# Patient Record
Sex: MALE | Race: WHITE | NOT HISPANIC OR LATINO | URBAN - METROPOLITAN AREA
[De-identification: names, ages, dates, MRNs, and addresses within clinical notes are randomized per-mention and may not be internally consistent; named-entity substitution may affect disease eponyms.]

---

## 2019-06-05 ENCOUNTER — INPATIENT (INPATIENT)
Facility: HOSPITAL | Age: 64
LOS: 5 days | Discharge: NURSING, REHAB OR LONG-TERM CARE | End: 2019-06-11
Attending: INTERNAL MEDICINE | Admitting: INTERNAL MEDICINE
Payer: COMMERCIAL

## 2019-06-05 VITALS
HEART RATE: 78 BPM | HEIGHT: 68 IN | OXYGEN SATURATION: 100 % | DIASTOLIC BLOOD PRESSURE: 108 MMHG | RESPIRATION RATE: 18 BRPM | TEMPERATURE: 98 F | WEIGHT: 225.09 LBS | SYSTOLIC BLOOD PRESSURE: 235 MMHG

## 2019-06-05 LAB
ALBUMIN SERPL ELPH-MCNC: 4.3 G/DL — SIGNIFICANT CHANGE UP (ref 3.3–5)
ALP SERPL-CCNC: 64 U/L — SIGNIFICANT CHANGE UP (ref 40–120)
ALT FLD-CCNC: 31 U/L — SIGNIFICANT CHANGE UP (ref 12–78)
ANION GAP SERPL CALC-SCNC: 12 MMOL/L — SIGNIFICANT CHANGE UP (ref 5–17)
APPEARANCE UR: CLEAR — SIGNIFICANT CHANGE UP
APTT BLD: 31.5 SEC — SIGNIFICANT CHANGE UP (ref 27.5–36.3)
AST SERPL-CCNC: 21 U/L — SIGNIFICANT CHANGE UP (ref 15–37)
BASOPHILS # BLD AUTO: 0.04 K/UL — SIGNIFICANT CHANGE UP (ref 0–0.2)
BASOPHILS NFR BLD AUTO: 0.8 % — SIGNIFICANT CHANGE UP (ref 0–2)
BILIRUB SERPL-MCNC: 2.3 MG/DL — HIGH (ref 0.2–1.2)
BILIRUB UR-MCNC: NEGATIVE — SIGNIFICANT CHANGE UP
BLD GP AB SCN SERPL QL: SIGNIFICANT CHANGE UP
BUN SERPL-MCNC: 11 MG/DL — SIGNIFICANT CHANGE UP (ref 7–23)
CALCIUM SERPL-MCNC: 9.6 MG/DL — SIGNIFICANT CHANGE UP (ref 8.5–10.1)
CHLORIDE SERPL-SCNC: 100 MMOL/L — SIGNIFICANT CHANGE UP (ref 96–108)
CK MB CFR SERPL CALC: 2.1 NG/ML — SIGNIFICANT CHANGE UP (ref 0.5–3.6)
CO2 SERPL-SCNC: 25 MMOL/L — SIGNIFICANT CHANGE UP (ref 22–31)
COLOR SPEC: YELLOW — SIGNIFICANT CHANGE UP
CREAT SERPL-MCNC: 1.1 MG/DL — SIGNIFICANT CHANGE UP (ref 0.5–1.3)
DIFF PNL FLD: ABNORMAL
EOSINOPHIL # BLD AUTO: 0.04 K/UL — SIGNIFICANT CHANGE UP (ref 0–0.5)
EOSINOPHIL NFR BLD AUTO: 0.8 % — SIGNIFICANT CHANGE UP (ref 0–6)
EPI CELLS # UR: SIGNIFICANT CHANGE UP
GLUCOSE BLDC GLUCOMTR-MCNC: 123 MG/DL — HIGH (ref 70–99)
GLUCOSE SERPL-MCNC: 135 MG/DL — HIGH (ref 70–99)
GLUCOSE UR QL: 50 MG/DL
HCT VFR BLD CALC: 46 % — SIGNIFICANT CHANGE UP (ref 39–50)
HGB BLD-MCNC: 15.9 G/DL — SIGNIFICANT CHANGE UP (ref 13–17)
IMM GRANULOCYTES NFR BLD AUTO: 0.2 % — SIGNIFICANT CHANGE UP (ref 0–1.5)
INR BLD: 1.22 RATIO — HIGH (ref 0.88–1.16)
KETONES UR-MCNC: ABNORMAL
LEUKOCYTE ESTERASE UR-ACNC: NEGATIVE — SIGNIFICANT CHANGE UP
LYMPHOCYTES # BLD AUTO: 1.2 K/UL — SIGNIFICANT CHANGE UP (ref 1–3.3)
LYMPHOCYTES # BLD AUTO: 23.9 % — SIGNIFICANT CHANGE UP (ref 13–44)
MAGNESIUM SERPL-MCNC: 2.1 MG/DL — SIGNIFICANT CHANGE UP (ref 1.6–2.6)
MCHC RBC-ENTMCNC: 29.7 PG — SIGNIFICANT CHANGE UP (ref 27–34)
MCHC RBC-ENTMCNC: 34.6 GM/DL — SIGNIFICANT CHANGE UP (ref 32–36)
MCV RBC AUTO: 85.8 FL — SIGNIFICANT CHANGE UP (ref 80–100)
MONOCYTES # BLD AUTO: 0.3 K/UL — SIGNIFICANT CHANGE UP (ref 0–0.9)
MONOCYTES NFR BLD AUTO: 6 % — SIGNIFICANT CHANGE UP (ref 2–14)
NEUTROPHILS # BLD AUTO: 3.43 K/UL — SIGNIFICANT CHANGE UP (ref 1.8–7.4)
NEUTROPHILS NFR BLD AUTO: 68.3 % — SIGNIFICANT CHANGE UP (ref 43–77)
NITRITE UR-MCNC: NEGATIVE — SIGNIFICANT CHANGE UP
NRBC # BLD: 0 /100 WBCS — SIGNIFICANT CHANGE UP (ref 0–0)
PH UR: 5 — SIGNIFICANT CHANGE UP (ref 5–8)
PLATELET # BLD AUTO: 184 K/UL — SIGNIFICANT CHANGE UP (ref 150–400)
POTASSIUM SERPL-MCNC: 3.6 MMOL/L — SIGNIFICANT CHANGE UP (ref 3.5–5.3)
POTASSIUM SERPL-SCNC: 3.6 MMOL/L — SIGNIFICANT CHANGE UP (ref 3.5–5.3)
PROT SERPL-MCNC: 8 GM/DL — SIGNIFICANT CHANGE UP (ref 6–8.3)
PROT UR-MCNC: 30 MG/DL
PROTHROM AB SERPL-ACNC: 13.8 SEC — HIGH (ref 10–12.9)
RBC # BLD: 5.36 M/UL — SIGNIFICANT CHANGE UP (ref 4.2–5.8)
RBC # FLD: 13.1 % — SIGNIFICANT CHANGE UP (ref 10.3–14.5)
RBC CASTS # UR COMP ASSIST: SIGNIFICANT CHANGE UP /HPF (ref 0–4)
SODIUM SERPL-SCNC: 137 MMOL/L — SIGNIFICANT CHANGE UP (ref 135–145)
SP GR SPEC: 1.01 — SIGNIFICANT CHANGE UP (ref 1.01–1.02)
TROPONIN I SERPL-MCNC: <.015 NG/ML — SIGNIFICANT CHANGE UP (ref 0.01–0.04)
TSH SERPL-MCNC: 3.22 UU/ML — SIGNIFICANT CHANGE UP (ref 0.36–3.74)
UROBILINOGEN FLD QL: NEGATIVE MG/DL — SIGNIFICANT CHANGE UP
WBC # BLD: 5.02 K/UL — SIGNIFICANT CHANGE UP (ref 3.8–10.5)
WBC # FLD AUTO: 5.02 K/UL — SIGNIFICANT CHANGE UP (ref 3.8–10.5)

## 2019-06-05 PROCEDURE — G0425: CPT

## 2019-06-05 PROCEDURE — 93010 ELECTROCARDIOGRAM REPORT: CPT

## 2019-06-05 PROCEDURE — 70450 CT HEAD/BRAIN W/O DYE: CPT | Mod: 26,59

## 2019-06-05 PROCEDURE — 99291 CRITICAL CARE FIRST HOUR: CPT

## 2019-06-05 PROCEDURE — 70496 CT ANGIOGRAPHY HEAD: CPT | Mod: 26

## 2019-06-05 RX ORDER — SODIUM CHLORIDE 9 MG/ML
1000 INJECTION, SOLUTION INTRAVENOUS
Refills: 0 | Status: DISCONTINUED | OUTPATIENT
Start: 2019-06-05 | End: 2019-06-11

## 2019-06-05 RX ORDER — DEXTROSE 50 % IN WATER 50 %
25 SYRINGE (ML) INTRAVENOUS ONCE
Refills: 0 | Status: DISCONTINUED | OUTPATIENT
Start: 2019-06-05 | End: 2019-06-11

## 2019-06-05 RX ORDER — ALTEPLASE 100 MG
9 KIT INTRAVENOUS ONCE
Refills: 0 | Status: COMPLETED | OUTPATIENT
Start: 2019-06-05 | End: 2019-06-05

## 2019-06-05 RX ORDER — CHLORHEXIDINE GLUCONATE 213 G/1000ML
1 SOLUTION TOPICAL
Refills: 0 | Status: DISCONTINUED | OUTPATIENT
Start: 2019-06-05 | End: 2019-06-06

## 2019-06-05 RX ORDER — GLUCAGON INJECTION, SOLUTION 0.5 MG/.1ML
1 INJECTION, SOLUTION SUBCUTANEOUS ONCE
Refills: 0 | Status: DISCONTINUED | OUTPATIENT
Start: 2019-06-05 | End: 2019-06-11

## 2019-06-05 RX ORDER — DEXTROSE 50 % IN WATER 50 %
12.5 SYRINGE (ML) INTRAVENOUS ONCE
Refills: 0 | Status: DISCONTINUED | OUTPATIENT
Start: 2019-06-05 | End: 2019-06-11

## 2019-06-05 RX ORDER — ALTEPLASE 100 MG
81 KIT INTRAVENOUS ONCE
Refills: 0 | Status: COMPLETED | OUTPATIENT
Start: 2019-06-05 | End: 2019-06-05

## 2019-06-05 RX ORDER — DEXTROSE 50 % IN WATER 50 %
15 SYRINGE (ML) INTRAVENOUS ONCE
Refills: 0 | Status: DISCONTINUED | OUTPATIENT
Start: 2019-06-05 | End: 2019-06-11

## 2019-06-05 RX ORDER — LABETALOL HCL 100 MG
10 TABLET ORAL ONCE
Refills: 0 | Status: COMPLETED | OUTPATIENT
Start: 2019-06-05 | End: 2019-06-05

## 2019-06-05 RX ORDER — INSULIN LISPRO 100/ML
VIAL (ML) SUBCUTANEOUS EVERY 6 HOURS
Refills: 0 | Status: DISCONTINUED | OUTPATIENT
Start: 2019-06-05 | End: 2019-06-06

## 2019-06-05 RX ADMIN — ALTEPLASE 540 MILLIGRAM(S): KIT at 13:15

## 2019-06-05 RX ADMIN — ALTEPLASE 81 MILLIGRAM(S): KIT at 14:23

## 2019-06-05 RX ADMIN — ALTEPLASE 9 MILLIGRAM(S): KIT at 13:15

## 2019-06-05 RX ADMIN — ALTEPLASE 81 MILLIGRAM(S): KIT at 13:22

## 2019-06-05 RX ADMIN — Medication 10 MILLIGRAM(S): at 13:00

## 2019-06-05 NOTE — ED PROVIDER NOTE - OBJECTIVE STATEMENT
Pertinent PMH/PSH/FHx/SHx and Review of Systems contained within:  63m hx of htn, dm pw facial droop. patient notes he felt "funny" last night around 6pm but had no specific deficits. this morning he woke up and was still feeling off, but again had no deficits. at 10am approx he developed right facial droop and dysarthria. he notes no numbness, weakness, dizziness, vision loss or fall. he has no cp, sob, nausea, vomiting, fever, chills, rash, bleeding. he and wife called pmd but he was not there so they came here  Fh and Sh not otherwise contributory  ROS otherwise negative

## 2019-06-05 NOTE — H&P ADULT - NSHPPHYSICALEXAM_GEN_ALL_CORE
Gen: Alert, NAD, with slurred speech  Head: NC, AT   Eyes: PERRL, EOMI, normal lids/conjunctiva.   ENT: normal hearing, patent oropharynx without erythema/exudate, uvula midline  Neck: supple, no tenderness, Trachea midline  Pulm: Bilateral BS, normal resp effort, no wheeze/stridor/retractions  CV: RRR, no M/R/G, 2+ radial and dp pulses bl, no edema  Abd: soft, NT/ND, +BS, no hepatosplenomegaly  Mskel: extremities x4 with normal ROM and no joint effusions. no ctl spine ttp.   Skin: no rash, no bruising   Neuro: AAOx3, sensations diffusely intact to light palpation. 5/5 lue, 5/5 rue, 5/5 lle, 4/5 rle. CN 2-12 intact

## 2019-06-05 NOTE — ED PROVIDER NOTE - CLINICAL SUMMARY MEDICAL DECISION MAKING FREE TEXT BOX
pt pw right facial droop. cva diagnosed. ct head wnl. tpa given with patient and wife in agreement. will admit to icu. As interpreted by ED physician, ECG is NSR with normal intervals/axis, no changes in QRS, no ST/T changes.

## 2019-06-05 NOTE — ED ADULT TRIAGE NOTE - CHIEF COMPLAINT QUOTE
c/o r facial droop and slurred speech since 0900 sent by pmd for eval denies headache or dizziness pt a&ox3

## 2019-06-05 NOTE — H&P ADULT - ATTENDING COMMENTS
Lottie: I have seen and examined the patient face to face, have reviewed and addended the HPI, PE and a/p as necessary.     64 yo M with PMHx HTN and DM presents to ED for right facial droop and slurred speech around 10am. In ED, code stroke was called and patient received thrombolytic treatment. Patient was also hypertensive (235/108) upon arrival and received labetolol. Patient was transferred to ICU for CVA s/p tPA treatment to monitor due to increased risk of bleeding.     GEN - NAD; well appearing; A+O x3; non-toxic appearing   CARD -s1s2, RRR, no M,G,R;   PULM - CTA b/l, symmetric breath sounds;   ABD:  +BS, ND, NT, soft, no guarding, no rebound, no masses;   BACK: no CVA tenderness, Normal  spine;   EXT: symmetric pulses, 2+ dp, capillary refill < 2 seconds, no clubbing, no cyanosis, no edema   NEURO: alert, R sided facial droop sparing the upper face, EOMI, PERRLA, reflexes nl, sensation nl, coordination nl, finger to nose nl, romberg negative, motor 5/5 RUE/LUE/RLE/LLE/EHL/Plantar flexion, no pronator drift, gait nl    Repeat CT Brain in 24 hours, pending MRI/MRA  Q 1 hour neuro checks, lipid profile, Hemoglobin A1c  Carotid Dopplers, Echocardiogram  SCD's  NPO until Swallow eval   PT/OT eval  Neurology consult   BP goal 160-180s    Critical Care Time Spent 35 minutes

## 2019-06-05 NOTE — H&P ADULT - ASSESSMENT
62 yo M with PMHx HTN and DM presents to ED for facial droop and slurred speech around 10am. In ED, code stroke was called and patient received thrombolytic treatment. Patient was also hypertensive (235/108) upon arrival and received labetolol. Patient was transferred to ICU for CVA s/p tPA treatment to monitor due to increased risk of bleeding.     Neuro  -Patient still presents with facial droop and slurred speech without any other neurological deficits.  -Head CT showed involutional gliotic ischemic changes and no intracranial bleed.  -CTA was done, awaiting radiologic interpretation.   -f/u carotid artery duplex  -Cont to monitor patient for onset of new neurological deficits.   -Consult Neurology    CV  -Patient has HTN, blood pressure currently stable  -Hold patient home antihypertensive medication.  -f/u Lipid panel    Pulm  -Patient does not display signs or symptoms of respiratory distress.    GI  -Keep patient NPO  -Cont maintenance fluids of D5W      -monitor BUN/Cr for STANLEY s/p CTA.   -monitor urine output.    ENDO  -Patient has his history of DM, blood glucose currently stable.  -f/u A1c levels  -ISS    GEN  -no blood draws for 4 hours post tPA  -f/u BMP and CBC  -cont to monitor for s/s of bleeding  -cont to monitor vital signs 64 yo M with PMHx HTN and DM presents to ED for right facial droop and slurred speech around 10am. In ED, code stroke was called and patient received thrombolytic treatment. Patient was also hypertensive (235/108) upon arrival and received labetolol. Patient was transferred to ICU for CVA s/p tPA treatment to monitor due to increased risk of bleeding.     Neuro  -Patient still presents with facial droop and slurred speech without any other neurological deficits.  -Head CT showed involutional gliotic ischemic changes and no intracranial bleed.  -CTA was done, awaiting radiologic interpretation.   -f/u carotid artery duplex  -Cont to monitor patient for onset of new neurological deficits.   -Consult Neurology    CV  -Patient has HTN, blood pressure currently stable  -Hold patient home antihypertensive medication.  -f/u Lipid panel    Pulm  -Patient does not display signs or symptoms of respiratory distress.    GI  -Keep patient NPO  -Cont maintenance fluids of D5W      -monitor BUN/Cr for STANLEY s/p CTA.   -monitor urine output.    ENDO  -Patient has his history of DM, blood glucose currently stable.  -f/u A1c levels  -ISS    GEN  -no blood draws for 4 hours post tPA  -f/u BMP and CBC  -cont to monitor for s/s of bleeding  -cont to monitor vital signs 64 yo M with PMHx HTN and DM presents to ED for right facial droop and slurred speech around 10am. In ED, code stroke was called and patient received thrombolytic treatment. Patient was also hypertensive (235/108) upon arrival and received labetolol. Patient was transferred to ICU for CVA s/p tPA treatment to monitor due to increased risk of bleeding.     Neuro  -Patient still presents with facial droop and slurred speech without any other neurological deficits.  -Head CT showed involutional gliotic ischemic changes and no intracranial bleed.  -CTA was done, awaiting radiologic interpretation.   -f/u carotid artery duplex  -Cont to monitor patient for onset of new neurological deficits (every 1 hour)   -Consult Neurology (Dr. Kyle)  -check carotid doppler      CV  -Patient has HTN, blood pressure currently stable  -Hold patient home antihypertensive medication and maintain between 160-180)  -f/u Lipid panel  -check echo    Pulm  -Patient does not display signs or symptoms of respiratory distress.    GI  -Keep patient NPO  -D5 gtt vs dextrose pushes PRN  FS q 6 hours while NPO      -monitor BUN/Cr for STANLEY s/p CTA.   -monitor urine output.    ENDO  -Patient has his history of DM, blood glucose currently stable.  -f/u A1c levels  -ISS    GEN  -no blood draws for 4 hours post tPA  -f/u BMP and CBC  -cont to monitor for s/s of bleeding  -cont to monitor vital signs  -SCD for DVT px

## 2019-06-05 NOTE — H&P ADULT - HISTORY OF PRESENT ILLNESS
HPI: Patient is a 62 yo M with a pmhx of HTN, DM who presents to the ED with facial droop. Patients symptoms began last night around 6pm without neurological deficits. Patient then developed facial droop and dysarthria this morning at 10am. Patient admitted to ICU for CVA s/p tPA treatment. Denies paresthesias weakness, dizziness, vision loss, LOC, chest pain, SOB, N/V/D, fever, and urinary symptoms.     In the ED patient was code stroke, CTA Brain neg for intracranial hemorrhage and involutional and ischemic gliotic changes. Patient received tPA and labetelol. BP of 235/108 was reduced to 156/75.     PAST MEDICAL & SURGICAL HISTORY:  DM (diabetes mellitus)  HTN (hypertension)  No significant past surgical history    Allergies:  Penicillin (Rash)    Home Medications:  amLODIPine 5 mg oral tablet: 1 tab(s) orally once a day (05 Jun 2019 13:46)  Bystolic 10 mg oral tablet: 1 tab(s) orally once a day (05 Jun 2019 13:46)  Tradjenta 5 mg oral tablet: 1 tab(s) orally once a day (05 Jun 2019 13:46)  valsartan 320 mg oral tablet: 1 tab(s) orally once a day (05 Jun 2019 13:46)    MEDICATIONS  (STANDING):  chlorhexidine 4% Liquid 1 Application(s) Topical <User Schedule>  dextrose 5%. 1000 milliLiter(s) (50 mL/Hr) IV Continuous <Continuous>  dextrose 50% Injectable 12.5 Gram(s) IV Push once  dextrose 50% Injectable 25 Gram(s) IV Push once  dextrose 50% Injectable 25 Gram(s) IV Push once  insulin lispro (HumaLOG) corrective regimen sliding scale   SubCutaneous every 6 hours    MEDICATIONS  (PRN):  dextrose 40% Gel 15 Gram(s) Oral once PRN Blood Glucose LESS THAN 70 milliGRAM(s)/deciliter  glucagon  Injectable 1 milliGRAM(s) IntraMuscular once PRN Glucose LESS THAN 70 milligrams/deciliter    Vital Signs Last 24 Hrs  T(C): 36.9 (05 Jun 2019 14:52), Max: 36.9 (05 Jun 2019 14:52)  T(F): 98.4 (05 Jun 2019 14:52), Max: 98.4 (05 Jun 2019 14:52)  HR: 69 (05 Jun 2019 14:52) (69 - 82)  BP: 156/75 (05 Jun 2019 14:52) (141/67 - 235/108)  BP(mean): --  RR: 14 (05 Jun 2019 14:52) (14 - 18)  SpO2: 98% (05 Jun 2019 14:52) (98% - 100%)                          15.9   5.02  )-----------( 184      ( 05 Jun 2019 13:34 )             46.0   06-05    137  |  100  |  11  ----------------------------<  135<H>  3.6   |  25  |  1.10    Ca    9.6      05 Jun 2019 13:34  Mg     2.1     06-05    TPro  8.0  /  Alb  4.3  /  TBili  2.3<H>  /  DBili  x   /  AST  21  /  ALT  31  /  AlkPhos  64  06-05    CT BRAIN STROKE PROTOCOL                            PROCEDURE DATE:  06/05/2019          INTERPRETATION:  CLINICAL INFORMATION: CVA, right facial droop and   slurred speech/dysarthria.    COMPARISON: None.    PROCEDURE:    Axial sections of the brain were obtained from base to vertex, without   the intravenous administration of contrast material. Sagittal and coronal   reformats were then generated from the axial images.    FINDINGS:    There is no intracranial hemorrhage, mass or shift of the midline   structures. There are involutional changes. Small vessel white matter   ischemic changes are noted. There are basal ganglia calcifications.    The brain stem is unremarkable.  No cerebellopontine angle lesion is   appreciated.     The fourth, third and lateral ventricles are normal position.  No   subdural or epidural collections are present.     No fracture or destructive lesion is identified.     The mastoids are clear. There are polyps or retention cysts within the   left maxillary sinus.    IMPRESSION:    Involutional and ischemic gliotic changes.  No acute intracranial hemorrhage. HPI: Patient is a 64 yo M with a pmhx of HTN, DM who presents to the ED with right facial droop. Patients symptoms began last night around 6pm without neurological deficits. Patient then developed facial droop and dysarthria this morning at 10am. Patient admitted to ICU for CVA s/p tPA treatment. Denies paresthesias weakness, dizziness, vision loss, LOC, chest pain, SOB, N/V/D, fever, and urinary symptoms.     In the ED patient was code stroke, CTA Brain neg for intracranial hemorrhage and involutional and ischemic gliotic changes. Patient received tPA and labetelol. BP of 235/108 was reduced to 156/75.     PAST MEDICAL & SURGICAL HISTORY:  DM (diabetes mellitus)  HTN (hypertension)  No significant past surgical history    Allergies:  Penicillin (Rash)    Home Medications:  amLODIPine 5 mg oral tablet: 1 tab(s) orally once a day (05 Jun 2019 13:46)  Bystolic 10 mg oral tablet: 1 tab(s) orally once a day (05 Jun 2019 13:46)  Tradjenta 5 mg oral tablet: 1 tab(s) orally once a day (05 Jun 2019 13:46)  valsartan 320 mg oral tablet: 1 tab(s) orally once a day (05 Jun 2019 13:46)    MEDICATIONS  (STANDING):  chlorhexidine 4% Liquid 1 Application(s) Topical <User Schedule>  dextrose 5%. 1000 milliLiter(s) (50 mL/Hr) IV Continuous <Continuous>  dextrose 50% Injectable 12.5 Gram(s) IV Push once  dextrose 50% Injectable 25 Gram(s) IV Push once  dextrose 50% Injectable 25 Gram(s) IV Push once  insulin lispro (HumaLOG) corrective regimen sliding scale   SubCutaneous every 6 hours    MEDICATIONS  (PRN):  dextrose 40% Gel 15 Gram(s) Oral once PRN Blood Glucose LESS THAN 70 milliGRAM(s)/deciliter  glucagon  Injectable 1 milliGRAM(s) IntraMuscular once PRN Glucose LESS THAN 70 milligrams/deciliter    Vital Signs Last 24 Hrs  T(C): 36.9 (05 Jun 2019 14:52), Max: 36.9 (05 Jun 2019 14:52)  T(F): 98.4 (05 Jun 2019 14:52), Max: 98.4 (05 Jun 2019 14:52)  HR: 69 (05 Jun 2019 14:52) (69 - 82)  BP: 156/75 (05 Jun 2019 14:52) (141/67 - 235/108)  BP(mean): --  RR: 14 (05 Jun 2019 14:52) (14 - 18)  SpO2: 98% (05 Jun 2019 14:52) (98% - 100%)                          15.9   5.02  )-----------( 184      ( 05 Jun 2019 13:34 )             46.0   06-05    137  |  100  |  11  ----------------------------<  135<H>  3.6   |  25  |  1.10    Ca    9.6      05 Jun 2019 13:34  Mg     2.1     06-05    TPro  8.0  /  Alb  4.3  /  TBili  2.3<H>  /  DBili  x   /  AST  21  /  ALT  31  /  AlkPhos  64  06-05    CT BRAIN STROKE PROTOCOL                            PROCEDURE DATE:  06/05/2019          INTERPRETATION:  CLINICAL INFORMATION: CVA, right facial droop and   slurred speech/dysarthria.    COMPARISON: None.    PROCEDURE:    Axial sections of the brain were obtained from base to vertex, without   the intravenous administration of contrast material. Sagittal and coronal   reformats were then generated from the axial images.    FINDINGS:    There is no intracranial hemorrhage, mass or shift of the midline   structures. There are involutional changes. Small vessel white matter   ischemic changes are noted. There are basal ganglia calcifications.    The brain stem is unremarkable.  No cerebellopontine angle lesion is   appreciated.     The fourth, third and lateral ventricles are normal position.  No   subdural or epidural collections are present.     No fracture or destructive lesion is identified.     The mastoids are clear. There are polyps or retention cysts within the   left maxillary sinus.    IMPRESSION:    Involutional and ischemic gliotic changes.  No acute intracranial hemorrhage.

## 2019-06-05 NOTE — STROKE CODE NOTE - ASSESSMENT/PLAN
impression: Acute ischemic stroke brainstem versus subcortical  Plan:   - Risks, benefits and adverse reactions associated with IV tPA including hemorrhagic stroke were discussed with patient and available family member in detail. After ruling out contraindications and obtaining verbal consent, patient would be treated with IV tPA  - Cont with IV tPA precautions including BP goal<185/110 mmHg before the bolus  - No antiplatelets or anticoagulants at this time, Aspirin to be considered 24 hours after the IV tPA and repeat brain imaging  - DVT prophylaxis with s/c heparin to be considered in 24 hours from IV tPA after brain imaging  - Consider Atorvastatin 80 mg after establishing enteral access or passing swallow eval  - Allow permissive HTN up to 180/105 mmHg   - Cont with IV hydration    Above mentioned plan was discussed with patient, available family member and  MD in detail. All the questions were answered and concerns were addressed.

## 2019-06-05 NOTE — ED PROVIDER NOTE - PHYSICAL EXAMINATION
Gen: Alert, NAD  Head: NC, AT   Eyes: PERRL, EOMI but perhaps very subtle left gaze preference, normal lids/conjunctiva. full visual fields.   ENT: normal hearing, patent oropharynx without erythema/exudate, uvula midline  Neck: supple, no tenderness, Trachea midline  Pulm: Bilateral BS, normal resp effort, no wheeze/stridor/retractions  CV: RRR, no M/R/G, 2+ radial and dp pulses bl, no edema  Abd: soft, NT/ND, +BS, no hepatosplenomegaly  Mskel: extremities x4 with normal ROM and no joint effusions. no ctl spine ttp.   Skin: no rash, no bruising   Neuro: AAOx3, no sensory deficits. 5/5 lue, 5/5 rue, 5/5 lle, 4/5 rle. CN 2-12 intact

## 2019-06-05 NOTE — H&P ADULT - NSHPREVIEWOFSYSTEMS_GEN_ALL_CORE
REVIEW OF SYSTEMS:     CONSTITUTIONAL: No fever, weight loss, or fatigue  EYES: No eye pain, visual disturbances, or discharge  ENMT:  No difficulty hearing, tinnitus, vertigo; No sinus or throat pain  NECK: No pain or stiffness  RESPIRATORY: No cough, wheezing, chills or hemoptysis; No shortness of breath  CARDIOVASCULAR: No chest pain, palpitations, dizziness, or leg swelling  GASTROINTESTINAL: No abdominal or epigastric pain. No nausea, vomiting, or hematemesis; No diarrhea or constipation. No melena or hematochezia.  GENITOURINARY: No dysuria, frequency, hematuria, or incontinence  NEUROLOGICAL: No headaches, memory loss, loss of strength, numbness, or tremors  SKIN: No itching, burning, rashes, or lesions

## 2019-06-05 NOTE — H&P ADULT - NSHPLABSRESULTS_GEN_ALL_CORE
15.9   5.02  )-----------( 184      ( 05 Jun 2019 13:34 )             46.0     137  |  100  |  11  ----------------------------<  135<H>  3.6   |  25  |  1.10    Ca    9.6      05 Jun 2019 13:34  Mg     2.1     06-05    TPro  8.0  /  Alb  4.3  /  TBili  2.3<H>  /  DBili  x   /  AST  21  /  ALT  31  /  AlkPhos  64  06-05    < from: CT Angio Head w/ IV Cont (06.05.19 @ 15:33) >    IMPRESSION:       1. No occlusion identified.  2. Narrowing of both middle cerebral arteries in the M1 region, with mild   narrowing on the left, and moderate to severe narrowing on the right.  3. Irregularity of multiple distal cortical branches of the middle and   posterior cerebral artery suggesting the possibility of atherosclerotic   change versus vasculopathy.No occlusion seen.  4. The vertebrobasilar system is tortuous but patent. . No internal   carotid occlusion or dissection suggested.     CLINTON VELEZ M.D., ATTENDING RADIOLOGIST  This document has been electronically signed. Jun 5 2019  3:49PM    < end of copied text >

## 2019-06-05 NOTE — STROKE CODE NOTE - SUBJECTIVE
Mr. Adams is a 63-year-old man with a past medical history of hypertension, diabetes who presents with sudden onset of right facial droop and dysarthria since 10 AM this morning.

## 2019-06-05 NOTE — ED ADULT NURSE NOTE - NSIMPLEMENTINTERV_GEN_ALL_ED
Implemented All Fall with Harm Risk Interventions:  [place] to call system. Call bell, personal items and telephone within reach. Instruct patient to call for assistance. Room bathroom lighting operational. Non-slip footwear when patient is off stretcher. Physically safe environment: no spills, clutter or unnecessary equipment. Stretcher in lowest position, wheels locked, appropriate side rails in place. Provide visual cue, wrist band, yellow gown, etc. Monitor gait and stability. Monitor for mental status changes and reorient to person, place, and time. Review medications for side effects contributing to fall risk. Reinforce activity limits and safety measures with patient and family. Provide visual clues: red socks.

## 2019-06-05 NOTE — CONSULT NOTE ADULT - SUBJECTIVE AND OBJECTIVE BOX
Subjective Complaints:  Historian:       Consult requested by ER doctor:                  Attending: DR GÓMEZ    HPI:  HPI: Patient is a 64 yo M with a pmhx of HTN, DM who presents to the ED with right facial droop. Patients symptoms began last night around 6pm without neurological deficits. Patient then developed facial droop and dysarthria this morning at 10am. Patient admitted to ICU for CVA s/p tPA treatment. Denies paresthesias weakness, dizziness, vision loss, LOC, chest pain, SOB, N/V/D, fever, and urinary symptoms.     In the ED patient was code stroke, CTA Brain neg for intracranial hemorrhage and involutional and ischemic gliotic changes. Patient received tPA and labetelol. BP of 235/108 was reduced to 156/75.     PAST MEDICAL & SURGICAL HISTORY:  DM (diabetes mellitus)  HTN (hypertension)  No significant past surgical history    Allergies:  Penicillin (Rash)    Home Medications:  amLODIPine 5 mg oral tablet: 1 tab(s) orally once a day (2019 13:46)  Bystolic 10 mg oral tablet: 1 tab(s) orally once a day (2019 13:46)  Tradjenta 5 mg oral tablet: 1 tab(s) orally once a day (2019 13:46)  valsartan 320 mg oral tablet: 1 tab(s) orally once a day (2019 13:46)    MEDICATIONS  (STANDING):  chlorhexidine 4% Liquid 1 Application(s) Topical <User Schedule>  dextrose 5%. 1000 milliLiter(s) (50 mL/Hr) IV Continuous <Continuous>  dextrose 50% Injectable 12.5 Gram(s) IV Push once  dextrose 50% Injectable 25 Gram(s) IV Push once  dextrose 50% Injectable 25 Gram(s) IV Push once  insulin lispro (HumaLOG) corrective regimen sliding scale   SubCutaneous every 6 hours    MEDICATIONS  (PRN):  dextrose 40% Gel 15 Gram(s) Oral once PRN Blood Glucose LESS THAN 70 milliGRAM(s)/deciliter  glucagon  Injectable 1 milliGRAM(s) IntraMuscular once PRN Glucose LESS THAN 70 milligrams/deciliter    Vital Signs Last 24 Hrs  T(C): 36.9 (2019 14:52), Max: 36.9 (2019 14:52)  T(F): 98.4 (2019 14:52), Max: 98.4 (2019 14:52)  HR: 69 (2019 14:52) (69 - 82)  BP: 156/75 (2019 14:52) (141/67 - 235/108)  BP(mean): --  RR: 14 (2019 14:52) (14 - 18)  SpO2: 98% (2019 14:52) (98% - 100%)                          15.9   5.02  )-----------( 184      ( 2019 13:34 )             46.0       137  |  100  |  11  ----------------------------<  135<H>  3.6   |  25  |  1.10    Ca    9.6      2019 13:34  Mg     2.1         TPro  8.0  /  Alb  4.3  /  TBili  2.3<H>  /  DBili  x   /  AST  21  /  ALT  31  /  AlkPhos  64      CT BRAIN STROKE PROTOCOL                            PROCEDURE DATE:  2019          INTERPRETATION:  CLINICAL INFORMATION: CVA, right facial droop and   slurred speech/dysarthria.    COMPARISON: None.    PROCEDURE:    Axial sections of the brain were obtained from base to vertex, without   the intravenous administration of contrast material. Sagittal and coronal   reformats were then generated from the axial images.    FINDINGS:    There is no intracranial hemorrhage, mass or shift of the midline   structures. There are involutional changes. Small vessel white matter   ischemic changes are noted. There are basal ganglia calcifications.    The brain stem is unremarkable.  No cerebellopontine angle lesion is   appreciated.     The fourth, third and lateral ventricles are normal position.  No   subdural or epidural collections are present.     No fracture or destructive lesion is identified.     The mastoids are clear. There are polyps or retention cysts within the   left maxillary sinus.    IMPRESSION:    Involutional and ischemic gliotic changes.  No acute intracranial hemorrhage. (2019 14:42)    SHADE CUELLO    PAST MEDICAL & SURGICAL HISTORY:  DM (diabetes mellitus)  HTN (hypertension)  No significant past surgical history  63yMale    MEDICATIONS  (STANDING):  chlorhexidine 4% Liquid 1 Application(s) Topical <User Schedule>  dextrose 5%. 1000 milliLiter(s) (50 mL/Hr) IV Continuous <Continuous>  dextrose 50% Injectable 12.5 Gram(s) IV Push once  dextrose 50% Injectable 25 Gram(s) IV Push once  dextrose 50% Injectable 25 Gram(s) IV Push once  insulin lispro (HumaLOG) corrective regimen sliding scale   SubCutaneous every 6 hours    MEDICATIONS  (PRN):  dextrose 40% Gel 15 Gram(s) Oral once PRN Blood Glucose LESS THAN 70 milliGRAM(s)/deciliter  glucagon  Injectable 1 milliGRAM(s) IntraMuscular once PRN Glucose LESS THAN 70 milligrams/deciliter      Allergies    No Known Allergies    Intolerances      FAMILY HISTORY:      REVIEW OF SYSTEMS:  General:  No wt loss, fevers, chills, night sweats  Eyes:  Good vision, no reported pain  ENT:  No sore throat, pain, runny nose, dysphagia  CV:  No pain, palpitatioins, hypo/hypertension  Resp:  No dyspnea, cough, tachypnea, wheezing  GI:  No pain, nausea, vomiting, diarrhea, constipatiion  :  No pain, bleeding, incontinence, nocturia  Muscle:  No pain, weakness  Breast:  No pain, abscess, mass, discharge  Neuro:  No weakness, tingling, memory problems  Psych:  No fatigue, insomnia, mood problems, depression  Endocrine:  No polyuria, polydypsia, cold/heat intolerance  Heme:  No petechiae, ecchymosis, easy bruisability  Skin:  No rash, tattoos, scars, edema      Vital Signs Last 24 Hrs  T(C): 37.4 (2019 15:32), Max: 37.4 (2019 15:32)  T(F): 99.3 (2019 15:32), Max: 99.3 (2019 15:32)  HR: 71 (2019 19:00) (64 - 84)  BP: 173/82 (2019 19:00) (139/110 - 235/108)  BP(mean): 105 (2019 19:00) (90 - 118)  RR: 18 (2019 19:00) (13 - 22)  SpO2: 99% (2019 19:00) (96% - 100%)    GENERAL PHYSICAL EXAM:  General:  Appears stated age, well-groomed, well-nourished, no distress  HEENT:  NC/AT, patent nares w/ pink mucosa, OP clear w/o lesions, PERRL, EOMI, conjunctivae clear, no thyromegaly, nodules, adenopathy, no JVD  Chest:  Full & symmetric excursion, no increased effort, breath sounds clear  Cardiovascular:  Regular rhythm, S1, S2, no murmur/rub/S3/S4, no carotid/femoral/abdominal bruit, radial/pedal pulses 2+, no edema  Abdomen:  Soft, non-tender, non-distended, normoactive bowel sounds, no HSM  Extremities:  Gait & station:   Digits:   Nails:   Joints, Bones, Muscles:   ROM:   Stability:  Skin:  No rash/erythema/ecchymoses/petechiae/wounds/abscess/warm/dry  Musculoskeletal:  Full ROM in all joints w/o swelling/tenderness/effusion    NEUROLOGICAL EXAM:  HENT:  Normocephalic head; atraumatic head.  Neck supple.  ENT: normal looking.  Mental State:    Alert.  Fully oriented to person, place and date.  Coherent.  Speech IS SLURRED.  Cranial Nerves:  II-XII:   Pupils round and reactive to light and accommodation.  Extraocular movements full.  Visual fields full (no homonymous hemianopsia).  Visual acuity wnl. right  Facialweakness.  Tongue midline.  Motor Functions:  Moves all extremities.  No pronator drift of UE.   Sensory Functions:   Intact to touch and pinprick to face and extremities.    Reflexes:  Deep tendon reflexes normoactive to biceps, knees and ankles.  Babinski absent (present).  Cerebellar Testing:    Finger to nose intact.  Nystagmus absent.  Gait : not tested      LABS:                        15.9   5.02  )-----------( 184      ( 2019 13:34 )             46.0     06-    137  |  100  |  11  ----------------------------<  135<H>  3.6   |  25  |  1.10    Ca    9.6      2019 13:34  Mg     2.1     06-    TPro  8.0  /  Alb  4.3  /  TBili  2.3<H>  /  DBili  x   /  AST  21  /  ALT  31  /  AlkPhos  64  06-05    PT/INR - ( 2019 13:34 )   PT: 13.8 sec;   INR: 1.22 ratio         PTT - ( 2019 13:34 )  PTT:31.5 sec    Urinalysis Basic - ( 2019 14:13 )    Color: Yellow / Appearance: Clear / S.010 / pH: x  Gluc: x / Ketone: Moderate  / Bili: Negative / Urobili: Negative mg/dL   Blood: x / Protein: 30 mg/dL / Nitrite: Negative   Leuk Esterase: Negative / RBC: 0-2 /HPF / WBC x   Sq Epi: x / Non Sq Epi: Occasional / Bacteria: x        RADIOLOGY & ADDITIONAL STUDIES:    CT Brain Stroke Protocol: Urgent   Indication: cva  Transport: Stretcher-Crib  Exam Completed  Provider's Contact #: [phone] ( @ 12:45)  labetalol Injectable: [Known as NORMODYNE Injectable]  10 milliGRAM(s), IV Push, once, Stop After 1 Doses  Provider's Contact #: [phone] ( @ 12:45)  Complete Blood Count + Automated Diff: STAT ( @ 12:48)  Comprehensive Metabolic Panel: STAT ( @ 12:48)  Type + Screen: STAT ( @ 12:48)  ABO Rh Confirmatory Specimen: STAT  Addl Info: Conditional: ABO Rh Confirmatory Specimen ( @ 12:48)  CKMB Mass Assay: STAT ( @ 12:48)  Troponin I, Serum: STAT ( @ 12:48)  Magnesium, Serum: STAT ( @ 12:48)  Thyroid Stimulating Hormone, Serum: STAT ( @ 12:48)  Urinalysis: STAT ( @ 12:48)  Prothrombin Time and INR, Plasma:  Start Date:2019. STAT ( @ 12:48)  Activated Partial Thromboplastin Time:  Start Date:2019. STAT ( @ 12:48)  12 Lead ECG:   Provider's Contact #: [phone] ( @ 12:48)  Dysphagia Screening:     Time/Priority:  STAT ( @ 12:48)  POCT  Blood Glucose: 12:37 ( @ 12:49)  National Institutes of Health Stroke Scale Score:     Time/Priority:  Routine ( @ 13:05)  Vital Signs:     Frequency:  See Frequency Below    Every: 15 minute(s)   For: 2 hour(s)    Every: 30 minute(s)   For: 6 hour(s)    Every: 1 hour(s)   For: 16 hour(s) then every 2 hr ( @ 13:05)  Assess Neurological Status-Post Procedure:     Frequency:  See Frequency Below    Every: 15 minute(s)   For: 2 hour(s)    Every: 30 minute(s)   For: 6 hour(s)    Every: 1 hour(s)   For: 16 hour(s) then every 2 hr    Additional Instructions:  After start of intravenous tissue plasminogen activator (tPA) ( @ 13:05)  Monitor For:     Additional Instructions:  Hematoma formation when using automatic blood pressure monitoring and pneumatic compression stockings. ( @ 13:05)  Assess Bleeding ( @ 13:05)  Dysphagia Screening:     Time/Priority:  Routine ( @ 13:05)  Notify Provider For:     Additional Instructions:  Systolic blood pressure GREATER THAN 180 mmHg ( @ 13:05)  Notify Provider For:     Additional Instructions:  Diastolic blood pressure GREATER THAN 105 mmHg ( @ 13:05)  Notify Provider For:     Additional Instructions:  Neurological decline or if patient complains of headache and STOP tissue plasminogen activator (tPA) Infusion ( @ 13:05)  Activity - Bedrest:     Duration:  24 Hours    Additional Instructions:  Bedrest for first 24 hours after intravenous tissue plasminogen activator (tPA) ( @ 13:05)  Activity - Out of Bed with Assistance:     Additional Instructions:  Start 24 hours post tissue plasminogen activator (tPA) bolus ( @ 13:05)  NO Blood Draw: ;  Duration:  4 Hours    Additional Instructions:  After intravenous tissue plasminogen activator (tPA) infusion ( @ 13:05)  NO Lines: ;  Duration:  24 Hours    Additional Instructions:  No central venous or arterial lines during the first 24 hours after intravenous tissue plasminogen activator (tPA) infusion ( @ 13:05)  Cardiac Monitor INCLUDING Off Unit Tests:     Time/Priority:  Routine ( @ 13:05)  Pulse Oximetry:   Frequency: <Continuous> ( @ 13:05)  NO Urethral Catheter: ;  Duration:  3 Hours    Additional Instructions:  During intravenous tissue plasminogen activator (tPA) infusion and for at least 2 hours after the infusion. ( @ 13:05)  NO Nasogastric Tube: ;  Duration:  24 Hours    Additional Instructions:  After intravenous tissue plasminogen activator (tPA) infusion. ( @ 13:05)  Diet, NPO:      Special Instructions for Nursing:  For the first 12 hours post tissue plasminogen activator (tPA) bolus ( @ 13:05)  Provider to RN:       No antiplatelet or anticoagulant medications for 24 hours AFTER tissue plasminogen activator (tPA) ( @ 13:05)  alteplase    Bolus: [Known as ACTIVASE Bolus]  9 milliGRAM(s) in sterile water 9 milliLiter(s), IV Bolus, once, infuse over 1 Minute(s), Stop After 1 Doses  Special Instructions: Total MAX dose 90 milliGRAM(s)  Provider's Contact #: [phone]     (Calc Info: 0.09 milliGRAM(s)/Kg/DOSE x 102.1 Kg = 9 milliGRAM(s)/Dose     (Requested dose was 0.09 milliGRAM(s) per Kg) ( @ 13:05)  alteplase    IVPB: [Known as ACTIVASE IVPB]  81 milliGRAM(s) in sterile water 81 milliLiter(s), IV Intermittent, once, infuse over 60 Minute(s), Stop After 1 Doses  Special Instructions: Total MAX dose 90 milliGRAM(s)  Provider's Contact #: [phone]     (Calc Info: DOSE CAP limits dose to 0.18055 milliGRAM(s)/Kg/DOSE x 102.1 Kg = 81 milliGRAM(s)/Dose     (Requested dose was 0.81 milliGRAM(s) per Kg) ( @ 13:05)  (Floorstock):   Qty Removed: 1 each ( @ 13:06)  (Floorstock):   Qty Removed: 1 each ( @ 13:06)  Antibody Screen: 13:12 ( @ 13:34)  Admit to Inpatient Level of Care:     Service:  INTENSIVE CARE UNIT    Physician:  Benedicto Gómez    Additional Instructions:  Diagnosis: Cerebrovascular accident (CVA), unspecified mechanism  Isolation: None  Special Consideration: None ( @ 13:47)  Admit from ED ( @ 13:57)  Urine Microscopic-Add On (NC): 14:00 ( @ 14:24)  CT Angio Head w/ IV Cont: Urgent   Indication: cva  Transport: Stretcher-Crib  Exam Completed  Provider's Contact #: [phone] ( 14:32)  Provider to RN:       Sedation awakening trial as per ABCDEF Guidelines ( 14:38)  Assess For:     Additional Instructions:  CAM ICU Assessment.  As per ICU policy or at a minimum every shift ( 14:38)  Height/Length:     Frequency:  on admission ( 14:38)  Weight:     Frequency:  daily (:38)  Vital Signs:     Frequency:  every 1 hour    Additional Instructions:  Assess BP, HR, RR, Temp. and SpO2 ( 14:38)  Assess Pain:     Frequency:  every 4 hours    Additional Instructions:  As per ABCDEF guideline (:38)  Assess Neurological Status:     Type of Neuro Check:  General    Frequency:  every 4 hours (:)  Intake and Output - Strict:     Frequency:  every 1 hour (:38)  chlorhexidine 4% Liquid: [Known as HIBICLENS]  1 Application(s), Topical, <User Schedule> ( every 1 day: 07:00 ), Whole Body  Special Instructions: Shower or bathe from the neck down as per guidelines. Leave the chlorhexidine on the skin for at least 2 minutes before rinsing off with water. DO NOT take a bath. AVOID internal genital area  Administration Instructions: external use only  Dispose unused medication in BLACK bin.  Provider's Contact #: [phone] ( 14:38)  Notify Provider For:     Additional Instructions:  Decline or change in neurological status (:38)  Activity - Bedrest:     Additional Instructions:  Reason for Bedrest:__________ ( 14:38)  Blood Glucose Point Of Care Testing:     Frequency:  every 6 hours    Additional Instructions:  If NPO (:39)  Blood Glucose Point Of Care Testing:     Frequency:  every 15 minutes    Additional Instructions:  After carbohydrate administration for hypoglycemia, repeat every 15 minute(s) until blood glucose is GREATER THAN or EQUAL  milliGRAM(s)/deciLiter twice consecutively ( 14:39)  Notify Provider For:     Additional Instructions:  Blood glucose LESS THAN 70 milliGRAM(s)/deciLiter or GREATER THAN 400 milliGRAM(s)/deciLiter (06-05 @ 14:39)  Education:     Diabetes    Other: Diet, Exercise    Additional Instructions: Diet, Exercise, Diabetes ( @ 14:39)  insulin lispro (HumaLOG) corrective regimen sliding scale:       1 Unit(s) if Glucose 151 - 200      2 Unit(s) if Glucose 201 - 250      3 Unit(s) if Glucose 251 - 300      4 Unit(s) if Glucose 301 - 350      5 Unit(s) if Glucose 351 - 400      6 Unit(s) if Glucose Greater Than 400 + Contact MD  SubCutaneous, every 6 hours  Special Instructions: Give correctional scale insulin REGARDLESS of PO status NOTIFY Provider for blood glucose LESS THAN 70 milliGRAM(s)/deciLiter or above 400 milliGRAM(s)/deciLiter.  Administration Instructions: *Per Sliding Scale*  Dispose unused medication in BLACK bin.  This is a Look-alike/Sound-alike Medication  Provider's Contact #: [phone] ( @ 14:39)  dextrose 5%.: Solution, 1000 milliLiter(s) infuse at 50 mL/Hr  Special Instructions: Conditional Order: HYPOGLYCEMIA PROTOCOL  Provider's Contact #: [phone] ( @ 14:39)  Administer Carbohydrates:     Additional Instructions:  STAT RESPONSIVE patient and Blood Glucose is LESS THAN 70 milliGRAM(s)/deciLiter: Administer 15 grams of fast acting carbohydrate [e.g., Give 4 ounces of APPLE Juice OR 6 ounces of NON-DIET soda OR 1 tube (15 grams) oral glucose gel (available in Automated Dispensing Machine)] and recheck capillary blood glucose in 15 minutes.  Repeat treatment and recheck glucose every 15 minutes until Blood Glucose is GREATER THAN or EQUAL  milliGRAM(s)/deciLiter and NOTIFY Provider.  HYPOGLYCEMIA PROTOCOL ( @ 14:39)  dextrose 40% Gel: [Known as GLUTOSE 15]  15 Gram(s), Oral, once, PRN for Blood Glucose LESS THAN 70 milliGRAM(s)/deciliter, Stop After 1 Doses  Special Instructions: Conditional Order: HYPOGLYCEMIA PROTOCOL  Administration Instructions: Contents of 37.5 Gram(s) tube delivers 15 Gram(s) dextrose  Provider's Contact #: [phone] ( @ 14:39)  dextrose 50% Injectable:   12.5 Gram(s), IV Push, once, Stop After 1 Doses  Special Instructions: Conditional Order: HYPOGLYCEMIA PROTOCOL  Provider's Contact #: [phone] ( @ 14:39)  dextrose 50% Injectable:   25 Gram(s), IV Push, once, Stop After 1 Doses  Special Instructions: Conditional Order: HYPOGLYCEMIA PROTOCOL  Provider's Contact #: [phone] ( @ 14:39)  dextrose 50% Injectable:   25 Gram(s), IV Push, once, Stop After 1 Doses  Special Instructions: Conditional Order: HYPOGLYCEMIA PROTOCOL  Provider's Contact #: [phone] ( @ 14:39)  glucagon  Injectable:   1 milliGRAM(s), IntraMuscular, once, PRN for Glucose LESS THAN 70 milligrams/deciliter, Stop After 1 Doses  Special Instructions: Conditional Order: HYPOGLYCEMIA PROTOCOL  Provider's Contact #: [phone] ( @ 14:39)  Provider to RN:       UNRESPONSIVE patient and Blood Glucose LESS THAN 70 milliGRAM(s)/deciLiter call Rapid Response.  HYPOGLYCEMIA PROTOCOL ( @ 14:39)  Lipid Profile: AM Sched. Collection: 2019 05:00 ( @ 14:41)  Hemoglobin A1C, Whole Blood: AM Sched. Collection: 2019 05:00 ( @ 14:41)  US Duplex Carotid Arteries Complete, Bilateral: Routine   Indication: stenosis  Transport: Portable  Provider's Contact #: [phone] ( @ 14:41)  TTE Echo Doppler w/o Cont:   Transport: Portable  Monitor: w/ Monitor  Provider's Contact #: [phone] ( @ 14:41)  VTE Prophylaxis - No Pharmacological Prophylaxis Due To::     Time/Priority:  Routine    Reason For No Pharmacologic VTE Prophylaxis  Bleeding Risk    Additional Instructions:  s/p tpa ( @ 14:41)  Admit to Inpatient Level of Care:     Service:  icu    Physician:  Dr. Gómez ( @ 14:42)  Basic Metabolic Panel: AM Sched. Collection: 2019 05:00 ( @ 14:43)  Complete Blood Count: AM Sched. Collection: 2019 05:00 ( @ 14:43)  Magnesium, Serum: AM Sched. Collection: 2019 05:00 ( @ 14:43)  Phosphorus Level, Serum: AM Sched. Collection: 2019 05:00 ( @ 14:43)  Hepatitis C Antibody Test: AM Sched. Collection: 2019 04:00 ( @ 16:24)  POCT  Blood Glucose: 17:31 ( @ 17:33)      Assessment & Opinion: 63 y o man seen for evaluation because of slurred speech ,is also found to have a right facial weakness ,NIHSS IS 3 ,PATIENT RECEIVED TPA   DX RIGHT PONTINE INFARCT ( RIGHT VERTABRAL  TERRITORY )     Recommendations:  Brain MRI.  Carotid doppler.  Echocardiogram.   DVT prophylaxis as ordered I AM   Medications:

## 2019-06-05 NOTE — ED ADULT NURSE NOTE - OBJECTIVE STATEMENT
pt a&O x3, Pt c/o r facial droop and slurred speech since 0900. Pt went to pcp and then sent by pmd for eval by ER. pt code stroke. partial gaze right, slurred speech, numbness to right side face. No other deficits, see full NIHH scale. Pt denies headache or dizziness, pain.

## 2019-06-06 ENCOUNTER — TRANSCRIPTION ENCOUNTER (OUTPATIENT)
Age: 64
End: 2019-06-06

## 2019-06-06 LAB
ANION GAP SERPL CALC-SCNC: 13 MMOL/L — SIGNIFICANT CHANGE UP (ref 5–17)
BUN SERPL-MCNC: 8 MG/DL — SIGNIFICANT CHANGE UP (ref 7–23)
CALCIUM SERPL-MCNC: 8.9 MG/DL — SIGNIFICANT CHANGE UP (ref 8.5–10.1)
CHLORIDE SERPL-SCNC: 101 MMOL/L — SIGNIFICANT CHANGE UP (ref 96–108)
CHOLEST SERPL-MCNC: 161 MG/DL — SIGNIFICANT CHANGE UP (ref 10–199)
CO2 SERPL-SCNC: 25 MMOL/L — SIGNIFICANT CHANGE UP (ref 22–31)
CREAT SERPL-MCNC: 0.84 MG/DL — SIGNIFICANT CHANGE UP (ref 0.5–1.3)
GLUCOSE BLDC GLUCOMTR-MCNC: 112 MG/DL — HIGH (ref 70–99)
GLUCOSE BLDC GLUCOMTR-MCNC: 132 MG/DL — HIGH (ref 70–99)
GLUCOSE SERPL-MCNC: 92 MG/DL — SIGNIFICANT CHANGE UP (ref 70–99)
HBA1C BLD-MCNC: 5.2 % — SIGNIFICANT CHANGE UP (ref 4–5.6)
HCT VFR BLD CALC: 41.1 % — SIGNIFICANT CHANGE UP (ref 39–50)
HCV AB S/CO SERPL IA: 0.06 S/CO — SIGNIFICANT CHANGE UP (ref 0–0.99)
HCV AB SERPL-IMP: SIGNIFICANT CHANGE UP
HDLC SERPL-MCNC: 54 MG/DL — SIGNIFICANT CHANGE UP
HGB BLD-MCNC: 14.2 G/DL — SIGNIFICANT CHANGE UP (ref 13–17)
LIPID PNL WITH DIRECT LDL SERPL: 95 MG/DL — SIGNIFICANT CHANGE UP
MAGNESIUM SERPL-MCNC: 2.4 MG/DL — SIGNIFICANT CHANGE UP (ref 1.6–2.6)
MCHC RBC-ENTMCNC: 29.8 PG — SIGNIFICANT CHANGE UP (ref 27–34)
MCHC RBC-ENTMCNC: 34.5 GM/DL — SIGNIFICANT CHANGE UP (ref 32–36)
MCV RBC AUTO: 86.3 FL — SIGNIFICANT CHANGE UP (ref 80–100)
NRBC # BLD: 0 /100 WBCS — SIGNIFICANT CHANGE UP (ref 0–0)
PHOSPHATE SERPL-MCNC: 3.3 MG/DL — SIGNIFICANT CHANGE UP (ref 2.5–4.5)
PLATELET # BLD AUTO: 165 K/UL — SIGNIFICANT CHANGE UP (ref 150–400)
POTASSIUM SERPL-MCNC: 3.1 MMOL/L — LOW (ref 3.5–5.3)
POTASSIUM SERPL-SCNC: 3.1 MMOL/L — LOW (ref 3.5–5.3)
RBC # BLD: 4.76 M/UL — SIGNIFICANT CHANGE UP (ref 4.2–5.8)
RBC # FLD: 13.1 % — SIGNIFICANT CHANGE UP (ref 10.3–14.5)
SODIUM SERPL-SCNC: 139 MMOL/L — SIGNIFICANT CHANGE UP (ref 135–145)
TOTAL CHOLESTEROL/HDL RATIO MEASUREMENT: 3 RATIO — LOW (ref 3.4–9.6)
TRIGL SERPL-MCNC: 59 MG/DL — SIGNIFICANT CHANGE UP (ref 10–149)
WBC # BLD: 4.8 K/UL — SIGNIFICANT CHANGE UP (ref 3.8–10.5)
WBC # FLD AUTO: 4.8 K/UL — SIGNIFICANT CHANGE UP (ref 3.8–10.5)

## 2019-06-06 PROCEDURE — 70544 MR ANGIOGRAPHY HEAD W/O DYE: CPT | Mod: 26,59

## 2019-06-06 PROCEDURE — 93306 TTE W/DOPPLER COMPLETE: CPT | Mod: 26

## 2019-06-06 PROCEDURE — 70551 MRI BRAIN STEM W/O DYE: CPT | Mod: 26

## 2019-06-06 PROCEDURE — 99233 SBSQ HOSP IP/OBS HIGH 50: CPT

## 2019-06-06 RX ORDER — LOSARTAN POTASSIUM 100 MG/1
100 TABLET, FILM COATED ORAL DAILY
Refills: 0 | Status: DISCONTINUED | OUTPATIENT
Start: 2019-06-07 | End: 2019-06-11

## 2019-06-06 RX ORDER — ASPIRIN/CALCIUM CARB/MAGNESIUM 324 MG
325 TABLET ORAL DAILY
Refills: 0 | Status: DISCONTINUED | OUTPATIENT
Start: 2019-06-06 | End: 2019-06-11

## 2019-06-06 RX ORDER — ATORVASTATIN CALCIUM 80 MG/1
40 TABLET, FILM COATED ORAL AT BEDTIME
Refills: 0 | Status: DISCONTINUED | OUTPATIENT
Start: 2019-06-06 | End: 2019-06-11

## 2019-06-06 RX ORDER — INSULIN LISPRO 100/ML
VIAL (ML) SUBCUTANEOUS
Refills: 0 | Status: DISCONTINUED | OUTPATIENT
Start: 2019-06-06 | End: 2019-06-11

## 2019-06-06 RX ORDER — PANTOPRAZOLE SODIUM 20 MG/1
40 TABLET, DELAYED RELEASE ORAL
Refills: 0 | Status: DISCONTINUED | OUTPATIENT
Start: 2019-06-06 | End: 2019-06-11

## 2019-06-06 RX ORDER — INSULIN LISPRO 100/ML
VIAL (ML) SUBCUTANEOUS AT BEDTIME
Refills: 0 | Status: DISCONTINUED | OUTPATIENT
Start: 2019-06-06 | End: 2019-06-11

## 2019-06-06 RX ORDER — AMLODIPINE BESYLATE 2.5 MG/1
5 TABLET ORAL DAILY
Refills: 0 | Status: DISCONTINUED | OUTPATIENT
Start: 2019-06-06 | End: 2019-06-11

## 2019-06-06 RX ORDER — POTASSIUM CHLORIDE 20 MEQ
10 PACKET (EA) ORAL
Refills: 0 | Status: COMPLETED | OUTPATIENT
Start: 2019-06-06 | End: 2019-06-06

## 2019-06-06 RX ADMIN — PANTOPRAZOLE SODIUM 40 MILLIGRAM(S): 20 TABLET, DELAYED RELEASE ORAL at 16:52

## 2019-06-06 RX ADMIN — Medication 100 MILLIEQUIVALENT(S): at 09:52

## 2019-06-06 RX ADMIN — Medication 0: at 21:08

## 2019-06-06 RX ADMIN — Medication 100 MILLIEQUIVALENT(S): at 07:40

## 2019-06-06 RX ADMIN — AMLODIPINE BESYLATE 5 MILLIGRAM(S): 2.5 TABLET ORAL at 16:52

## 2019-06-06 RX ADMIN — Medication 325 MILLIGRAM(S): at 16:52

## 2019-06-06 RX ADMIN — ATORVASTATIN CALCIUM 40 MILLIGRAM(S): 80 TABLET, FILM COATED ORAL at 21:06

## 2019-06-06 RX ADMIN — CHLORHEXIDINE GLUCONATE 1 APPLICATION(S): 213 SOLUTION TOPICAL at 09:53

## 2019-06-06 RX ADMIN — Medication 100 MILLIEQUIVALENT(S): at 08:46

## 2019-06-06 RX ADMIN — Medication 0: at 01:09

## 2019-06-06 NOTE — DIETITIAN INITIAL EVALUATION ADULT. - PERTINENT MEDS FT
chlorhexidine 4% Liquid  dextrose 40% Gel PRN  dextrose 5%.  dextrose 50% Injectable  dextrose 50% Injectable  dextrose 50% Injectable  glucagon  Injectable PRN  insulin lispro (HumaLOG) corrective regimen sliding scale

## 2019-06-06 NOTE — CHART NOTE - NSCHARTNOTEFT_GEN_A_CORE
HPI  HPI: Patient is a 64 yo M with a pmhx of HTN, DM who presents to the ED with right facial droop. Patients symptoms began last night around 6pm without neurological deficits. Patient then developed facial droop and dysarthria this morning at 10am.  Denies paresthesias weakness, dizziness, vision loss, LOC, chest pain, SOB, N/V/D, fever, and urinary symptoms. In the ED patient was code stroke, CTA Brain neg for intracranial hemorrhage and involutional and ischemic gliotic changes. Patient received tPA and labetelol. BP of 235/108 was reduced to 156/75. Patient admitted to ICU for post tpa monitoring.  MRI < from: MR Head No Cont (06.06.19 @ 14:31) >    IMPRESSION:      1)  a periventricular and basal ganglia acute infarct is noted on the   left in the MCA territory. Otherwise there are very mild scattered   chronic ischemic changes with volume loss. No large vessel occlusion   suggested..  2)  scattered thickening noted in the sinuses without fluid levels..       < end of copied text >     < from: MR Angio Head No Cont (06.06.19 @ 14:50) >    IMPRESSION:     Segmental narrowing noted in both the middle cerebral arteries in the M1   segments, along with atherosclerotic changes throughout the cerebral   vasculature. Narrowing is most notable in the mid left M1 and distal   right M1 region.      < end of copied text >    Patient continues to have right facial droop and slight dysarthria.  Seen by Speech PT and OT ordered. Started on Aspirin today and Lipitor to start tonight. Patient seen and examined by ICU attending. Stable for transfer to medicine service.     Report given to Dr. De Dios, hospitalist service    ELSIE Stovall  critical care HPI  HPI: Patient is a 62 yo M with a pmhx of HTN, DM who presents to the ED with right facial droop. Patients symptoms began last night around 6pm without neurological deficits. Patient then developed facial droop and dysarthria this morning at 10am.  Denies paresthesias weakness, dizziness, vision loss, LOC, chest pain, SOB, N/V/D, fever, and urinary symptoms. In the ED patient was code stroke, CTA Brain neg for intracranial hemorrhage and involutional and ischemic gliotic changes. Patient received tPA and labetelol. BP of 235/108 was reduced to 156/75. Patient admitted to ICU for post tpa monitoring.  MRI < from: MR Head No Cont (06.06.19 @ 14:31) >    IMPRESSION:      1)  a periventricular and basal ganglia acute infarct is noted on the   left in the MCA territory. Otherwise there are very mild scattered   chronic ischemic changes with volume loss. No large vessel occlusion   suggested..  2)  scattered thickening noted in the sinuses without fluid levels..       < end of copied text >     < from: MR Angio Head No Cont (06.06.19 @ 14:50) >    IMPRESSION:     Segmental narrowing noted in both the middle cerebral arteries in the M1   segments, along with atherosclerotic changes throughout the cerebral   vasculature. Narrowing is most notable in the mid left M1 and distal   right M1 region.      < end of copied text >    Patient continues to have right facial droop and slight dysarthria.  Seen by Speech PT and OT ordered. Started on Aspirin today and Lipitor to start tonight. Started on Amlodipine today.  Patient seen and examined by ICU attending. Stable for transfer to medicine service.     Report given to Dr. Mcfarlane , hospitalist service    ELSIE Stovall  critical care

## 2019-06-06 NOTE — PHYSICAL THERAPY INITIAL EVALUATION ADULT - PERTINENT HX OF CURRENT PROBLEM, REHAB EVAL
Patient is a 63 year male admitted on 6/5/2019 with a diagnosis of CVA.  Patient presents with dysarthria and R facial droop.  PMH includes HTN, DM.

## 2019-06-06 NOTE — DISCHARGE NOTE NURSING/CASE MANAGEMENT/SOCIAL WORK - NSDCDPATPORTLINK_GEN_ALL_CORE
You can access the [facility] Portal, offered by [facility], by registering with the following website: [email or web address]

## 2019-06-06 NOTE — SWALLOW BEDSIDE ASSESSMENT ADULT - SWALLOW EVAL: PATIENT/FAMILY GOALS STATEMENT
pt reported having "water and justine cracker earlier in the day" without difficulty. pt became aware of right sided weakness in arm and hand when using spoon, "it wasn't like this before". pt reported having "water and justine cracker earlier in the day" without difficulty. pt initially denied weakness but became aware of right sided weakness in arm and hand when using spoon, "it wasn't like this before" and he confirmed slurred speech when asked.

## 2019-06-06 NOTE — DIETITIAN INITIAL EVALUATION ADULT. - PERTINENT LABORATORY DATA
06-06 Na 139   Glu 92   K+ 3.1   Cr 8   BUN 13   Phos n/a   Alb 4.3(6/5)   PAB n/a   Hgb 14.2   Hct 41.1   HgA1C 5.2 %  Glucose, Serum: 92 mg/dL, QBGE=635, 108, 90

## 2019-06-06 NOTE — PROGRESS NOTE ADULT - SUBJECTIVE AND OBJECTIVE BOX
Subjective Complaints:  Historian:  Patient and record       REVIEW OF SYSTEMS:  Eyes:  Good vision, no reported pain  ENT:  No sore throat, pain, runny nose, dysphagia  CV:  No pain, palpitatioins, hypo/hypertension  Resp:  No dyspnea, cough, tachypnea, wheezing  GI:  No pain, nausea, vomiting, diarrhea, constipatiion  Muscle:  No pain, weakness  Neuro:  No weakness, tingling, memory problems  Psych:  No fatigue, insomnia, mood problems, depression  Endocrine:  No polyuria, polydypsia, cold/heat intolerance    Vital Signs Last 24 Hrs  T(C): 36.2 (2019 08:00), Max: 37.4 (2019 15:32)  T(F): 97.1 (2019 08:00), Max: 99.3 (2019 15:32)  HR: 67 (2019 11:30) (56 - 84)  BP: 168/87 (2019 11:30) (135/74 - 235/108)  BP(mean): 111 (2019 11:30) (89 - 118)  RR: 16 (2019 11:30) (4 - 24)  SpO2: 97% (2019 11:30) (90% - 100%)    GENERAL PHYSICAL EXAM:  General:  Appears stated age, well-groomed, well-nourished, no distress  HEENT:  NC/AT, patent nares w/ pink mucosa, OP clear w/o lesions, PERRL, EOMI, conjunctivae clear, no thyromegaly, nodules, adenopathy, no JVD  Chest:  Full & symmetric excursion, no increased effort, breath sounds clear  Cardiovascular:  Regular rhythm, S1, S2, no murmur/rub/S3/S4, no carotid/femoral/abdominal bruit, radial/pedal pulses 2+, no edema  Abdomen:  Soft, non-tender, non-distended, normoactive bowel sounds, no HSM  Extremities:  Gait & station:   Digits:   Nails:   Joints, Bones, Muscles:   ROM:   Stability:  Skin:  No rash/erythema/ecchymoses/petechiae/wounds/abscess/warm/dry  Musculoskeletal:  Full ROM in all joints w/o swelling/tenderness/effusion    NEUROLOGICAL EXAM:  HENT:  Normocephalic head; atraumatic head.  Neck supple.  ENT: normal looking.  Mental State:    Alert.  Fully oriented to person, place and date.  Coherent.  Speech is slurred Cooperative.  Responds appropriately.    Cranial Nerves:  II-XII:   Pupils round and reactive to light and accommodation.  Extraocular movements full.  Visual fields full (no homonymous hemianopsia).  Visual acuity wnl.Right   Facial weakness Tongue midline.  Motor Functions:  Moves all extremities.  No pronator drift of UE.  Claps hand well.  Hand  intact bilaterally.  Ambulatory.    Sensory Functions:   Intact to touch and pinprick to face and extremities.    Reflexes:  Deep tendon reflexes normoactive to biceps, knees and ankles.  Babinski absent (present).  Cerebellar Testing:    Finger to nose intact.  Nystagmus absent.  Neurovascular: Carotid auscultation full without bruits.      LABS:                        14.2   4.80  )-----------( 165      ( 2019 02:46 )             41.1     06-    139  |  101  |  8   ----------------------------<  92  3.1<L>   |  25  |  0.84    Ca    8.9      2019 02:45  Phos  3.3     06-06  Mg     2.4     06-06    TPro  8.0  /  Alb  4.3  /  TBili  2.3<H>  /  DBili  x   /  AST  21  /  ALT  31  /  AlkPhos  64      PT/INR - ( 2019 13:34 )   PT: 13.8 sec;   INR: 1.22 ratio         PTT - ( 2019 13:34 )  PTT:31.5 sec    Urinalysis Basic - ( 2019 14:13 )    Color: Yellow / Appearance: Clear / S.010 / pH: x  Gluc: x / Ketone: Moderate  / Bili: Negative / Urobili: Negative mg/dL   Blood: x / Protein: 30 mg/dL / Nitrite: Negative   Leuk Esterase: Negative / RBC: 0-2 /HPF / WBC x   Sq Epi: x / Non Sq Epi: Occasional / Bacteria: x        RADIOLOGY & ADDITIONAL STUDIES:    CT Brain Stroke Protocol: Urgent   Indication: cva  Transport: Stretcher-Crib  Exam Completed  Provider's Contact #: [phone] ( @ 12:45)  labetalol Injectable: [Known as NORMODYNE Injectable]  10 milliGRAM(s), IV Push, once, Stop After 1 Doses  Provider's Contact #: [phone] ( @ 12:45)  Complete Blood Count + Automated Diff: STAT ( @ 12:48)  Comprehensive Metabolic Panel: STAT ( @ 12:48)  Type + Screen: STAT ( @ 12:48)  ABO Rh Confirmatory Specimen: STAT  Addl Info: Conditional: ABO Rh Confirmatory Specimen ( 12:48)  CKMB Mass Assay: STAT ( @ 12:48)  Troponin I, Serum: STAT ( 12:48)  Magnesium, Serum: STAT ( 12:48)  Thyroid Stimulating Hormone, Serum: STAT ( @ 12:48)  Urinalysis: STAT ( @ 12:48)  Prothrombin Time and INR, Plasma:  Start Date:2019. STAT ( 12:48)  Activated Partial Thromboplastin Time:  Start Date:2019. STAT ( 12:48)  12 Lead ECG:   Provider's Contact #: [phone] ( @ 12:48)  Dysphagia Screening:     Time/Priority:  STAT ( @ 12:48)  POCT  Blood Glucose: 12:37 ( @ 12:49)  National Institutes [employer] Stroke Scale Score:     Time/Priority:  Routine ( @ 13:05)  Vital Signs:     Frequency:  See Frequency Below    Every: 15 minute(s)   For: 2 hour(s)    Every: 30 minute(s)   For: 6 hour(s)    Every: 1 hour(s)   For: 16 hour(s) then every 2 hr ( @ 13:05)  Assess Neurological Status-Post Procedure:     Frequency:  See Frequency Below    Every: 15 minute(s)   For: 2 hour(s)    Every: 30 minute(s)   For: 6 hour(s)    Every: 1 hour(s)   For: 16 hour(s) then every 2 hr    Additional Instructions:  After start of intravenous tissue plasminogen activator (tPA) ( @ 13:05)  Monitor For:     Additional Instructions:  Hematoma formation when using automatic blood pressure monitoring and pneumatic compression stockings. ( @ 13:05)  Assess Bleeding ( @ 13:05)  Dysphagia Screening:     Time/Priority:  Routine ( @ 13:05)  Notify Provider For:     Additional Instructions:  Systolic blood pressure GREATER THAN 180 mmHg ( @ 13:05)  Notify Provider For:     Additional Instructions:  Diastolic blood pressure GREATER THAN 105 mmHg ( @ 13:05)  Notify Provider For:     Additional Instructions:  Neurological decline or if patient complains of headache and STOP tissue plasminogen activator (tPA) Infusion ( @ 13:05)  Activity - Bedrest:     Duration:  24 Hours    Additional Instructions:  Bedrest for first 24 hours after intravenous tissue plasminogen activator (tPA) ( @ 13:05)  Activity - Out of Bed with Assistance:     Additional Instructions:  Start 24 hours post tissue plasminogen activator (tPA) bolus ( @ 13:05)  NO Blood Draw: ;  Duration:  4 Hours    Additional Instructions:  After intravenous tissue plasminogen activator (tPA) infusion ( @ 13:05)  NO Lines: ;  Duration:  24 Hours    Additional Instructions:  No central venous or arterial lines during the first 24 hours after intravenous tissue plasminogen activator (tPA) infusion ( @ 13:05)  Cardiac Monitor INCLUDING Off Unit Tests:     Time/Priority:  Routine ( @ 13:05)  Pulse Oximetry:   Frequency: <Continuous> ( @ 13:05)  NO Urethral Catheter: ;  Duration:  3 Hours    Additional Instructions:  During intravenous tissue plasminogen activator (tPA) infusion and for at least 2 hours after the infusion. ( @ 13:05)  NO Nasogastric Tube: ;  Duration:  24 Hours    Additional Instructions:  After intravenous tissue plasminogen activator (tPA) infusion. ( @ 13:05)  Diet, NPO:      Special Instructions for Nursing:  For the first 12 hours post tissue plasminogen activator (tPA) bolus ( @ 13:05)  Provider to RN:       No antiplatelet or anticoagulant medications for 24 hours AFTER tissue plasminogen activator (tPA) ( @ 13:05)  alteplase    Bolus: [Known as ACTIVASE Bolus]  9 milliGRAM(s) in sterile water 9 milliLiter(s), IV Bolus, once, infuse over 1 Minute(s), Stop After 1 Doses  Special Instructions: Total MAX dose 90 milliGRAM(s)  Provider's Contact #: [phone]     (Calc Info: 0.09 milliGRAM(s)/Kg/DOSE x 102.1 Kg = 9 milliGRAM(s)/Dose     (Requested dose was 0.09 milliGRAM(s) per Kg) ( @ 13:05)  alteplase    IVPB: [Known as ACTIVASE IVPB]  81 milliGRAM(s) in sterile water 81 milliLiter(s), IV Intermittent, once, infuse over 60 Minute(s), Stop After 1 Doses  Special Instructions: Total MAX dose 90 milliGRAM(s)  Provider's Contact #: [phone]     (Calc Info: DOSE CAP limits dose to 0.15362 milliGRAM(s)/Kg/DOSE x 102.1 Kg = 81 milliGRAM(s)/Dose     (Requested dose was 0.81 milliGRAM(s) per Kg) ( @ 13:05)  (Floorstock):   Qty Removed: 1 each ( @ 13:06)  (Floorstock):   Qty Removed: 1 each ( @ 13:06)  Antibody Screen: 13:12 ( @ 13:34)  Admit to Inpatient Level of Care:     Service:  INTENSIVE CARE UNIT    Physician:  Benedicto Tafoya    Additional Instructions:  Diagnosis: Cerebrovascular accident (CVA), unspecified mechanism  Isolation: None  Special Consideration: None ( @ 13:47)  Admit from ED ( @ 13:57)  Urine Microscopic-Add On (NC): 14:00 ( @ 14:24)  CT Angio Head w/ IV Cont: Urgent   Indication: cva  Transport: Stretcher-Crib  Exam Completed  Provider's Contact #: [phone] ( @ 14:32)  Provider to RN:       Sedation awakening trial as per ABCDEF Guidelines ( @ 14:38)  Assess For:     Additional Instructions:  CAM ICU Assessment.  As per ICU policy or at a minimum every shift ( @ 14:38)  Height/Length:     Frequency:  on admission ( @ 14:38)  Weight:     Frequency:  daily ( @ 14:38)  Vital Signs:     Frequency:  every 1 hour    Additional Instructions:  Assess BP, HR, RR, Temp. and SpO2 ( @ 14:38)  Assess Pain:     Frequency:  every 4 hours    Additional Instructions:  As per ABCDEF guideline ( 14:38)  Assess Neurological Status:     Type of Neuro Check:  General    Frequency:  every 4 hours ( 14:38)  Intake and Output - Strict:     Frequency:  every 1 hour (:38)  chlorhexidine 4% Liquid: [Known as HIBICLENS]  1 Application(s), Topical, <User Schedule> ( every 1 day: 07:00 ), Whole Body  Special Instructions: Shower or bathe from the neck down as per guidelines. Leave the chlorhexidine on the skin for at least 2 minutes before rinsing off with water. DO NOT take a bath. AVOID internal genital area  Administration Instructions: external use only  Dispose unused medication in BLACK bin.  Provider's Contact #: [phone] (:38)  Notify Provider For:     Additional Instructions:  Decline or change in neurological status (:38)  Activity - Bedrest:     Additional Instructions:  Reason for Bedrest:__________ (:38)  Blood Glucose Point Of Care Testing:     Frequency:  every 6 hours    Additional Instructions:  If NPO ( 14:39)  Blood Glucose Point Of Care Testing:     Frequency:  every 15 minutes    Additional Instructions:  After carbohydrate administration for hypoglycemia, repeat every 15 minute(s) until blood glucose is GREATER THAN or EQUAL  milliGRAM(s)/deciLiter twice consecutively ( @ 14:39)  Notify Provider For:     Additional Instructions:  Blood glucose LESS THAN 70 milliGRAM(s)/deciLiter or GREATER THAN 400 milliGRAM(s)/deciLiter ( @ 14:39)  Education:     Diabetes    Other: Diet, Exercise    Additional Instructions: Diet, Exercise, Diabetes ( @ 14:39)  insulin lispro (HumaLOG) corrective regimen sliding scale:       1 Unit(s) if Glucose 151 - 200      2 Unit(s) if Glucose 201 - 250      3 Unit(s) if Glucose 251 - 300      4 Unit(s) if Glucose 301 - 350      5 Unit(s) if Glucose 351 - 400      6 Unit(s) if Glucose Greater Than 400 + Contact MD  SubCutaneous, every 6 hours  Special Instructions: Give correctional scale insulin REGARDLESS of PO status NOTIFY Provider for blood glucose LESS THAN 70 milliGRAM(s)/deciLiter or above 400 milliGRAM(s)/deciLiter.  Administration Instructions: *Per Sliding Scale*  Dispose unused medication in BLACK bin.  This is a Look-alike/Sound-alike Medication  Provider's Contact #: [phone] ( @ 14:39)  dextrose 5%.: Solution, 1000 milliLiter(s) infuse at 50 mL/Hr  Special Instructions: Conditional Order: HYPOGLYCEMIA PROTOCOL  Provider's Contact #: [phone] ( @ 14:39)  Administer Carbohydrates:     Additional Instructions:  STAT RESPONSIVE patient and Blood Glucose is LESS THAN 70 milliGRAM(s)/deciLiter: Administer 15 grams of fast acting carbohydrate [e.g., Give 4 ounces of APPLE Juice OR 6 ounces of NON-DIET soda OR 1 tube (15 grams) oral glucose gel (available in Automated Dispensing Machine)] and recheck capillary blood glucose in 15 minutes.  Repeat treatment and recheck glucose every 15 minutes until Blood Glucose is GREATER THAN or EQUAL  milliGRAM(s)/deciLiter and NOTIFY Provider.  HYPOGLYCEMIA PROTOCOL ( @ 14:39)  dextrose 40% Gel: [Known as GLUTOSE 15]  15 Gram(s), Oral, once, PRN for Blood Glucose LESS THAN 70 milliGRAM(s)/deciliter, Stop After 1 Doses  Special Instructions: Conditional Order: HYPOGLYCEMIA PROTOCOL  Administration Instructions: Contents of 37.5 Gram(s) tube delivers 15 Gram(s) dextrose  Provider's Contact #: [phone] ( @ 14:39)  dextrose 50% Injectable:   12.5 Gram(s), IV Push, once, Stop After 1 Doses  Special Instructions: Conditional Order: HYPOGLYCEMIA PROTOCOL  Provider's Contact #: [phone] ( @ 14:39)  dextrose 50% Injectable:   25 Gram(s), IV Push, once, Stop After 1 Doses  Special Instructions: Conditional Order: HYPOGLYCEMIA PROTOCOL  Provider's Contact #: [phone] ( @ 14:39)  dextrose 50% Injectable:   25 Gram(s), IV Push, once, Stop After 1 Doses  Special Instructions: Conditional Order: HYPOGLYCEMIA PROTOCOL  Provider's Contact #: [phone] ( @ 14:39)  glucagon  Injectable:   1 milliGRAM(s), IntraMuscular, once, PRN for Glucose LESS THAN 70 milligrams/deciliter, Stop After 1 Doses  Special Instructions: Conditional Order: HYPOGLYCEMIA PROTOCOL  Provider's Contact #: [phone] ( @ 14:39)  Provider to RN:       UNRESPONSIVE patient and Blood Glucose LESS THAN 70 milliGRAM(s)/deciLiter call Rapid Response.  HYPOGLYCEMIA PROTOCOL ( @ 14:39)  Lipid Profile: AM Sched. Collection: 2019 05:00 ( @ 14:41)  Hemoglobin A1C, Whole Blood: AM Sched. Collection: 2019 05:00 ( @ 14:41)  US Duplex Carotid Arteries Complete, Bilateral: Routine   Indication: stenosis  Transport: Portable  Provider's Contact #: [phone] ( @ 14:41)  TTE Echo Doppler w/o Cont:   Transport: Portable  Monitor: w/ Monitor  Exam Completed  Provider's Contact #: [phone] ( @ 14:41)  VTE Prophylaxis - No Pharmacological Prophylaxis Due To::     Time/Priority:  Routine    Reason For No Pharmacologic VTE Prophylaxis  Bleeding Risk    Additional Instructions:  s/p tpa ( @ 14:41)  Admit to Inpatient Level of Care:     Service:  icu    Physician:  Dr. Tafoya ( @ 14:42)  Basic Metabolic Panel: AM Sched. Collection: 2019 05:00 ( @ 14:43)  Complete Blood Count: AM Sched. Collection: 2019 05:00 ( @ 14:43)  Magnesium, Serum: AM Sched. Collection: 2019 05:00 ( @ 14:43)  Phosphorus Level, Serum: AM Sched. Collection: 2019 05:00 ( @ 14:43)  Hepatitis C Antibody Test: AM Sched. Collection: 2019 04:00 ( @ 16:24)  POCT  Blood Glucose: 17:31 ( @ 17:33)  POCT  Blood Glucose: 23:56 ( @ 23:58)  POCT  Blood Glucose: 05:04 ( @ 05:05)  potassium chloride  10 mEq/100 mL IVPB:   10 milliEquivalent(s) in IV Solution 100 milliLiter(s), IV Intermittent, every 1 hour, infuse over 60 Minute(s), Stop After 3 Doses  Provider's Contact #: [phone] ( @ 05:30)  Chart Check ( @ 07:38)  MR Head No Cont: Routine   Indication: cva  Transport: Stretcher-Crib,  w/ Monitor  Provider's Contact #: [phone] ( @ 08:39)  MR Angio Head No Cont: Routine   Indication: cva  Transport: Stretcher-Crib,  w/ Monitor  Provider's Contact #: [phone] ( @ 08:39)  Consult- Speech Language Evaluation:   *Reason for Consult - Must select at least one choice*     Slurred Speech ( @ 10:31)  Consult- PT Evaluate and Treat:   *Reason for Consult - Must select at least one choice*     Neuro Event  Weight Bearing Restrictions: No ( @ 10:31)  Consult- OT Evaluate and Treat:   *Reason for Consult - Must select at least one choice*     Neuro Event  Weight Bearing Restrictions: No ( @ 10:31)  Intermittent Pneumatic Compression:     Body Side:  Bilateral ( @ 10:31)  DX status post cva     Recommendations:  Brain MRI.  Carotid doppler.  Echocardiogram.     DVT prophylaxis as ordered.  Medications:  start on asa 325 mg po if mri shows no hemorrhage and also DVT prophylaxis will be started

## 2019-06-06 NOTE — SWALLOW BEDSIDE ASSESSMENT ADULT - SWALLOW EVAL: RECOMMENDED FEEDING/EATING TECHNIQUES
maintain upright posture during/after eating for 30 mins/check mouth frequently for oral residue/pocketing/oral hygiene/small sips/bites/alternate food with liquid

## 2019-06-06 NOTE — SPEECH LANGUAGE PATHOLOGY EVALUATION - DESCRIBE:
pt responded to questions following a short story with good accuracy. pt responded yes when asked if he had a wife and children. pt followed two step directions such as, "touch your shoulder then touch your elbow". pt responded to questions following a short story

## 2019-06-06 NOTE — PHYSICAL THERAPY INITIAL EVALUATION ADULT - ADDITIONAL COMMENTS
Patient lives in a house c his wife c 16 steps and L rail to enter.  Once inside, patient has 15 steps and L rail to bedroom.  Patient is independent with ambulation and ADLs.

## 2019-06-06 NOTE — SPEECH LANGUAGE PATHOLOGY EVALUATION - COMMENTS
CT Head 6/5/2019 IMPRESSION:1. No occlusion identified.2. Narrowing of both middle cerebral arteries in the M1 region, with mild narrowing on the left, and moderate to severe narrowing on the right. 3. Irregularity of multiple distal cortical branches of the middle and posterior cerebral artery suggesting the possibility of atherosclerotic change versus vasculopathy. No occlusion seen.4. The vertebrobasilar system is tortuous but patent. . No internal carotid occlusion or dissection suggested. Cookie theft/expository speech- pt described picture scene with 3 single word, "flood, falling, cookies". pt did not produce syntactically complex/varied sentences that were detailed, and organized for a well sequenced story. Cookie theft/expository speech- pt described picture scene with 3 single words, "flood, falling, cookies". pt did not produce syntactically complex/varied sentences that were detailed, and organized for a well sequenced story.

## 2019-06-06 NOTE — PHYSICAL THERAPY INITIAL EVALUATION ADULT - BED MOBILITY TRAINING, PT EVAL
Pt will independently perform all aspects of bed mobility to help prevent pressure ulcers, by 3 weeks.

## 2019-06-06 NOTE — SPEECH LANGUAGE PATHOLOGY EVALUATION - SLP GENERAL OBSERVATIONS
pt seen at bedside. alert and oriented x 3-4. pt responded to autobiographical/orientation questions with good accuracy. pt was able to follow directions. pt seen at bedside, alert and motivated to participate in assessment. administered subtests from the BDAE short form.

## 2019-06-06 NOTE — PHYSICAL THERAPY INITIAL EVALUATION ADULT - ORIENTATION, REHAB EVAL
oriented to person, place, time and situation/[facility] Contin[facility] Level 4A: Pt. can integrate multiple pieces of information for a task but tends to be concrete and have difficulty sequencing the task. Pt. can begin simple problem solving.

## 2019-06-06 NOTE — SWALLOW BEDSIDE ASSESSMENT ADULT - SPECIFY REASON(S)
NPO until further recommendations MD order stated NPO until further recommendations made. s/p CVA.  RN and NP reported pt passed dysphagia screen- thin liquids and solid MD order stated NPO until further recommendations made. s/p CVA.  RN and NP reported pt passed dysphagia screen- thin liquids and solid. Ok for SLP to do swallow eval

## 2019-06-06 NOTE — SWALLOW BEDSIDE ASSESSMENT ADULT - COMMENTS
CT Head 6/5/2019 IMPRESSION:1. No occlusion identified.2. Narrowing of both middle cerebral arteries in the M1 region, with mild narrowing on the left, and moderate to severe narrowing on the right. 3. Irregularity of multiple distal cortical branches of the middle and posterior cerebral artery suggesting the possibility of atherosclerotic change versus vasculopathy. No occlusion seen.4. The vertebrobasilar system is tortuous but patent. . No internal carotid occlusion or dissection suggested.

## 2019-06-06 NOTE — SWALLOW BEDSIDE ASSESSMENT ADULT - SWALLOW EVAL: DIAGNOSIS
oropharyngeal phases of swallow marked by weakness in right buccal cavity and tongue movement, reduced mastication and bolus formation causing midline trace lingual residue, SLP cued patient to clear. suspect timely swallow trigger with good elevation and no overt signs of aspiration. oropharyngeal phases of swallow marked by weakness of right buccal cavity and reduced lingual ROM/strength, reduced mastication time and bolus formation causing trace-mild midline lingual residue with solid, SLP cued patient to clear. suspect timely swallow trigger with good laryngeal elevation and no overt signs of aspiration.

## 2019-06-06 NOTE — SPEECH LANGUAGE PATHOLOGY EVALUATION - SLP DIAGNOSIS
pt presented with receptive language WNL, slight deficits in expressive marked by reduced picture description/expository pt presented with receptive language WNL, slight deficits in expressive marked by reduced picture description/expository and dysarthria marked by irregular articulatory breakdowns, distorted vowels, decreased breath support/coordination with phonation, low volume, monopitch and slow rate pt presented with receptive language WNL, slight deficits in expressive marked by reduced picture description/expository and dysarthria characterized by irregular articulatory breakdowns, distorted vowels, decreased breath support/coordination with phonation, low volume, monopitch and slow rate. pt presented with receptive language WNL, slight deficits in expressive marked by reduced picture description/expository speech and dysarthria characterized by irregular articulatory breakdowns, distorted vowels, decreased breath support/coordination with phonation, low volume, monopitch and slow rate.

## 2019-06-06 NOTE — SWALLOW BEDSIDE ASSESSMENT ADULT - SWALLOW EVAL: RECOMMENDED DIET
dysphagia 2 mechanical soft with thin liquids when medically appropriate initiate po diet dysphagia 2 mechanical soft with thin liquids

## 2019-06-06 NOTE — PHYSICAL THERAPY INITIAL EVALUATION ADULT - TRANSFER TRAINING, PT EVAL
Pt will independently perform sit to/from stand transfers without LOB using rolling walker by 1 week.

## 2019-06-06 NOTE — DIETITIAN INITIAL EVALUATION ADULT. - OTHER INFO
Pt seen for ICU admission. Pt lives with wife; pt does cooking & food shopping PTA. Pt managed DM type 2 with Tradjenta 5mg daily. Pt SMBG ~1 x week PTA. Pt with s/p CVA with TPA; s/p swallow evaluation 6/6 recommend Mechanical soft/thin liquids. Pt consumed 50% lunch today. Last BM ?

## 2019-06-06 NOTE — DIETITIAN INITIAL EVALUATION ADULT. - NS AS NUTRI INTERV MEALS SNACK
Recommend Mechanical soft/thin liquids/Low Na/CCHO with snack/Carbohydrate - modified diet/Mineral - modified diet/Texture-modified diet Carbohydrate - modified diet/Texture-modified diet/Mineral - modified diet/Recommend Mechanical soft/thin liquids/Low Na/CCHO with snack when medically feasible

## 2019-06-06 NOTE — SPEECH LANGUAGE PATHOLOGY EVALUATION - SLP CRITERIA FOR SKILLED THERAPEUTIC INTERVENTIONS MET
no problems identified which require skilled intervention skilled criteria for intervention met/no problems identified which require skilled intervention

## 2019-06-06 NOTE — SPEECH LANGUAGE PATHOLOGY EVALUATION - H & P REVIEW
Patient is a 62 yo M with a pmhx of HTN, DM who presents to the ED with right facial droop. Patients symptoms began last night around 6pm without neurological deficits. Patient then developed facial droop and dysarthria this morning at 10am. Patient admitted to ICU for CVA s/p tPA treatment. Denies paresthesias weakness, dizziness, vision loss, LOC, chest pain, SOB, N/V/D, fever, and urinary symptoms. In the ED patient was code stroke, CTA Brain neg for intracranial hemorrhage and involutional and ischemic gliotic changes. Patient received tPA and labetelol. BP of 235/108 was reduced to 156/75./yes

## 2019-06-06 NOTE — SPEECH LANGUAGE PATHOLOGY EVALUATION - SLP CONVERSATIONAL SPEECH
pt spontaneously responded to personal questions. pt speech intelligibility fair to moderate due to imprecise articulators and breath support. pt described a picture scene using one word responses, lacked detail.

## 2019-06-06 NOTE — PROGRESS NOTE ADULT - ASSESSMENT
62 yo M HTN DM a/w R facial droop and dysarthria s/p tPA @ 1305.      Neuro: Acute CVA s/p tPA with residual R facial droop.  Awaiting repeat imaging.  Will start Aspirin if head imaging is negative for ICH and lipitor tonight.  Follow up carotid dopplers, and echo.    CV: Continue on telemetry; start lipitor tonight; re-introduce home BP meds; follow up echo  Pulm: NILDA  GI: Soft diet  Renal/Metabolic: NILDA  ID: NILDA  Endo: DM A1C 5.2; will continue home regimen  Dispo: Awaiting repeat imaging.  If stable will start aspirin and will transfer to telemetry.      Attending Time Spent 30 Minutes 62 yo M HTN DM a/w R facial droop and dysarthria s/p tPA @ 1305.      Neuro: Acute CVA s/p tPA with residual R facial droop.  Awaiting repeat imaging.  Will start Aspirin if head imaging is negative for ICH and lipitor tonight.  Follow up carotid dopplers, and echo.    CV: Continue on telemetry until repeat MRI, no events on telemetry; start lipitor tonight; re-introduce home BP meds; follow up echo  Pulm: NILDA  GI: Soft diet  Renal/Metabolic: NILDA  ID: NILDA  Endo: DM A1C 5.2; will continue home regimen  Dispo: Awaiting repeat imaging.  If stable will start aspirin and will transfer to med/surg given stenosis noted on CT angio and no events on telemetry.  .      Attending Time Spent 30 Minutes

## 2019-06-06 NOTE — PROGRESS NOTE ADULT - SUBJECTIVE AND OBJECTIVE BOX
INTERVAL HPI/OVERNIGHT EVENTS:      HPI: Patient is a 62 yo M with a pmhx of HTN, DM who presents to the ED with right facial droop. Patients symptoms began last night around 6pm without neurological deficits. Patient then developed facial droop and dysarthria this morning at 10am. Patient admitted to ICU for CVA s/p tPA treatment. Denies paresthesias weakness, dizziness, vision loss, LOC, chest pain, SOB, N/V/D, fever, and urinary symptoms.     In the ED patient was code stroke, CTA Brain neg for intracranial hemorrhage and involutional and ischemic gliotic changes. Patient received tPA and labetelol. BP of 235/108 was reduced to 156/75.     24 hour events: Awaiting MRI, continues to have right facial droop and slight dysarthria.      CENTRAL LINE: [ ] YES [x ] NO  LOCATION:   DATE INSERTED:  REMOVE: [ ] YES [ ] NO  EXPLAIN:    ATWOOD: [ ] YES [x ] NO    DATE INSERTED:  REMOVE:  [ ] YES [ ] NO  EXPLAIN:    A-LINE:  [ ] YES [x ] NO  LOCATION:   DATE INSERTED:  REMOVE:  [ ] YES [ ] NO  EXPLAIN:    REVIEW OF SYSTEMS:   CONSTITUTIONAL: No fever, weight loss, or fatigue  EYES: No eye pain, visual disturbances, or discharge  ENMT:  No difficulty hearing, tinnitus, vertigo; No sinus or throat pain  NECK: No pain or stiffness  RESPIRATORY: No cough, wheezing, chills or hemoptysis; No shortness of breath  CARDIOVASCULAR: No chest pain, palpitations, dizziness, or leg swelling  GASTROINTESTINAL: No abdominal or epigastric pain. No nausea, vomiting, or hematemesis; No diarrhea or constipation. No melena or hematochezia.  GENITOURINARY: No dysuria, frequency, hematuria, or incontinence  NEUROLOGICAL: No headaches, memory loss, loss of strength, numbness, or tremors  SKIN: No itching, burning, rashes, or lesions     ICU Vital Signs Last 24 Hrs  T(C): 36.7 (2019 12:07), Max: 37.4 (2019 15:32)  T(F): 98.1 (2019 12:07), Max: 99.3 (2019 15:32)  HR: 70 (2019 12:30) (56 - 84)  BP: 180/82 (2019 12:00) (135/74 - 187/75)  BP(mean): 111 (2019 12:00) (89 - 118)  ABP: --  ABP(mean): --  RR: 18 (2019 12:30) (4 - 24)  SpO2: 100% (2019 12:30) (90% - 100%)      I&O's Detail    2019 07:01  -  2019 07:00  --------------------------------------------------------  IN:  Total IN: 0 mL    OUT:    Indwelling Catheter - Urethral: 2400 mL  Total OUT: 2400 mL    Total NET: -2400 mL              MEDICATIONS  NEURO  Meds:   RESPIRATORY    Meds:   CARDIOVASCULAR  Meds:   GI/NUTRITION  Meds:   GENITOURINARY  Meds: dextrose 5%. 1000 milliLiter(s) IV Continuous <Continuous>    HEMATOLOGIC  Meds:   [x] VTE Prophylaxis  INFECTIOUS DISEASES  Meds:   ENDOCRINE  CAPILLARY BLOOD GLUCOSE      POCT Blood Glucose.: 124 mg/dL (2019 12:34)  POCT Blood Glucose.: 108 mg/dL (2019 05:04)  POCT Blood Glucose.: 90 mg/dL (2019 23:56)  POCT Blood Glucose.: 114 mg/dL (2019 17:31)    Meds: dextrose 40% Gel 15 Gram(s) Oral once PRN  dextrose 50% Injectable 12.5 Gram(s) IV Push once  dextrose 50% Injectable 25 Gram(s) IV Push once  dextrose 50% Injectable 25 Gram(s) IV Push once  glucagon  Injectable 1 milliGRAM(s) IntraMuscular once PRN  insulin lispro (HumaLOG) corrective regimen sliding scale   SubCutaneous every 6 hours    OTHER MEDICATIONS:  chlorhexidine 4% Liquid 1 Application(s) Topical <User Schedule>  :    PHYSICAL EXAM:    GENERAL: NAD, well-groomed, well-developed  HEAD:  Atraumatic, Normocephalic  EYES: EOMI, PERRLA, conjunctiva and sclera clear  ENMT: No tonsillar erythema, exudates, or enlargement; Moist mucous membranes, No lesions  NECK: Supple, No JVD, Normal thyroid  CHEST/LUNG: Clear to auscultation bilaterally; No rales, rhonchi, wheezing, or rubs  HEART: Regular rate and rhythm; No murmurs, rubs, or gallops  ABDOMEN: Soft, Nontender, Nondistended; Bowel sounds present  EXTREMITIES:  2+ Peripheral Pulses, No clubbing, cyanosis, or edema  LYMPH: No lymphadenopathy noted  SKIN: No rashes or lesions  NERVOUS SYSTEM:  Alert & Oriented X3, Good concentration; R sided facial droop, very slight R sided drift, R Motor Strength 5/5 B/L upper and lower extremities; DTRs 2+ intact and symmetric    LABS:                        14.2   4.80  )-----------( 165      ( 2019 02:46 )             41.1      06-06    139  |  101  |  8   ----------------------------<  92  3.1<L>   |  25  |  0.84    Ca    8.9      2019 02:45  Phos  3.3     06-06  Mg     2.4     06-06    TPro  8.0  /  Alb  4.3  /  TBili  2.3<H>  /  DBili  x   /  AST  21  /  ALT  31  /  AlkPhos  64  06-05    PT/INR - ( 2019 13:34 )   PT: 13.8 sec;   INR: 1.22 ratio         PTT - ( 2019 13:34 )  PTT:31.5 sec  Urinalysis Basic - ( 2019 14:13 )    Color: Yellow / Appearance: Clear / S.010 / pH: x  Gluc: x / Ketone: Moderate  / Bili: Negative / Urobili: Negative mg/dL   Blood: x / Protein: 30 mg/dL / Nitrite: Negative   Leuk Esterase: Negative / RBC: 0-2 /HPF / WBC x   Sq Epi: x / Non Sq Epi: Occasional / Bacteria: x      GLOBAL ISSUE/BEST PRACTICE:  Analgesia: N/A  Sedation: NA  HOB elevation: yes  Stress ulcer prophylaxis:NA	  VTE prophylaxis: s/p tPA  Oral Care: chlorhexidine  Glycemic control: NA  Nutrition: puree diet    RADIOLOGY & ADDITIONAL STUDIES:    CULTURES INTERVAL HPI/OVERNIGHT EVENTS:      HPI: Patient is a 64 yo M with a pmhx of HTN, DM who presents to the ED with right facial droop. Patients symptoms began last night around 6pm without neurological deficits. Patient then developed facial droop and dysarthria this morning at 10am. Patient admitted to ICU for CVA s/p tPA treatment. Denies paresthesias weakness, dizziness, vision loss, LOC, chest pain, SOB, N/V/D, fever, and urinary symptoms.     In the ED patient was code stroke, CTA Brain neg for intracranial hemorrhage and involutional and ischemic gliotic changes. Patient received tPA and labetelol. BP of 235/108 was reduced to 156/75.     24 hour events: Awaiting MRI, continues to have right facial droop and slight dysarthria.  No events on telemetry.      CENTRAL LINE: [ ] YES [x ] NO  LOCATION:   DATE INSERTED:  REMOVE: [ ] YES [ ] NO  EXPLAIN:    ATWOOD: [ ] YES [x ] NO    DATE INSERTED:  REMOVE:  [ ] YES [ ] NO  EXPLAIN:    A-LINE:  [ ] YES [x ] NO  LOCATION:   DATE INSERTED:  REMOVE:  [ ] YES [ ] NO  EXPLAIN:    REVIEW OF SYSTEMS:   CONSTITUTIONAL: No fever, weight loss, or fatigue  EYES: No eye pain, visual disturbances, or discharge  ENMT:  No difficulty hearing, tinnitus, vertigo; No sinus or throat pain  NECK: No pain or stiffness  RESPIRATORY: No cough, wheezing, chills or hemoptysis; No shortness of breath  CARDIOVASCULAR: No chest pain, palpitations, dizziness, or leg swelling  GASTROINTESTINAL: No abdominal or epigastric pain. No nausea, vomiting, or hematemesis; No diarrhea or constipation. No melena or hematochezia.  GENITOURINARY: No dysuria, frequency, hematuria, or incontinence  NEUROLOGICAL: No headaches, memory loss, loss of strength, numbness, or tremors  SKIN: No itching, burning, rashes, or lesions     ICU Vital Signs Last 24 Hrs  T(C): 36.7 (2019 12:07), Max: 37.4 (2019 15:32)  T(F): 98.1 (2019 12:07), Max: 99.3 (2019 15:32)  HR: 70 (2019 12:30) (56 - 84)  BP: 180/82 (2019 12:00) (135/74 - 187/75)  BP(mean): 111 (2019 12:00) (89 - 118)  ABP: --  ABP(mean): --  RR: 18 (2019 12:30) (4 - 24)  SpO2: 100% (2019 12:30) (90% - 100%)      I&O's Detail    2019 07:01  -  2019 07:00  --------------------------------------------------------  IN:  Total IN: 0 mL    OUT:    Indwelling Catheter - Urethral: 2400 mL  Total OUT: 2400 mL    Total NET: -2400 mL              MEDICATIONS  NEURO  Meds:   RESPIRATORY    Meds:   CARDIOVASCULAR  Meds:   GI/NUTRITION  Meds:   GENITOURINARY  Meds: dextrose 5%. 1000 milliLiter(s) IV Continuous <Continuous>    HEMATOLOGIC  Meds:   [x] VTE Prophylaxis  INFECTIOUS DISEASES  Meds:   ENDOCRINE  CAPILLARY BLOOD GLUCOSE      POCT Blood Glucose.: 124 mg/dL (2019 12:34)  POCT Blood Glucose.: 108 mg/dL (2019 05:04)  POCT Blood Glucose.: 90 mg/dL (2019 23:56)  POCT Blood Glucose.: 114 mg/dL (2019 17:31)    Meds: dextrose 40% Gel 15 Gram(s) Oral once PRN  dextrose 50% Injectable 12.5 Gram(s) IV Push once  dextrose 50% Injectable 25 Gram(s) IV Push once  dextrose 50% Injectable 25 Gram(s) IV Push once  glucagon  Injectable 1 milliGRAM(s) IntraMuscular once PRN  insulin lispro (HumaLOG) corrective regimen sliding scale   SubCutaneous every 6 hours    OTHER MEDICATIONS:  chlorhexidine 4% Liquid 1 Application(s) Topical <User Schedule>  :    PHYSICAL EXAM:    GENERAL: NAD, well-groomed, well-developed  HEAD:  Atraumatic, Normocephalic  EYES: EOMI, PERRLA, conjunctiva and sclera clear  ENMT: No tonsillar erythema, exudates, or enlargement; Moist mucous membranes, No lesions  NECK: Supple, No JVD, Normal thyroid  CHEST/LUNG: Clear to auscultation bilaterally; No rales, rhonchi, wheezing, or rubs  HEART: Regular rate and rhythm; No murmurs, rubs, or gallops  ABDOMEN: Soft, Nontender, Nondistended; Bowel sounds present  EXTREMITIES:  2+ Peripheral Pulses, No clubbing, cyanosis, or edema  LYMPH: No lymphadenopathy noted  SKIN: No rashes or lesions  NERVOUS SYSTEM:  Alert & Oriented X3, Good concentration; R sided facial droop, very slight R sided drift, R Motor Strength 5/5 B/L upper and lower extremities; DTRs 2+ intact and symmetric    LABS:                        14.2   4.80  )-----------( 165      ( 2019 02:46 )             41.1      06-06    139  |  101  |  8   ----------------------------<  92  3.1<L>   |  25  |  0.84    Ca    8.9      2019 02:45  Phos  3.3     06-06  Mg     2.4     06-06    TPro  8.0  /  Alb  4.3  /  TBili  2.3<H>  /  DBili  x   /  AST  21  /  ALT  31  /  AlkPhos  64  06-05    PT/INR - ( 2019 13:34 )   PT: 13.8 sec;   INR: 1.22 ratio         PTT - ( 2019 13:34 )  PTT:31.5 sec  Urinalysis Basic - ( 2019 14:13 )    Color: Yellow / Appearance: Clear / S.010 / pH: x  Gluc: x / Ketone: Moderate  / Bili: Negative / Urobili: Negative mg/dL   Blood: x / Protein: 30 mg/dL / Nitrite: Negative   Leuk Esterase: Negative / RBC: 0-2 /HPF / WBC x   Sq Epi: x / Non Sq Epi: Occasional / Bacteria: x      GLOBAL ISSUE/BEST PRACTICE:  Analgesia: N/A  Sedation: NA  HOB elevation: yes  Stress ulcer prophylaxis:NA	  VTE prophylaxis: s/p tPA  Oral Care: chlorhexidine  Glycemic control: NA  Nutrition: puree diet    RADIOLOGY & ADDITIONAL STUDIES:    CULTURES

## 2019-06-06 NOTE — PHYSICAL THERAPY INITIAL EVALUATION ADULT - GAIT DEVIATIONS NOTED, PT EVAL
decreased step length/decreased karolyn/decreased weight-shifting ability/increased time in double stance/decreased velocity of limb motion/decreased stride length/decreased swing-to-stance ratio

## 2019-06-06 NOTE — SWALLOW BEDSIDE ASSESSMENT ADULT - H & P REVIEW
Patient is a 64 yo M with a pmhx of HTN, DM who presents to the ED with right facial droop. Patients symptoms began last night around 6pm without neurological deficits. Patient then developed facial droop and dysarthria this morning at 10am. Patient admitted to ICU for CVA s/p tPA treatment. Denies paresthesias weakness, dizziness, vision loss, LOC, chest pain, SOB, N/V/D, fever, and urinary symptoms./yes

## 2019-06-06 NOTE — SWALLOW BEDSIDE ASSESSMENT ADULT - SLP GENERAL OBSERVATIONS
pt seen bedside. alert and oriented x 3-4. pt responded to autobiographical/orientation questions with good accuracy, he verbalized needs and was able to follow one step directions. mild to moderate dysarthria with fair to good speech intelligibilty depending on utterance length and complexity. dysarthria marked by reduced articulatory precision and breath support. pt seen bedside. alert and oriented x 3-4. pt responded to autobiographical/orientation questions with good accuracy, he verbalized needs and was able to follow one step directions. noted mild to moderate dysarthria with fair to good speech intelligibilty depending on utterance length and complexity. dysarthria marked by reduced articulatory precision and decreased breath support/coordination with phonation.

## 2019-06-07 ENCOUNTER — TRANSCRIPTION ENCOUNTER (OUTPATIENT)
Age: 64
End: 2019-06-07

## 2019-06-07 DIAGNOSIS — I63.9 CEREBRAL INFARCTION, UNSPECIFIED: ICD-10-CM

## 2019-06-07 DIAGNOSIS — I50.32 CHRONIC DIASTOLIC (CONGESTIVE) HEART FAILURE: ICD-10-CM

## 2019-06-07 DIAGNOSIS — E11.9 TYPE 2 DIABETES MELLITUS WITHOUT COMPLICATIONS: ICD-10-CM

## 2019-06-07 DIAGNOSIS — I10 ESSENTIAL (PRIMARY) HYPERTENSION: ICD-10-CM

## 2019-06-07 DIAGNOSIS — R13.12 DYSPHAGIA, OROPHARYNGEAL PHASE: ICD-10-CM

## 2019-06-07 LAB
ANION GAP SERPL CALC-SCNC: 7 MMOL/L — SIGNIFICANT CHANGE UP (ref 5–17)
BUN SERPL-MCNC: 13 MG/DL — SIGNIFICANT CHANGE UP (ref 7–23)
CALCIUM SERPL-MCNC: 9.3 MG/DL — SIGNIFICANT CHANGE UP (ref 8.5–10.1)
CHLORIDE SERPL-SCNC: 102 MMOL/L — SIGNIFICANT CHANGE UP (ref 96–108)
CO2 SERPL-SCNC: 29 MMOL/L — SIGNIFICANT CHANGE UP (ref 22–31)
CREAT SERPL-MCNC: 1 MG/DL — SIGNIFICANT CHANGE UP (ref 0.5–1.3)
GLUCOSE BLDC GLUCOMTR-MCNC: 111 MG/DL — HIGH (ref 70–99)
GLUCOSE BLDC GLUCOMTR-MCNC: 123 MG/DL — HIGH (ref 70–99)
GLUCOSE BLDC GLUCOMTR-MCNC: 128 MG/DL — HIGH (ref 70–99)
GLUCOSE BLDC GLUCOMTR-MCNC: 200 MG/DL — HIGH (ref 70–99)
GLUCOSE SERPL-MCNC: 121 MG/DL — HIGH (ref 70–99)
HCT VFR BLD CALC: 47.7 % — SIGNIFICANT CHANGE UP (ref 39–50)
HGB BLD-MCNC: 16.2 G/DL — SIGNIFICANT CHANGE UP (ref 13–17)
MAGNESIUM SERPL-MCNC: 2.5 MG/DL — SIGNIFICANT CHANGE UP (ref 1.6–2.6)
MCHC RBC-ENTMCNC: 29.5 PG — SIGNIFICANT CHANGE UP (ref 27–34)
MCHC RBC-ENTMCNC: 34 GM/DL — SIGNIFICANT CHANGE UP (ref 32–36)
MCV RBC AUTO: 86.7 FL — SIGNIFICANT CHANGE UP (ref 80–100)
NRBC # BLD: 0 /100 WBCS — SIGNIFICANT CHANGE UP (ref 0–0)
PHOSPHATE SERPL-MCNC: 3 MG/DL — SIGNIFICANT CHANGE UP (ref 2.5–4.5)
PLATELET # BLD AUTO: 161 K/UL — SIGNIFICANT CHANGE UP (ref 150–400)
POTASSIUM SERPL-MCNC: 3.7 MMOL/L — SIGNIFICANT CHANGE UP (ref 3.5–5.3)
POTASSIUM SERPL-SCNC: 3.7 MMOL/L — SIGNIFICANT CHANGE UP (ref 3.5–5.3)
RBC # BLD: 5.5 M/UL — SIGNIFICANT CHANGE UP (ref 4.2–5.8)
RBC # FLD: 13.2 % — SIGNIFICANT CHANGE UP (ref 10.3–14.5)
SODIUM SERPL-SCNC: 138 MMOL/L — SIGNIFICANT CHANGE UP (ref 135–145)
WBC # BLD: 4.81 K/UL — SIGNIFICANT CHANGE UP (ref 3.8–10.5)
WBC # FLD AUTO: 4.81 K/UL — SIGNIFICANT CHANGE UP (ref 3.8–10.5)

## 2019-06-07 PROCEDURE — 93880 EXTRACRANIAL BILAT STUDY: CPT | Mod: 26

## 2019-06-07 PROCEDURE — 99233 SBSQ HOSP IP/OBS HIGH 50: CPT

## 2019-06-07 RX ORDER — ASPIRIN/CALCIUM CARB/MAGNESIUM 324 MG
1 TABLET ORAL
Qty: 0 | Refills: 0 | DISCHARGE
Start: 2019-06-07

## 2019-06-07 RX ORDER — ATORVASTATIN CALCIUM 80 MG/1
1 TABLET, FILM COATED ORAL
Qty: 0 | Refills: 0 | DISCHARGE
Start: 2019-06-07

## 2019-06-07 RX ADMIN — PANTOPRAZOLE SODIUM 40 MILLIGRAM(S): 20 TABLET, DELAYED RELEASE ORAL at 07:29

## 2019-06-07 RX ADMIN — Medication 325 MILLIGRAM(S): at 11:28

## 2019-06-07 RX ADMIN — LOSARTAN POTASSIUM 100 MILLIGRAM(S): 100 TABLET, FILM COATED ORAL at 05:49

## 2019-06-07 RX ADMIN — ATORVASTATIN CALCIUM 40 MILLIGRAM(S): 80 TABLET, FILM COATED ORAL at 22:56

## 2019-06-07 RX ADMIN — AMLODIPINE BESYLATE 5 MILLIGRAM(S): 2.5 TABLET ORAL at 05:50

## 2019-06-07 NOTE — OCCUPATIONAL THERAPY INITIAL EVALUATION ADULT - SOCIAL CONCERNS
Pt voiced concerns about current dx of CVA and inability to ambulate, care for himself independently and function in his roles./Complex psychosocial needs/coping issues Pt voiced concerns about current dx of CVA, inability to ambulate, care for himself independently and function in his roles./Complex psychosocial needs/coping issues

## 2019-06-07 NOTE — PROGRESS NOTE ADULT - SUBJECTIVE AND OBJECTIVE BOX
Patient is a 63y old  Male who presents with a chief complaint of CVA on tPA (2019 10:27)      INTERVAL HPI/OVERNIGHT EVENTS:  pt. transferred out of icu. feels well except for continued dysarthria and right facial droop. tolerating dysphagia diet.    MEDICATIONS  (STANDING):  amLODIPine   Tablet 5 milliGRAM(s) Oral daily  aspirin 325 milliGRAM(s) Oral daily  atorvastatin 40 milliGRAM(s) Oral at bedtime  dextrose 5%. 1000 milliLiter(s) (50 mL/Hr) IV Continuous <Continuous>  dextrose 50% Injectable 12.5 Gram(s) IV Push once  dextrose 50% Injectable 25 Gram(s) IV Push once  dextrose 50% Injectable 25 Gram(s) IV Push once  insulin lispro (HumaLOG) corrective regimen sliding scale   SubCutaneous at bedtime  insulin lispro (HumaLOG) corrective regimen sliding scale   SubCutaneous three times a day before meals  losartan 100 milliGRAM(s) Oral daily  pantoprazole    Tablet 40 milliGRAM(s) Oral before breakfast    MEDICATIONS  (PRN):  dextrose 40% Gel 15 Gram(s) Oral once PRN Blood Glucose LESS THAN 70 milliGRAM(s)/deciliter  glucagon  Injectable 1 milliGRAM(s) IntraMuscular once PRN Glucose LESS THAN 70 milligrams/deciliter      Allergies    No Known Allergies    Intolerances        REVIEW OF SYSTEMS:  CONSTITUTIONAL: No fever, weight loss, or fatigue  EYES: No eye pain, visual disturbances, or discharge  ENMT:  No difficulty hearing, tinnitus, vertigo; No sinus or throat pain  NECK: No pain or stiffness  RESPIRATORY: No cough, wheezing, chills or hemoptysis; No shortness of breath  CARDIOVASCULAR: No chest pain, palpitations, dizziness, or leg swelling  GASTROINTESTINAL: No abdominal or epigastric pain. No nausea, vomiting, or hematemesis; No diarrhea or constipation. No melena or hematochezia.  GENITOURINARY: No dysuria, frequency, hematuria, or incontinence  NEUROLOGICAL: No headaches, memory loss, loss of strength, numbness, or tremors, + difficulty speaking, tolerating soft diet  MUSCULOSKELETAL: No joint pain or swelling; No muscle, back, or extremity pain  PSYCHIATRIC: No depression, anxiety, mood swings, or difficulty sleeping      Vital Signs Last 24 Hrs  T(C): 36 (2019 05:57), Max: 37.1 (2019 00:47)  T(F): 96.8 (2019 05:57), Max: 98.8 (2019 00:47)  HR: 90 (2019 09:40) (58 - 90)  BP: 186/90 (2019 09:40) (126/71 - 195/90)  BP(mean): 96 (2019 23:00) (89 - 123)  RR: 17 (2019 05:57) (16 - 27)  SpO2: 98% (2019 09:40) (94% - 100%)    PHYSICAL EXAM:  GENERAL: NAD, well-groomed, well-developed, sitting in chair, dysarthric (but comprehensible speech),   HEAD:  Atraumatic, Normocephalic, Right facial droop  EYES: EOMI, PERRLA, conjunctiva and sclera clear  ENMT: No tonsillar erythema, exudates, or enlargement; Moist mucous membranes, Good dentition, No lesions  NERVOUS SYSTEM:  Alert & Oriented X3, Good concentration; Motor Strength grossly intact  CHEST/LUNG: Clear to percussion bilaterally; No rales, rhonchi, wheezing, or rubs  HEART: Regular rate and rhythm; No murmurs, rubs, or gallops  ABDOMEN: Soft, Nontender, Nondistended; Bowel sounds present  EXTREMITIES:  2+ Peripheral Pulses, No clubbing, cyanosis, or edema  LYMPH: No lymphadenopathy noted  SKIN: No rashes or lesions    LABS:                        16.2   4.81  )-----------( 161      ( 2019 05:44 )             47.7     06-07    138  |  102  |  13  ----------------------------<  121<H>  3.7   |  29  |  1.00    Ca    9.3      2019 05:44  Phos  3.0     06-07  Mg     2.5     06-07    TPro  8.0  /  Alb  4.3  /  TBili  2.3<H>  /  DBili  x   /  AST  21  /  ALT  31  /  AlkPhos  64  06-05    PT/INR - ( 2019 13:34 )   PT: 13.8 sec;   INR: 1.22 ratio         PTT - ( 2019 13:34 )  PTT:31.5 sec  Urinalysis Basic - ( 2019 14:13 )    Color: Yellow / Appearance: Clear / S.010 / pH: x  Gluc: x / Ketone: Moderate  / Bili: Negative / Urobili: Negative mg/dL   Blood: x / Protein: 30 mg/dL / Nitrite: Negative   Leuk Esterase: Negative / RBC: 0-2 /HPF / WBC x   Sq Epi: x / Non Sq Epi: Occasional / Bacteria: x      CAPILLARY BLOOD GLUCOSE      POCT Blood Glucose.: 200 mg/dL (2019 11:14)  POCT Blood Glucose.: 111 mg/dL (2019 07:28)  POCT Blood Glucose.: 112 mg/dL (2019 21:08)  POCT Blood Glucose.: 132 mg/dL (2019 17:53)  POCT Blood Glucose.: 124 mg/dL (2019 12:34)      RADIOLOGY & ADDITIONAL TESTS:    Imaging Personally Reviewed:  [x ] YES  [ ] NO    Consultant(s) Notes Reviewed:  [x ] YES  [ ] NO    Care Discussed with Consultants/Other Providers [x ] YES  [ ] NO Patient is a 63y old  Male who presents with a chief complaint of CVA on tPA (2019 10:27)      INTERVAL HPI/OVERNIGHT EVENTS:  pt. transferred out of icu. feels well except for continued dysarthria and right facial droop. tolerating dysphagia diet.    MEDICATIONS  (STANDING):  amLODIPine   Tablet 5 milliGRAM(s) Oral daily  aspirin 325 milliGRAM(s) Oral daily  atorvastatin 40 milliGRAM(s) Oral at bedtime  dextrose 5%. 1000 milliLiter(s) (50 mL/Hr) IV Continuous <Continuous>  dextrose 50% Injectable 12.5 Gram(s) IV Push once  dextrose 50% Injectable 25 Gram(s) IV Push once  dextrose 50% Injectable 25 Gram(s) IV Push once  insulin lispro (HumaLOG) corrective regimen sliding scale   SubCutaneous at bedtime  insulin lispro (HumaLOG) corrective regimen sliding scale   SubCutaneous three times a day before meals  losartan 100 milliGRAM(s) Oral daily  pantoprazole    Tablet 40 milliGRAM(s) Oral before breakfast    MEDICATIONS  (PRN):  dextrose 40% Gel 15 Gram(s) Oral once PRN Blood Glucose LESS THAN 70 milliGRAM(s)/deciliter  glucagon  Injectable 1 milliGRAM(s) IntraMuscular once PRN Glucose LESS THAN 70 milligrams/deciliter      Allergies    No Known Allergies    Intolerances        REVIEW OF SYSTEMS:  CONSTITUTIONAL: No fever, weight loss, or fatigue  EYES: No eye pain, visual disturbances, or discharge  ENMT:  No difficulty hearing, tinnitus, vertigo; No sinus or throat pain  NECK: No pain or stiffness  RESPIRATORY: No cough, wheezing, chills or hemoptysis; No shortness of breath  CARDIOVASCULAR: No chest pain, palpitations, dizziness, or leg swelling  GASTROINTESTINAL: No abdominal or epigastric pain. No nausea, vomiting, or hematemesis; No diarrhea or constipation. No melena or hematochezia.  GENITOURINARY: No dysuria, frequency, hematuria, or incontinence  NEUROLOGICAL: No headaches, memory loss, loss of strength, numbness, or tremors, + difficulty speaking, tolerating soft diet  MUSCULOSKELETAL: No joint pain or swelling; No muscle, back, or extremity pain  PSYCHIATRIC: No depression, anxiety, mood swings, or difficulty sleeping      Vital Signs Last 24 Hrs  T(C): 36 (2019 05:57), Max: 37.1 (2019 00:47)  T(F): 96.8 (2019 05:57), Max: 98.8 (2019 00:47)  HR: 90 (2019 09:40) (58 - 90)  BP: 186/90 (2019 09:40) (126/71 - 195/90)  BP(mean): 96 (2019 23:00) (89 - 123)  RR: 17 (2019 05:57) (16 - 27)  SpO2: 98% (2019 09:40) (94% - 100%)    PHYSICAL EXAM:  GENERAL: NAD, well-groomed, well-developed, sitting in chair, dysarthric (but comprehensible speech),   HEAD:  Atraumatic, Normocephalic, Right facial droop  EYES: EOMI, PERRLA, conjunctiva and sclera clear  ENMT: No tonsillar erythema, exudates, or enlargement; Moist mucous membranes, Good dentition, No lesions  NERVOUS SYSTEM:  Alert & Oriented X3, Good concentration; Motor Strength grossly intact  CHEST/LUNG: Clear to percussion bilaterally; No rales, rhonchi, wheezing, or rubs  HEART: Regular rate and rhythm; No murmurs, rubs, or gallops  ABDOMEN: Soft, Nontender, Nondistended; Bowel sounds present  EXTREMITIES:  2+ Peripheral Pulses, No clubbing, cyanosis, or edema  LYMPH: No lymphadenopathy noted  SKIN: No rashes or lesions    LABS:                        16.2   4.81  )-----------( 161      ( 2019 05:44 )             47.7     06-07    138  |  102  |  13  ----------------------------<  121<H>  3.7   |  29  |  1.00    Ca    9.3      2019 05:44  Phos  3.0     06-07  Mg     2.5     06-07    TPro  8.0  /  Alb  4.3  /  TBili  2.3<H>  /  DBili  x   /  AST  21  /  ALT  31  /  AlkPhos  64  06-05    PT/INR - ( 2019 13:34 )   PT: 13.8 sec;   INR: 1.22 ratio         PTT - ( 2019 13:34 )  PTT:31.5 sec  Urinalysis Basic - ( 2019 14:13 )    Color: Yellow / Appearance: Clear / S.010 / pH: x  Gluc: x / Ketone: Moderate  / Bili: Negative / Urobili: Negative mg/dL   Blood: x / Protein: 30 mg/dL / Nitrite: Negative   Leuk Esterase: Negative / RBC: 0-2 /HPF / WBC x   Sq Epi: x / Non Sq Epi: Occasional / Bacteria: x      CAPILLARY BLOOD GLUCOSE      POCT Blood Glucose.: 200 mg/dL (2019 11:14)  POCT Blood Glucose.: 111 mg/dL (2019 07:28)  POCT Blood Glucose.: 112 mg/dL (2019 21:08)  POCT Blood Glucose.: 132 mg/dL (2019 17:53)  POCT Blood Glucose.: 124 mg/dL (2019 12:34)      RADIOLOGY & ADDITIONAL TESTS:  < from: TTE Echo Doppler w/o Cont (19 @ 07:23) >   EXAM:  TTE WO CON COMPLETE W DOPPL         PROCEDURE DATE:  2019      < end of copied text >  < from: TTE Echo Doppler w/o Cont (19 @ 07:23) >  Summary:   1. Left ventricular ejection fraction, by visual estimation, is 60 to   65%.   2. Technically fair study.   3. Normal global left ventricular systolic function.   4. Normal left ventricular internal cavity size.   5. Spectral Doppler shows impaired relaxation pattern of left   ventricular myocardial filling (Grade I diastolic dysfunction).   6. Normal right ventricular size and function.   7. There is no evidence of pericardial effusion.   8. Structurally normal mitral valve, with normal leaflet excursion.   9. Trace mitral valve regurgitation.    Shravan Haines MD FACC, FASE, FACP  Electronically signed on 2019 at 12:28:38 PM    < end of copied text >      Imaging Personally Reviewed:  [x ] YES  [ ] NO    Consultant(s) Notes Reviewed:  [x ] YES  [ ] NO    Care Discussed with Consultants/Other Providers [x ] YES  [ ] NO

## 2019-06-07 NOTE — DISCHARGE NOTE PROVIDER - HOSPITAL COURSE
Patient is a 64 yo M with a pmhx of HTN (poorly controlled despite reported compliance with bp meds) DM type 2who presents to the ED with right facial droop. Patients reports that in retrospect he felt off the evening before when visiting with his grandchildren. Patient then developed right. facial droop and dysarthriathe morning at 10am. Patient admitted to ICU for CVA s/p tPA treatment. Denies paresthesias weakness, dizziness, vision loss, LOC, chest pain, SOB, N/V/D, fever, and urinary symptoms.         In the ED patient was code stroke, CTA Brain neg for intracranial hemorrhage and involutional and ischemic gliotic changes. Patient received tPA and labetelol. BP of 235/108 was reduced to 156/75.     Pt. admitted to icu, no events on tele for 24 hours as per icu attending, Dr. Tafoya. Pt. evaluated by neuro, speech (will need speech therapy and dsyphagia diet). OT/PT recommend acute rehab,     Pt. can tolerate and benefit from acute rehab.    Tolerating slighlty high bp for perfusion s/p stroke , continue to monitor and increase or add other bp agents as needed.    Patient still has right facial droop and dysarthria, no improvement after tPA.        Social: Pt. is still working as a  and lives at home with his wife, no smoking or EtOH. Patient is a 62 yo M with a pmhx of HTN (poorly controlled despite reported compliance with bp meds) DM type 2who presents to the ED with right facial droop. Patients reports that in retrospect he felt off the evening before when visiting with his grandchildren. Patient then developed right. facial droop and dysarthriathe morning at 10am. Patient admitted to ICU for CVA s/p tPA treatment. Denies paresthesias weakness, dizziness, vision loss, LOC, chest pain, SOB, N/V/D, fever, and urinary symptoms.         In the ED patient was code stroke, CTA Brain neg for intracranial hemorrhage and involutional and ischemic gliotic changes. Patient received tPA and labetelol. BP of 235/108 was reduced to 156/75.     Pt. admitted to icu, no events on tele for 24 hours as per icu attending, Dr. Tafoya. Pt. evaluated by neuro, speech (will need speech therapy and dsyphagia diet). OT/PT recommend acute rehab,     Pt. can tolerate and benefit from acute rehab.    Tolerating slighlty high bp for perfusion s/p stroke , continue to monitor and increase or add other bp agents as needed.    Patient still has right facial droop and dysarthria, no improvement after tPA.        mri:    IMPRESSION:        1)  a periventricular and basal ganglia acute infarct is noted on the     left in the MCA territory. Otherwise there are very mild scattered     chronic ischemic changes with volume loss. No large vessel occlusion     suggested..    2)  scattered thickening noted in the sinuses without fluid levels..        carotid doppler        IMPRESSION:        No duplex evidence of hemodynamically significant internal carotid artery     stenosis.            Social: Pt. is still working as a  and lives at home with his wife, no smoking or EtOH. Patient is a 64 yo M with a pmhx of HTN (poorly controlled despite reported compliance with bp meds) DM type 2 who presents to the ED with right facial droop and difficulty speaking. Patients reports that in retrospect he felt off the evening before when visiting with his grandchildren. Patient then developed right. facial droop and dysarthria the next morning around 10am.  Denied paresthesias weakness, dizziness, vision loss, LOC, chest pain, SOB, N/V/D, fever, and urinary symptoms.         In the ED patient was code stroke, CTA Brain neg for intracranial hemorrhage and involutional and ischemic gliotic changes. Patient received tPA and labetelol. BP of 235/108 was reduced to 156/75.     Pt. admitted to icu, no events on tele for 24 hours as per icu attending, Dr. Tafoya. Pt. evaluated by neuro, speech (will need speech therapy and dsyphagia diet). OT/PT recommend acute rehab,     Pt. can tolerate and benefit from acute rehab.    Tolerating slighlty high bp for perfusion s/p stroke , continue to monitor and increase or add other bp agents as needed.    Patient still has right facial droop and dysarthria, no improvement after tPA.        mri:    IMPRESSION:        1)  a periventricular and basal ganglia acute infarct is noted on the     left in the MCA territory. Otherwise there are very mild scattered     chronic ischemic changes with volume loss. No large vessel occlusion     suggested..    2)  scattered thickening noted in the sinuses without fluid levels..        carotid doppler        IMPRESSION:        No duplex evidence of hemodynamically significant internal carotid artery     stenosis.        TTE    < from: TTE Echo Doppler w/o Cont (06.06.19 @ 07:23) >         EXAM:  TTE WO CON COMPLETE W DOPPL               PROCEDURE DATE:  06/06/2019          < end of copied text >        < from: TTE Echo Doppler w/o Cont (06.06.19 @ 07:23) >            Summary:     1. Left ventricular ejection fraction, by visual estimation, is 60 to     65%.     2. Technically fair study.     3. Normal global left ventricular systolic function.     4. Normal left ventricular internal cavity size.     5. Spectral Doppler shows impaired relaxation pattern of left     ventricular myocardial filling (Grade I diastolic dysfunction).     6. Normal right ventricular size and function.     7. There is no evidence of pericardial effusion.     8. Structurally normal mitral valve, with normal leaflet excursion.     9. Trace mitral valve regurgitation.        Shravan Haines MD FACC, FASE, FACP        < end of copied text >                Social: Pt. is still working as a  and lives at home with his wife, no smoking or EtOH. Patient is a 64 yo M with a pmhx of HTN (poorly controlled despite reported compliance with bp meds) DM type 2 who presents to the ED with right facial droop and difficulty speaking. Patients reports that in retrospect he felt off the evening before when visiting with his grandchildren. Patient then developed right. facial droop and dysarthria the next morning around 10am.  Denied paresthesias weakness, dizziness, vision loss, LOC, chest pain, SOB, N/V/D, fever, and urinary symptoms.         In the ED patient was code stroke, CTA Brain neg for intracranial hemorrhage and involutional and ischemic gliotic changes. Patient received tPA and labetelol. BP of 235/108 was reduced to 156/75.     Pt. admitted to icu, no events on tele for 24 hours as per icu attending, Dr. Tafoya. Pt. evaluated by neuro, speech (will need speech therapy and dsyphagia diet). OT/PT recommend acute rehab,     Pt. can tolerate and benefit from acute rehab.    Tolerating slighlty high bp for perfusion s/p stroke , continue to monitor and increase or add other bp agents as needed. Increased norvasc to 10mg    carotid doppler        IMPRESSION:        No duplex evidence of hemodynamically significant internal carotid artery     stenosis.        TTE    < from: TTE Echo Doppler w/o Cont (06.06.19 @ 07:23) >         EXAM:  TTE WO CON COMPLETE W DOPPL               PROCEDURE DATE:  06/06/2019          < end of copied text >        < from: TTE Echo Doppler w/o Cont (06.06.19 @ 07:23) >            Summary:     1. Left ventricular ejection fraction, by visual estimation, is 60 to     65%.     2. Technically fair study.     3. Normal global left ventricular systolic function.     4. Normal left ventricular internal cavity size.     5. Spectral Doppler shows impaired relaxation pattern of left     ventricular myocardial filling (Grade I diastolic dysfunction).     6. Normal right ventricular size and function.     7. There is no evidence of pericardial effusion.     8. Structurally normal mitral valve, with normal leaflet excursion.     9. Trace mitral valve regurgitation.        Shravan Haines MD FACC, FASE, FACP        < end of copied text >                Social: Pt. is still working as a  and lives at home with his wife, no smoking or EtOH. Patient is a 64 yo M with a pmhx of HTN (poorly controlled despite reported compliance with bp meds) DM type 2 who presents to the ED with right facial droop and difficulty speaking. Patients reports that in retrospect he felt off the evening before when visiting with his grandchildren. Patient then developed right. facial droop and dysarthria the next morning around 10am.  Denied paresthesias weakness, dizziness, vision loss, LOC, chest pain, SOB, N/V/D, fever, and urinary symptoms.         In the ED patient was code stroke, CTA Brain neg for intracranial hemorrhage and involutional and ischemic gliotic changes. Patient received tPA and labetelol. BP of 235/108 was reduced to 156/75.     Pt. admitted to icu, no events on tele for 24 hours as per icu attending, Dr. Tafoya. Pt. evaluated by neuro, speech (will need speech therapy and dsyphagia diet). OT/PT recommend acute rehab,     Pt. can tolerate and benefit from acute rehab.    Tolerating slighlty high bp for perfusion s/p stroke , continue to monitor and increase or add other bp agents as needed. Increased norvasc to 10mg    carotid doppler        IMPRESSION:        No duplex evidence of hemodynamically significant internal carotid artery     stenosis.        TTE    < from: TTE Echo Doppler w/o Cont (06.06.19 @ 07:23) >         EXAM:  TTE WO CON COMPLETE W DOPPL               PROCEDURE DATE:  06/06/2019          < end of copied text >        < from: TTE Echo Doppler w/o Cont (06.06.19 @ 07:23) >            Summary:     1. Left ventricular ejection fraction, by visual estimation, is 60 to     65%.     2. Technically fair study.     3. Normal global left ventricular systolic function.     4. Normal left ventricular internal cavity size.     5. Spectral Doppler shows impaired relaxation pattern of left     ventricular myocardial filling (Grade I diastolic dysfunction).     6. Normal right ventricular size and function.     7. There is no evidence of pericardial effusion.     8. Structurally normal mitral valve, with normal leaflet excursion.     9. Trace mitral valve regurgitation.        Shravan Haines MD FACC, FASE, FACP        < end of copied text >                Social: Pt. is still working as a  and lives at home with his wife, no smoking or EtOH.     Plan is for Acute rehab

## 2019-06-07 NOTE — OCCUPATIONAL THERAPY INITIAL EVALUATION ADULT - RANGE OF MOTION EXAMINATION, UPPER EXTREMITY
Left UE Active ROM was WFL (within functional limits)/Pt has mild decreased concentric & eccentric control in RUE/Right UE Passive ROM was WFL  (within functional limits)/Right UE Active Assistive ROM was WFL  (within functional limits)/Left UE Passive ROM was WFL  (within functional limits)

## 2019-06-07 NOTE — OCCUPATIONAL THERAPY INITIAL EVALUATION ADULT - LEVEL OF CONSCIOUSNESS, OT EVAL
alert/Pt is functioning at level4 on the [facility] Cognitive Continuum. Pt. can integrate multiple pieces of information for a task but tends to be concrete and have difficulty sequencing the task. Pt. can begin simple problem solving.

## 2019-06-07 NOTE — PROGRESS NOTE ADULT - ASSESSMENT
Patient is a 62 yo M with a pmhx of HTN (poorly controlled despite reported compliance with bp meds) DM type 2 who presents to the ED with right facial droop and difficulty speaking. Patients reports that, in retrospect he felt off the evening before, when visiting with his grandchildren. Patient then developed right facial droop and dysarthria in the morning at 10am. s/p tPA and ICU observation, no events on tele as per ICU attending, transferred to medical floor, still with facial droop and dysarthria

## 2019-06-07 NOTE — OCCUPATIONAL THERAPY INITIAL EVALUATION ADULT - BALANCE DISTURBANCE, IDENTIFIED IMPAIRMENT CONTRIBUTE, REHAB EVAL
impaired coordination/impaired motor control/impaired postural control/decreased ROM/decreased sensation/impaired sensory feedback/abnormal muscle tone/decreased strength

## 2019-06-07 NOTE — DISCHARGE NOTE PROVIDER - CARE PROVIDER_API CALL
Russ Kyle)  Neurology  [address] [place] [address], [place] [address]  Phone: [phone]  Fax: [phone]  Follow Up Time:     Dr. Nikole  PCP  Phone: (   )    -  Fax: (   )    -  Follow Up Time:

## 2019-06-07 NOTE — OCCUPATIONAL THERAPY INITIAL EVALUATION ADULT - IMPAIRED TRANSFERS: BED/CHAIR, REHAB EVAL
impaired postural control/impaired motor control/decreased ROM/cognition/impaired coordination/impaired balance/ataxic/abnormal muscle tone/narrow base of support/pain/decreased strength/decreased flexibility

## 2019-06-07 NOTE — OCCUPATIONAL THERAPY INITIAL EVALUATION ADULT - PLANNED THERAPY INTERVENTIONS, OT EVAL
balance training/energy conservation techniques/stretching/transfer training/ADL retraining/bed mobility training/motor coordination training/ROM/IADL retraining/cognitive, visual perceptual/fine motor coordination training/neuromuscular re-education/strengthening

## 2019-06-07 NOTE — OCCUPATIONAL THERAPY INITIAL EVALUATION ADULT - LIVES WITH, PROFILE
in a private house  with 16 steps to enter with hand rails Once inside, pt has to negotiate a flight of stairs  with 15 steps and hand rails, to access the bedroom and bathroom. The bathroom has a tub/shower combination and standard toilet./spouse in a private house with 16 steps to enter with hand rails. Once inside, pt has to negotiate a flight of stairs with 15 steps and hand rails, to access the bedroom and bathroom. The bathroom has a tub/shower combination and standard toilet./spouse

## 2019-06-07 NOTE — OCCUPATIONAL THERAPY INITIAL EVALUATION ADULT - IMPAIRMENTS CONTRIBUTING IMPAIRED BED MOBILITY, REHAB EVAL
decreased ROM/impaired postural control/decreased flexibility/impaired motor control/abnormal muscle tone/impaired balance/narrow base of support/ataxic/decreased sensation/impaired sensory feedback/decreased strength

## 2019-06-07 NOTE — OCCUPATIONAL THERAPY INITIAL EVALUATION ADULT - ADDITIONAL COMMENTS
Prior to admission, pt was functioning in his roles, self sufficient & ambulating independently in the community without any assistive devices.  Presently pt needs assistance with self care tasks and functional mobility due to right  side weakness from CVA. Pt is able to make needs & wants known, reach out of base of support in sitting without loss of balance but unable to do it in standing. Dexterity and fine motor skills , tendon reflexes and movements velocity are diminished in RUE/LE. Pt crosses mid line, uses right hand as a functional assist/ stabilizer and maintain gross grasp on walker without hand over hand assistance. Prior to admission, pt was functioning in his roles, self sufficient & ambulating independently in the community without any assistive devices.  Presently pt needs assistance with self care tasks and functional mobility due to right side weakness from CVA. Pt is able to make needs & wants known, reach out of base of support in sitting without loss of balance but unable to do it in standing. Dexterity and fine motor skills , tendon reflexes and movements velocity are diminished in RUE/LE. Pt crosses mid line, uses right hand as a functional assist/ stabilizer and maintain gross grasp on walker without hand over hand assistance.

## 2019-06-07 NOTE — DISCHARGE NOTE PROVIDER - PROVIDER TOKENS
PROVIDER:[TOKEN:[[id number]]],FREE:[LAST:[Nikole],FIRST:[],PHONE:[(   )    -],FAX:[(   )    -],ADDRESS:[PCP]]

## 2019-06-07 NOTE — OCCUPATIONAL THERAPY INITIAL EVALUATION ADULT - TRANSFER SAFETY CONCERNS NOTED: BED/CHAIR, REHAB EVAL
decreased safety awareness/decreased sequencing ability/decreased weight-shifting ability/losing balance/squat pivot/stand pivot/decreased balance during turns/decreased proprioception/decreased step length

## 2019-06-07 NOTE — PROGRESS NOTE ADULT - SUBJECTIVE AND OBJECTIVE BOX
Subjective Complaints:  Historian:    Patient      REVIEW OF SYSTEMS:  Eyes:  Good vision, no reported pain  ENT:  No sore throat, pain, runny nose, dysphagia  CV:  No pain, palpitatioins, hypo/hypertension  Resp:  No dyspnea, cough, tachypnea, wheezing  GI:  No pain, nausea, vomiting, diarrhea, constipatiion  Muscle:  No pain, weakness  Neuro:  No weakness, tingling, memory problems  Psych:  No fatigue, insomnia, mood problems, depression  Endocrine:  No polyuria, polydypsia, cold/heat intolerance    Vital Signs Last 24 Hrs  T(C): 36 (2019 05:57), Max: 37.1 (2019 00:47)  T(F): 96.8 (2019 05:57), Max: 98.8 (2019 00:47)  HR: 90 (2019 09:40) (58 - 90)  BP: 186/90 (2019 09:40) (126/71 - 195/90)  BP(mean): 96 (2019 23:00) (89 - 123)  RR: 17 (2019 05:57) (16 - 27)  SpO2: 98% (2019 09:40) (94% - 100%)    GENERAL PHYSICAL EXAM:  General:  Appears stated age, well-groomed, well-nourished, no distress  HEENT:  NC/AT, patent nares w/ pink mucosa, OP clear w/o lesions, PERRL, EOMI, conjunctivae clear, no thyromegaly, nodules, adenopathy, no JVD  Chest:  Full & symmetric excursion, no increased effort, breath sounds clear  Cardiovascular:  Regular rhythm, S1, S2, no murmur/rub/S3/S4, no carotid/femoral/abdominal bruit, radial/pedal pulses 2+, no edema  Abdomen:  Soft, non-tender, non-distended, normoactive bowel sounds, no HSM  Extremities:  Gait & station:   Digits:   Nails:   Joints, Bones, Muscles:   ROM:   Stability:  Skin:  No rash/erythema/ecchymoses/petechiae/wounds/abscess/warm/dry  Musculoskeletal:  Full ROM in all joints w/o swelling/tenderness/effusion    NEUROLOGICAL EXAM:  HENT:  Normocephalic head; atraumatic head.  Neck supple.  ENT: normal looking.  Mental State:    Alert.  Fully oriented to person, place and date.  Coherent.  Speech clear and intact.  Cooperative.  Responds appropriately.    Cranial Nerves:  II-XII:   Pupils round and reactive to light and accommodation.  Extraocular movements full.  Visual fields full (no homonymous hemianopsia).  Visual acuity wnl.Right   Facial weakness  Tongue midline.  Motor Functions:  Moves all extremities.  No pronator drift of UE.  Claps hand well.  Hand  intact bilaterally.  Ambulatory.    Sensory Functions:   Intact to touch and pinprick to face and extremities.    Reflexes:  Deep tendon reflexes normoactive to biceps, knees and ankles.  Babinski absent (present).  Cerebellar Testing:    Finger to nose intact.  Nystagmus absent.  Neurovascular: Carotid auscultation full without bruits.      LABS:                        16.2   4.81  )-----------( 161      ( 2019 05:44 )             47.7     06-07    138  |  102  |  13  ----------------------------<  121<H>  3.7   |  29  |  1.00    Ca    9.3      2019 05:44  Phos  3.0     06-07  Mg     2.5     06-07    TPro  8.0  /  Alb  4.3  /  TBili  2.3<H>  /  DBili  x   /  AST  21  /  ALT  31  /  AlkPhos  64  06-05    PT/INR - ( 2019 13:34 )   PT: 13.8 sec;   INR: 1.22 ratio         PTT - ( 2019 13:34 )  PTT:31.5 sec    Urinalysis Basic - ( 2019 14:13 )    Color: Yellow / Appearance: Clear / S.010 / pH: x  Gluc: x / Ketone: Moderate  / Bili: Negative / Urobili: Negative mg/dL   Blood: x / Protein: 30 mg/dL / Nitrite: Negative   Leuk Esterase: Negative / RBC: 0-2 /HPF / WBC x   Sq Epi: x / Non Sq Epi: Occasional / Bacteria: x        RADIOLOGY & ADDITIONAL STUDIES:    Basic Metabolic Panel: AM Sched. Collection: 2019 04:00 ( @ 12:31)  Magnesium, Serum: AM Sched. Collection: 2019 04:00 ( @ 12:31)  Phosphorus Level, Serum: AM Sched. Collection: 2019 04:00 ( @ 12:31)  Complete Blood Count: AM Sched. Collection: 2019 04:00 ( @ 12:31)  Consult- Speech Bedside Swallow Evaluation:   *Reason for Consult - Must select at least one choice*     NPO Until Further Recommendations Made  Additional Information: sp CVA ( @ 12:31)  Diet, Dysphagia 1 Pureed-Thin Liquids:   Consistent Carbohydrate Evening Snack  Low Sodium ( @ 12:35)  POCT  Blood Glucose: 12:34 ( @ 12:35)  Diet, Dysphagia 2 Mechanical Soft-Thin Liquids:   Consistent Carbohydrate Evening Snack  Low Sodium ( @ 13:58)  Transfer in Level of Care and or Service:     Transfer to Service: Medical/Surgical    Additional Instructions:  d/w Dr. Tafoya ( @ 15:32)  Transfer in Physician:     Transfer to Service: Medical/Surgical    Transfer to Physician: Patrick ( @ 15:32)  Vital Signs:     Frequency:  every 4 hours ( @ 15:32)  aspirin:   325 milliGRAM(s), Oral, daily  Provider's Contact #: [phone] ( @ 15:32)  atorvastatin: [Ordered as LIPITOR]  40 milliGRAM(s), Oral, at bedtime  Provider's Contact #: [phone] ( @ 15:32)  amLODIPine   Tablet: [Known as NORVASC]  5 milliGRAM(s), Oral, daily  Special Instructions: hold for sbp < 150  Administration Instructions: This is a Look-alike/Sound-alike Medication  Provider's Contact #: [phone] ( @ 15:52)  losartan: [Known as COZAAR]  100 milliGRAM(s), Oral, daily  Special Instructions: hold for sbp < 140  Provider's Contact #: [phone] ( @ 16:05)  Blood Glucose Point Of Care Testing:     Frequency:  Before meals and at bedtime    Additional Instructions:  Before meals and at bedtime ( @ 16:24)  Blood Glucose Point Of Care Testing:     Frequency:  every 15 minutes    Additional Instructions:  After carbohydrate administration for hypoglycemia, repeat every 15 minute(s) until blood glucose is GREATER THAN or EQUAL  milliGRAM(s)/deciLiter twice consecutively ( @ 16:24)  Notify Provider For:     Additional Instructions:  Blood glucose LESS THAN 70 milliGRAM(s)/deciLiter or GREATER THAN 400 milliGRAM(s)/deciLiter ( @ 16:24)  Education:     Diabetes    Other: Diet, Exercise    Additional Instructions: Diet, Exercise, Diabetes ( @ 16:24)  insulin lispro (HumaLOG) corrective regimen sliding scale:       0 Unit(s) if Glucose 61 - 250      2 Unit(s) if Glucose 251 - 300      4 Unit(s) if Glucose 301 - 350      6 Unit(s) if Glucose 351 - 400      8 Unit(s) if Glucose Greater Than 400 + Contact MD  SubCutaneous, at bedtime  Special Instructions: Give correctional scale insulin REGARDLESS of PO status NOTIFY Provider for blood glucose LESS THAN 70 milliGRAM(s)/deciLiter or above 400 milliGRAM(s)/deciLiter.  Administration Instructions: *Per Sliding Scale*  Dispose unused medication in BLACK bin.  This is a Look-alike/Sound-alike Medication  Provider's Contact #: [phone] ( @ 16:24)  Administer Carbohydrates:     Additional Instructions:  STAT RESPONSIVE patient and Blood Glucose is LESS THAN 70 milliGRAM(s)/deciLiter: Administer 15 grams of fast acting carbohydrate [e.g., Give 4 ounces of APPLE Juice OR 6 ounces of NON-DIET soda OR 1 tube (15 grams) oral glucose gel (available in Automated Dispensing Machine)] and recheck capillary blood glucose in 15 minutes.  Repeat treatment and recheck glucose every 15 minutes until Blood Glucose is GREATER THAN or EQUAL  milliGRAM(s)/deciLiter and NOTIFY Provider.  HYPOGLYCEMIA PROTOCOL ( @ 16:24)  Provider to RN:       UNRESPONSIVE patient and Blood Glucose LESS THAN 70 milliGRAM(s)/deciLiter call Rapid Response.  HYPOGLYCEMIA PROTOCOL ( @ 16:24)  insulin lispro (HumaLOG) corrective regimen sliding scale:       1 Unit(s) if Glucose 151 - 200      2 Unit(s) if Glucose 201 - 250      3 Unit(s) if Glucose 251 - 300      4 Unit(s) if Glucose 301 - 350      5 Unit(s) if Glucose 351 - 400      6 Unit(s) if Glucose Greater Than 400 + Contact MD  SubCutaneous, three times a day before meals  Special Instructions: Give correctional scale insulin REGARDLESS of PO status NOTIFY Provider for blood glucose LESS THAN 70 milliGRAM(s)/deciLiter or above 400 milliGRAM(s)/deciLiter.  Administration Instructions: *Per Sliding Scale*  Dispose unused medication in BLACK bin.  This is a Look-alike/Sound-alike Medication  Provider's Contact #: [phone] ( @ 16:24)  pantoprazole    Tablet: [Ordered as PROTONIX   DR]  40 milliGRAM(s), Oral, before breakfast  Special Instructions: first dose now  Administration Instructions: swallow whole * don't crush/chew  Provider's Contact #: [phone] ( @ 16:26)  POCT  Blood Glucose: 17:53 ( @ 17:54)  Chart Check ( @ 19:15)  POCT  Blood Glucose: 21:08 ( @ 21:12)  POCT  Blood Glucose: 07:28 ( @ 07:30)  POCT  Blood Glucose: 11:14 ( @ 11:30)  CVA     Recommendations:  Brain MRI.  Carotid doppler.  Echocardiogram.  EEG.   DVT prophylaxis as ordered.  Medications:  ASA, STATIN

## 2019-06-07 NOTE — DISCHARGE NOTE PROVIDER - NSDCCPCAREPLAN_GEN_ALL_CORE_FT
PRINCIPAL DISCHARGE DIAGNOSIS  Diagnosis: Acute CVA (cerebrovascular accident)  Assessment and Plan of Treatment: likely ischemic, continue with aspirin, statin, rehab, control of HTN and DM      SECONDARY DISCHARGE DIAGNOSES  Diagnosis: Dysphagia  Assessment and Plan of Treatment: dysphagia diet, monitor for improvement    Diagnosis: Dysarthria due to acute stroke  Assessment and Plan of Treatment: speech therapy    Diagnosis: Type 2 diabetes mellitus  Assessment and Plan of Treatment: well controlled, continue with home regimen    Diagnosis: Hypertension  Assessment and Plan of Treatment: continue valsartan, amlodipine (increase to 10mg if needed), add back in BB , bystolic, close follow-up with PMD PRINCIPAL DISCHARGE DIAGNOSIS  Diagnosis: Acute CVA (cerebrovascular accident)  Assessment and Plan of Treatment: likely ischemic, continue with aspirin, statin, rehab, control of HTN and DM      SECONDARY DISCHARGE DIAGNOSES  Diagnosis: Chronic diastolic heart failure  Assessment and Plan of Treatment: no signs of decompensation, as per echo,, follow as outpatient, continue with current cardiac medications    Diagnosis: Dysphagia  Assessment and Plan of Treatment: dysphagia diet, monitor for improvement    Diagnosis: Dysarthria due to acute stroke  Assessment and Plan of Treatment: speech therapy    Diagnosis: Type 2 diabetes mellitus  Assessment and Plan of Treatment: well controlled, continue with home regimen    Diagnosis: Hypertension  Assessment and Plan of Treatment: continue valsartan, amlodipine (increase to 10mg if needed), add back in BB , bystolic, close follow-up with PMD

## 2019-06-07 NOTE — OCCUPATIONAL THERAPY INITIAL EVALUATION ADULT - GENERAL OBSERVATIONS, REHAB EVAL
Pt was seen for initial OT consult, encountered OOB to chair in NAD condom cath in place; pt was AA&Ox4, cooperative & followed commands. Pt has facial weakness and speech is dysarthic due to CVA. Pt denied pain numbness and tingling. Pt presents with right side weakness, decreased activity tolerance coordination, ROM balance, depth perception, spatial relation, ADL management and functional mobility. Pt was seen for initial OT consult, encountered OOB to chair in NAD condom cath in place; pt was AA&Ox4, cooperative & followed commands. Pt has facial weakness and speech is dysarthric due to CVA. Pt denied pain, numbness and tingling. Pt presents with right side weakness, decreased activity tolerance coordination, ROM balance, depth perception, spatial relation, ADL management and functional mobility.

## 2019-06-07 NOTE — OCCUPATIONAL THERAPY INITIAL EVALUATION ADULT - PERSONAL SAFETY AND JUDGMENT, REHAB EVAL
Pt needs verbal cues for proper hand placement and to safely maneuver walker./at risk behaviors demonstrated

## 2019-06-07 NOTE — OCCUPATIONAL THERAPY INITIAL EVALUATION ADULT - RANGE OF MOTION EXAMINATION, LOWER EXTREMITY
Left LE Passive ROM was WNL (within normal limits)/Left LE Active ROM was WNL  (within normal limits)/Right LE Active Assistive ROM was WFL  (within functional limits)/Right LE Passive ROM was WFL  (within functional limits)

## 2019-06-07 NOTE — OCCUPATIONAL THERAPY INITIAL EVALUATION ADULT - PERTINENT HX OF CURRENT PROBLEM, REHAB EVAL
Pt presented to ER on due to acute onset of neurological deficits. Pt is diagnosed with CVA and was administered TPA. MRI on 6/6/19 results confirm periventricular and basal ganglia acute infarct is noted on the left in the MCA territory

## 2019-06-08 LAB
GLUCOSE BLDC GLUCOMTR-MCNC: 121 MG/DL — HIGH (ref 70–99)
GLUCOSE BLDC GLUCOMTR-MCNC: 131 MG/DL — HIGH (ref 70–99)
GLUCOSE BLDC GLUCOMTR-MCNC: 158 MG/DL — HIGH (ref 70–99)
GLUCOSE BLDC GLUCOMTR-MCNC: 177 MG/DL — HIGH (ref 70–99)

## 2019-06-08 PROCEDURE — 99233 SBSQ HOSP IP/OBS HIGH 50: CPT

## 2019-06-08 RX ORDER — SENNA PLUS 8.6 MG/1
2 TABLET ORAL AT BEDTIME
Refills: 0 | Status: DISCONTINUED | OUTPATIENT
Start: 2019-06-08 | End: 2019-06-11

## 2019-06-08 RX ORDER — DOCUSATE SODIUM 100 MG
100 CAPSULE ORAL THREE TIMES A DAY
Refills: 0 | Status: DISCONTINUED | OUTPATIENT
Start: 2019-06-08 | End: 2019-06-11

## 2019-06-08 RX ORDER — POLYETHYLENE GLYCOL 3350 17 G/17G
17 POWDER, FOR SOLUTION ORAL DAILY
Refills: 0 | Status: DISCONTINUED | OUTPATIENT
Start: 2019-06-08 | End: 2019-06-11

## 2019-06-08 RX ADMIN — PANTOPRAZOLE SODIUM 40 MILLIGRAM(S): 20 TABLET, DELAYED RELEASE ORAL at 08:10

## 2019-06-08 RX ADMIN — Medication 1: at 11:35

## 2019-06-08 RX ADMIN — POLYETHYLENE GLYCOL 3350 17 GRAM(S): 17 POWDER, FOR SOLUTION ORAL at 12:46

## 2019-06-08 RX ADMIN — Medication 100 MILLIGRAM(S): at 14:48

## 2019-06-08 RX ADMIN — LOSARTAN POTASSIUM 100 MILLIGRAM(S): 100 TABLET, FILM COATED ORAL at 05:38

## 2019-06-08 RX ADMIN — Medication 100 MILLIGRAM(S): at 22:03

## 2019-06-08 RX ADMIN — AMLODIPINE BESYLATE 5 MILLIGRAM(S): 2.5 TABLET ORAL at 05:38

## 2019-06-08 RX ADMIN — SENNA PLUS 2 TABLET(S): 8.6 TABLET ORAL at 22:03

## 2019-06-08 RX ADMIN — ATORVASTATIN CALCIUM 40 MILLIGRAM(S): 80 TABLET, FILM COATED ORAL at 22:03

## 2019-06-08 RX ADMIN — Medication 325 MILLIGRAM(S): at 11:35

## 2019-06-09 DIAGNOSIS — K59.01 SLOW TRANSIT CONSTIPATION: ICD-10-CM

## 2019-06-09 LAB
GLUCOSE BLDC GLUCOMTR-MCNC: 137 MG/DL — HIGH (ref 70–99)
GLUCOSE BLDC GLUCOMTR-MCNC: 139 MG/DL — HIGH (ref 70–99)
GLUCOSE BLDC GLUCOMTR-MCNC: 141 MG/DL — HIGH (ref 70–99)
GLUCOSE BLDC GLUCOMTR-MCNC: 168 MG/DL — HIGH (ref 70–99)

## 2019-06-09 PROCEDURE — 99233 SBSQ HOSP IP/OBS HIGH 50: CPT

## 2019-06-09 RX ORDER — LACTULOSE 10 G/15ML
10 SOLUTION ORAL ONCE
Refills: 0 | Status: COMPLETED | OUTPATIENT
Start: 2019-06-09 | End: 2019-06-09

## 2019-06-09 RX ADMIN — PANTOPRAZOLE SODIUM 40 MILLIGRAM(S): 20 TABLET, DELAYED RELEASE ORAL at 08:21

## 2019-06-09 RX ADMIN — LACTULOSE 10 GRAM(S): 10 SOLUTION ORAL at 11:37

## 2019-06-09 RX ADMIN — LOSARTAN POTASSIUM 100 MILLIGRAM(S): 100 TABLET, FILM COATED ORAL at 05:18

## 2019-06-09 RX ADMIN — Medication 100 MILLIGRAM(S): at 05:18

## 2019-06-09 RX ADMIN — AMLODIPINE BESYLATE 5 MILLIGRAM(S): 2.5 TABLET ORAL at 05:18

## 2019-06-09 RX ADMIN — Medication 325 MILLIGRAM(S): at 11:40

## 2019-06-09 RX ADMIN — POLYETHYLENE GLYCOL 3350 17 GRAM(S): 17 POWDER, FOR SOLUTION ORAL at 11:39

## 2019-06-09 RX ADMIN — Medication 100 MILLIGRAM(S): at 13:45

## 2019-06-09 RX ADMIN — Medication 10 MILLIGRAM(S): at 22:21

## 2019-06-09 RX ADMIN — Medication 100 MILLIGRAM(S): at 22:21

## 2019-06-09 RX ADMIN — Medication 1: at 11:36

## 2019-06-09 RX ADMIN — ATORVASTATIN CALCIUM 40 MILLIGRAM(S): 80 TABLET, FILM COATED ORAL at 22:22

## 2019-06-09 RX ADMIN — SENNA PLUS 2 TABLET(S): 8.6 TABLET ORAL at 22:21

## 2019-06-09 NOTE — PROGRESS NOTE ADULT - SUBJECTIVE AND OBJECTIVE BOX
Patient is a 63y old  Male who presents with a chief complaint of CVA on tPA (07 Jun 2019 10:27)      INTERVAL HPI/OVERNIGHT EVENTS:  pt seen and examined, reports constipation, other wise feels ok, tolerating dysphagia diet.    T(C): 36.6 (06-09-19 @ 13:02), Max: 36.6 (06-09-19 @ 13:02)  HR: 85 (06-09-19 @ 14:18) (64 - 85)  BP: 190/90 (06-09-19 @ 14:18) (171/89 - 190/90)  RR: 17 (06-09-19 @ 13:02) (17 - 18)  SpO2: 98% (06-09-19 @ 14:18) (97% - 98%)    MEDICATIONS  (STANDING):  amLODIPine   Tablet 5 milliGRAM(s) Oral daily  aspirin 325 milliGRAM(s) Oral daily  atorvastatin 40 milliGRAM(s) Oral at bedtime  dextrose 5%. 1000 milliLiter(s) (50 mL/Hr) IV Continuous <Continuous>  dextrose 50% Injectable 12.5 Gram(s) IV Push once  dextrose 50% Injectable 25 Gram(s) IV Push once  dextrose 50% Injectable 25 Gram(s) IV Push once  docusate sodium 100 milliGRAM(s) Oral three times a day  insulin lispro (HumaLOG) corrective regimen sliding scale   SubCutaneous at bedtime  insulin lispro (HumaLOG) corrective regimen sliding scale   SubCutaneous three times a day before meals  losartan 100 milliGRAM(s) Oral daily  pantoprazole    Tablet 40 milliGRAM(s) Oral before breakfast  polyethylene glycol 3350 17 Gram(s) Oral daily  senna 2 Tablet(s) Oral at bedtime    MEDICATIONS  (PRN):  dextrose 40% Gel 15 Gram(s) Oral once PRN Blood Glucose LESS THAN 70 milliGRAM(s)/deciliter  glucagon  Injectable 1 milliGRAM(s) IntraMuscular once PRN Glucose LESS THAN 70 milligrams/deciliter    Allergies    No Known Allergies    Intolerances        Vital Signs Last 24 Hrs    PHYSICAL EXAM:  GENERAL: NAD, well-groomed, well-developed, sitting in chair, dysarthric (but comprehensible speech),   HEAD:  Atraumatic, Normocephalic, Right facial droop  EYES: EOMI, conjunctiva and sclera clear  NERVOUS SYSTEM:  Alert & Oriented X3, Good concentration; Motor Strength grossly intact  CHEST/LUNG: Clear to percussion bilaterally; No rales, rhonchi, wheezing, or rubs  HEART: Regular rate and rhythm; No murmurs, rubs, or gallops  ABDOMEN: Soft, Nontender, Nondistended; Bowel sounds present  EXTREMITIES:  2+ Peripheral Pulses, No clubbing, cyanosis, or edema  LYMPH: No lymphadenopathy noted  SKIN: No rashes or lesions                 Imaging Personally Reviewed:  [x ] YES  [ ] NO    Consultant(s) Notes Reviewed:  [x ] YES  [ ] NO    Care Discussed with Consultants/Other Providers [x ] YES  [ ] NO

## 2019-06-10 LAB
ANION GAP SERPL CALC-SCNC: 7 MMOL/L — SIGNIFICANT CHANGE UP (ref 5–17)
BASOPHILS # BLD AUTO: 0.03 K/UL — SIGNIFICANT CHANGE UP (ref 0–0.2)
BASOPHILS NFR BLD AUTO: 0.6 % — SIGNIFICANT CHANGE UP (ref 0–2)
BUN SERPL-MCNC: 18 MG/DL — SIGNIFICANT CHANGE UP (ref 7–23)
CALCIUM SERPL-MCNC: 9.1 MG/DL — SIGNIFICANT CHANGE UP (ref 8.5–10.1)
CHLORIDE SERPL-SCNC: 103 MMOL/L — SIGNIFICANT CHANGE UP (ref 96–108)
CO2 SERPL-SCNC: 28 MMOL/L — SIGNIFICANT CHANGE UP (ref 22–31)
CREAT SERPL-MCNC: 1 MG/DL — SIGNIFICANT CHANGE UP (ref 0.5–1.3)
EOSINOPHIL # BLD AUTO: 0.19 K/UL — SIGNIFICANT CHANGE UP (ref 0–0.5)
EOSINOPHIL NFR BLD AUTO: 3.8 % — SIGNIFICANT CHANGE UP (ref 0–6)
GLUCOSE BLDC GLUCOMTR-MCNC: 123 MG/DL — HIGH (ref 70–99)
GLUCOSE BLDC GLUCOMTR-MCNC: 158 MG/DL — HIGH (ref 70–99)
GLUCOSE SERPL-MCNC: 131 MG/DL — HIGH (ref 70–99)
HCT VFR BLD CALC: 40.7 % — SIGNIFICANT CHANGE UP (ref 39–50)
HGB BLD-MCNC: 14 G/DL — SIGNIFICANT CHANGE UP (ref 13–17)
IMM GRANULOCYTES NFR BLD AUTO: 0.4 % — SIGNIFICANT CHANGE UP (ref 0–1.5)
LYMPHOCYTES # BLD AUTO: 1.38 K/UL — SIGNIFICANT CHANGE UP (ref 1–3.3)
LYMPHOCYTES # BLD AUTO: 27.6 % — SIGNIFICANT CHANGE UP (ref 13–44)
MCHC RBC-ENTMCNC: 29.9 PG — SIGNIFICANT CHANGE UP (ref 27–34)
MCHC RBC-ENTMCNC: 34.4 GM/DL — SIGNIFICANT CHANGE UP (ref 32–36)
MCV RBC AUTO: 86.8 FL — SIGNIFICANT CHANGE UP (ref 80–100)
MONOCYTES # BLD AUTO: 0.41 K/UL — SIGNIFICANT CHANGE UP (ref 0–0.9)
MONOCYTES NFR BLD AUTO: 8.2 % — SIGNIFICANT CHANGE UP (ref 2–14)
NEUTROPHILS # BLD AUTO: 2.97 K/UL — SIGNIFICANT CHANGE UP (ref 1.8–7.4)
NEUTROPHILS NFR BLD AUTO: 59.4 % — SIGNIFICANT CHANGE UP (ref 43–77)
NRBC # BLD: 0 /100 WBCS — SIGNIFICANT CHANGE UP (ref 0–0)
PLATELET # BLD AUTO: 161 K/UL — SIGNIFICANT CHANGE UP (ref 150–400)
POTASSIUM SERPL-MCNC: 3.6 MMOL/L — SIGNIFICANT CHANGE UP (ref 3.5–5.3)
POTASSIUM SERPL-SCNC: 3.6 MMOL/L — SIGNIFICANT CHANGE UP (ref 3.5–5.3)
RBC # BLD: 4.69 M/UL — SIGNIFICANT CHANGE UP (ref 4.2–5.8)
RBC # FLD: 13.1 % — SIGNIFICANT CHANGE UP (ref 10.3–14.5)
SODIUM SERPL-SCNC: 138 MMOL/L — SIGNIFICANT CHANGE UP (ref 135–145)
WBC # BLD: 5 K/UL — SIGNIFICANT CHANGE UP (ref 3.8–10.5)
WBC # FLD AUTO: 5 K/UL — SIGNIFICANT CHANGE UP (ref 3.8–10.5)

## 2019-06-10 PROCEDURE — 99233 SBSQ HOSP IP/OBS HIGH 50: CPT

## 2019-06-10 RX ORDER — DOCUSATE SODIUM 100 MG
1 CAPSULE ORAL
Qty: 0 | Refills: 0 | DISCHARGE
Start: 2019-06-10

## 2019-06-10 RX ORDER — AMLODIPINE BESYLATE 2.5 MG/1
1 TABLET ORAL
Qty: 0 | Refills: 0 | DISCHARGE

## 2019-06-10 RX ORDER — POLYETHYLENE GLYCOL 3350 17 G/17G
17 POWDER, FOR SOLUTION ORAL
Qty: 0 | Refills: 0 | DISCHARGE
Start: 2019-06-10

## 2019-06-10 RX ORDER — AMLODIPINE BESYLATE 2.5 MG/1
1 TABLET ORAL
Qty: 0 | Refills: 0 | DISCHARGE
Start: 2019-06-10

## 2019-06-10 RX ORDER — AMLODIPINE BESYLATE 2.5 MG/1
5 TABLET ORAL ONCE
Refills: 0 | Status: COMPLETED | OUTPATIENT
Start: 2019-06-10 | End: 2019-06-10

## 2019-06-10 RX ORDER — AMLODIPINE BESYLATE 2.5 MG/1
10 TABLET ORAL DAILY
Refills: 0 | Status: DISCONTINUED | OUTPATIENT
Start: 2019-06-11 | End: 2019-06-11

## 2019-06-10 RX ORDER — SENNA PLUS 8.6 MG/1
2 TABLET ORAL
Qty: 0 | Refills: 0 | DISCHARGE
Start: 2019-06-10

## 2019-06-10 RX ADMIN — Medication 100 MILLIGRAM(S): at 06:12

## 2019-06-10 RX ADMIN — Medication 325 MILLIGRAM(S): at 11:03

## 2019-06-10 RX ADMIN — POLYETHYLENE GLYCOL 3350 17 GRAM(S): 17 POWDER, FOR SOLUTION ORAL at 11:03

## 2019-06-10 RX ADMIN — Medication 100 MILLIGRAM(S): at 23:12

## 2019-06-10 RX ADMIN — PANTOPRAZOLE SODIUM 40 MILLIGRAM(S): 20 TABLET, DELAYED RELEASE ORAL at 07:40

## 2019-06-10 RX ADMIN — ATORVASTATIN CALCIUM 40 MILLIGRAM(S): 80 TABLET, FILM COATED ORAL at 23:12

## 2019-06-10 RX ADMIN — AMLODIPINE BESYLATE 5 MILLIGRAM(S): 2.5 TABLET ORAL at 10:00

## 2019-06-10 RX ADMIN — SENNA PLUS 2 TABLET(S): 8.6 TABLET ORAL at 23:12

## 2019-06-10 RX ADMIN — LOSARTAN POTASSIUM 100 MILLIGRAM(S): 100 TABLET, FILM COATED ORAL at 06:12

## 2019-06-10 RX ADMIN — AMLODIPINE BESYLATE 5 MILLIGRAM(S): 2.5 TABLET ORAL at 06:12

## 2019-06-10 RX ADMIN — Medication 1: at 11:03

## 2019-06-10 RX ADMIN — Medication 100 MILLIGRAM(S): at 13:05

## 2019-06-10 NOTE — PROGRESS NOTE ADULT - ASSESSMENT
Patient is a 62 yo M with a pmhx of HTN (poorly controlled despite reported compliance with bp meds) DM type 2 who presents to the ED with right facial droop and difficulty speaking. Patients reports that, in retrospect he felt off the evening before, when visiting with his grandchildren. Patient then developed right facial droop and dysarthria in the morning at 10am. s/p tPA and ICU observation, no events on tele as per ICU attending, transferred to medical floor, still with facial droop and dysarthria  Awaiting, insurance approval for AR placement

## 2019-06-10 NOTE — PROGRESS NOTE ADULT - SUBJECTIVE AND OBJECTIVE BOX
Patient is a 63y old  Male who presents with a chief complaint of CVA on tPA (10 Jay 2019 10:25)      INTERVAL HPI/OVERNIGHT EVENTS:  bp continues to be on high side, will increase norvasc  c/o constipation, had small bm yesterday, suppository overnight, reports no change in deficits post-stroke    MEDICATIONS  (STANDING):  amLODIPine   Tablet 5 milliGRAM(s) Oral daily  aspirin 325 milliGRAM(s) Oral daily  atorvastatin 40 milliGRAM(s) Oral at bedtime  dextrose 5%. 1000 milliLiter(s) (50 mL/Hr) IV Continuous <Continuous>  dextrose 50% Injectable 12.5 Gram(s) IV Push once  dextrose 50% Injectable 25 Gram(s) IV Push once  dextrose 50% Injectable 25 Gram(s) IV Push once  docusate sodium 100 milliGRAM(s) Oral three times a day  insulin lispro (HumaLOG) corrective regimen sliding scale   SubCutaneous at bedtime  insulin lispro (HumaLOG) corrective regimen sliding scale   SubCutaneous three times a day before meals  losartan 100 milliGRAM(s) Oral daily  pantoprazole    Tablet 40 milliGRAM(s) Oral before breakfast  polyethylene glycol 3350 17 Gram(s) Oral daily  senna 2 Tablet(s) Oral at bedtime    MEDICATIONS  (PRN):  dextrose 40% Gel 15 Gram(s) Oral once PRN Blood Glucose LESS THAN 70 milliGRAM(s)/deciliter  glucagon  Injectable 1 milliGRAM(s) IntraMuscular once PRN Glucose LESS THAN 70 milligrams/deciliter      Allergies    No Known Allergies    Intolerances        REVIEW OF SYSTEMS:  CONSTITUTIONAL: No fever, weight loss, or fatigue  EYES: No eye pain, visual disturbances, or discharge  ENMT:  No difficulty hearing, tinnitus, vertigo; No sinus or throat pain  NECK: No pain or stiffness  RESPIRATORY: No cough, wheezing, chills or hemoptysis; No shortness of breath  CARDIOVASCULAR: No chest pain, palpitations, dizziness, or leg swelling  GASTROINTESTINAL: No abdominal or epigastric pain. No nausea, vomiting, or hematemesis; +constipation  GENITOURINARY: No dysuria, frequency, hematuria, or incontinence  NEUROLOGICAL: No headaches, memory loss, loss of strength, numbness, or tremors, +dysarthria, +right facial droop, +dysphagia  SKIN: No itching, burning, rashes, or lesions   MUSCULOSKELETAL: No joint pain or swelling; No muscle, back, or extremity pain  PSYCHIATRIC: No depression, anxiety, mood swings, or difficulty sleeping  HEME/LYMPH: No easy bruising, or bleeding gums  ALLERGY AND IMMUNOLOGIC: No hives or eczema    Vital Signs Last 24 Hrs  T(C): 35.8 (10 Jay 2019 05:00), Max: 36.8 (09 Jun 2019 18:30)  T(F): 96.5 (10 Jay 2019 05:00), Max: 98.2 (09 Jun 2019 18:30)  HR: 16 (10 Jay 2019 05:00) (16 - 85)  BP: 171/91 (10 Jay 2019 05:00) (151/76 - 190/90)  BP(mean): --  RR: 16 (10 Jay 2019 05:00) (16 - 17)  SpO2: 95% (10 Jay 2019 05:00) (95% - 98%)    PHYSICAL EXAM:  GENERAL: NAD, well-groomed, well-developed  HEAD:  Atraumatic, Normocephalic  EYES: EOMI, PERRLA, conjunctiva and sclera clear  ENMT: No tonsillar erythema, exudates, or enlargement; Moist mucous membranes, Good dentition, No lesions  NECK: Supple, No JVD, Normal thyroid  NERVOUS SYSTEM:  Alert & Oriented X3, Good concentration; Motor Strength 5/5 B/L upper and lower extremities; DTRs 2+ intact and symmetric, +right facial droop, +dysarthria  CHEST/LUNG: Clear to percussion bilaterally; No rales, rhonchi, wheezing, or rubs  HEART: Regular rate and rhythm; No murmurs, rubs, or gallops  ABDOMEN: Soft, Nontender, Nondistended; Bowel sounds present  EXTREMITIES:  2+ Peripheral Pulses, No clubbing, cyanosis, or edema      LABS:                        14.0   5.00  )-----------( 161      ( 10 Jay 2019 06:13 )             40.7     06-10    138  |  103  |  18  ----------------------------<  131<H>  3.6   |  28  |  1.00    Ca    9.1      10 Jay 2019 06:13          CAPILLARY BLOOD GLUCOSE      POCT Blood Glucose.: 158 mg/dL (10 Jay 2019 10:57)  POCT Blood Glucose.: 123 mg/dL (10 Jay 2019 07:25)  POCT Blood Glucose.: 141 mg/dL (09 Jun 2019 22:06)  POCT Blood Glucose.: 139 mg/dL (09 Jun 2019 16:48)      RADIOLOGY & ADDITIONAL TESTS:    Imaging Personally Reviewed:  [ ] YES  [ ] NO    Consultant(s) Notes Reviewed:  [ x] YES  [ ] NO    Care Discussed with Consultants/Other Providers x[ ] YES  [ ] NO

## 2019-06-11 ENCOUNTER — INPATIENT (INPATIENT)
Facility: HOSPITAL | Age: 64
LOS: 7 days | Discharge: HOME | DRG: 949 | End: 2019-06-19
Attending: STUDENT IN AN ORGANIZED HEALTH CARE EDUCATION/TRAINING PROGRAM | Admitting: STUDENT IN AN ORGANIZED HEALTH CARE EDUCATION/TRAINING PROGRAM
Payer: COMMERCIAL

## 2019-06-11 VITALS — SYSTOLIC BLOOD PRESSURE: 132 MMHG | DIASTOLIC BLOOD PRESSURE: 72 MMHG

## 2019-06-11 VITALS
RESPIRATION RATE: 14 BRPM | HEART RATE: 64 BPM | HEIGHT: 68 IN | TEMPERATURE: 97 F | OXYGEN SATURATION: 95 % | WEIGHT: 237.44 LBS | SYSTOLIC BLOOD PRESSURE: 161 MMHG | DIASTOLIC BLOOD PRESSURE: 90 MMHG

## 2019-06-11 DIAGNOSIS — I63.9 CEREBRAL INFARCTION, UNSPECIFIED: ICD-10-CM

## 2019-06-11 LAB
GLUCOSE BLDC GLUCOMTR-MCNC: 141 MG/DL — HIGH (ref 70–99)
GLUCOSE BLDC GLUCOMTR-MCNC: 170 MG/DL — HIGH (ref 70–99)

## 2019-06-11 PROCEDURE — 99239 HOSP IP/OBS DSCHRG MGMT >30: CPT

## 2019-06-11 RX ORDER — SENNA PLUS 8.6 MG/1
2 TABLET ORAL AT BEDTIME
Refills: 0 | Status: DISCONTINUED | OUTPATIENT
Start: 2019-06-11 | End: 2019-06-19

## 2019-06-11 RX ORDER — POLYETHYLENE GLYCOL 3350 17 G/17G
17 POWDER, FOR SOLUTION ORAL DAILY
Refills: 0 | Status: DISCONTINUED | OUTPATIENT
Start: 2019-06-11 | End: 2019-06-19

## 2019-06-11 RX ORDER — ASPIRIN/CALCIUM CARB/MAGNESIUM 324 MG
325 TABLET ORAL DAILY
Refills: 0 | Status: DISCONTINUED | OUTPATIENT
Start: 2019-06-11 | End: 2019-06-19

## 2019-06-11 RX ORDER — LINAGLIPTIN 5 MG/1
1 TABLET, FILM COATED ORAL
Qty: 30 | Refills: 0
Start: 2019-06-11 | End: 2019-07-10

## 2019-06-11 RX ORDER — ATORVASTATIN CALCIUM 80 MG/1
40 TABLET, FILM COATED ORAL AT BEDTIME
Refills: 0 | Status: DISCONTINUED | OUTPATIENT
Start: 2019-06-11 | End: 2019-06-19

## 2019-06-11 RX ORDER — PANTOPRAZOLE SODIUM 20 MG/1
40 TABLET, DELAYED RELEASE ORAL
Refills: 0 | Status: DISCONTINUED | OUTPATIENT
Start: 2019-06-11 | End: 2019-06-19

## 2019-06-11 RX ORDER — ASPIRIN/CALCIUM CARB/MAGNESIUM 324 MG
1 TABLET ORAL
Qty: 30 | Refills: 0
Start: 2019-06-11 | End: 2019-07-10

## 2019-06-11 RX ORDER — LOSARTAN POTASSIUM 100 MG/1
100 TABLET, FILM COATED ORAL DAILY
Refills: 0 | Status: DISCONTINUED | OUTPATIENT
Start: 2019-06-11 | End: 2019-06-19

## 2019-06-11 RX ORDER — DOCUSATE SODIUM 100 MG
100 CAPSULE ORAL THREE TIMES A DAY
Refills: 0 | Status: DISCONTINUED | OUTPATIENT
Start: 2019-06-11 | End: 2019-06-19

## 2019-06-11 RX ORDER — AMLODIPINE BESYLATE 2.5 MG/1
10 TABLET ORAL DAILY
Refills: 0 | Status: DISCONTINUED | OUTPATIENT
Start: 2019-06-11 | End: 2019-06-19

## 2019-06-11 RX ORDER — VALSARTAN 80 MG/1
1 TABLET ORAL
Qty: 0 | Refills: 0 | DISCHARGE

## 2019-06-11 RX ORDER — VALSARTAN 80 MG/1
1 TABLET ORAL
Qty: 30 | Refills: 0
Start: 2019-06-11 | End: 2019-07-10

## 2019-06-11 RX ORDER — LINAGLIPTIN 5 MG/1
1 TABLET, FILM COATED ORAL
Qty: 0 | Refills: 0 | DISCHARGE

## 2019-06-11 RX ADMIN — Medication 100 MILLIGRAM(S): at 13:00

## 2019-06-11 RX ADMIN — SENNA PLUS 2 TABLET(S): 8.6 TABLET ORAL at 21:32

## 2019-06-11 RX ADMIN — Medication 325 MILLIGRAM(S): at 11:12

## 2019-06-11 RX ADMIN — AMLODIPINE BESYLATE 10 MILLIGRAM(S): 2.5 TABLET ORAL at 05:54

## 2019-06-11 RX ADMIN — POLYETHYLENE GLYCOL 3350 17 GRAM(S): 17 POWDER, FOR SOLUTION ORAL at 11:13

## 2019-06-11 RX ADMIN — Medication 100 MILLIGRAM(S): at 05:54

## 2019-06-11 RX ADMIN — Medication 1: at 11:12

## 2019-06-11 RX ADMIN — ATORVASTATIN CALCIUM 40 MILLIGRAM(S): 80 TABLET, FILM COATED ORAL at 21:32

## 2019-06-11 RX ADMIN — LOSARTAN POTASSIUM 100 MILLIGRAM(S): 100 TABLET, FILM COATED ORAL at 05:54

## 2019-06-11 RX ADMIN — Medication 0.2 MILLIGRAM(S): at 16:37

## 2019-06-11 RX ADMIN — PANTOPRAZOLE SODIUM 40 MILLIGRAM(S): 20 TABLET, DELAYED RELEASE ORAL at 07:42

## 2019-06-11 RX ADMIN — Medication 100 MILLIGRAM(S): at 21:32

## 2019-06-11 NOTE — H&P ADULT - ATTENDING COMMENTS
Pt. seen with resident 6/12/19.  Agree with documentation above as per resident on-call with amendments made as appropriate. Patient medically stable. See progress note

## 2019-06-11 NOTE — PROGRESS NOTE ADULT - PROBLEM SELECTOR PLAN 1
continue with aspirin and statin  cont bp and DM control  OT/PT recommend acute rehab.  Pt. can tolerate and would benefit from AR
continue with aspirin and statin  cont bp and DM control  OT/PT recommend acute rehab.  Pt. can tolerate and would benefit from AR  Awaiting insurance auth.
continue with aspirin and statin  cont bp and DM control  OT/PT recommend acute rehab.  Pt. can tolerate and would benefit from AR

## 2019-06-11 NOTE — PROGRESS NOTE ADULT - PROBLEM SELECTOR PROBLEM 5
Oropharyngeal dysphagia
Dysarthria due to acute stroke
Oropharyngeal dysphagia

## 2019-06-11 NOTE — H&P ADULT - NSHPSOCIALHISTORY_GEN_ALL_CORE
Smoking - Former, quit 40yrs ago  EtOH - Social  Drugs - Social marijuana use    Lives w/ wife in [place] 16STE, 15 inside  PTA: Independent in ADLs and ambulation

## 2019-06-11 NOTE — PROGRESS NOTE ADULT - PROBLEM SELECTOR PROBLEM 3
DM (diabetes mellitus)
Essential hypertension
DM (diabetes mellitus)

## 2019-06-11 NOTE — PROGRESS NOTE ADULT - PROBLEM SELECTOR PLAN 6
dysphagia diet, monitor for improvement
dysphagia diet, monitor for improvement  speech changed from thin liquids to nectar, given report of coughing with thin liquids
dysphagia diet, monitor for improvement  speech changed from thin liquids to nectar, given report of coughing with thin liquids
no signs of decompensation  follow as outpatient, continue current cardiac meds
no signs of decompensation  follow as outpatient, continue current cardiac meds

## 2019-06-11 NOTE — PROGRESS NOTE ADULT - PROBLEM SELECTOR PROBLEM 2
Constipation by delayed colonic transit
Essential hypertension
Essential hypertension

## 2019-06-11 NOTE — PROGRESS NOTE ADULT - PROBLEM SELECTOR PLAN 2
Add Dulcolax rectal suppository, continue with bowel regimen.
continue with ARB, norvasc, permissive bp in the setting of acute stroke.
continue with ARB, norvasc, permissive bp in the setting of acute stroke, add in other agents or increase as needed

## 2019-06-11 NOTE — PROGRESS NOTE ADULT - PROBLEM SELECTOR PLAN 3
continue with ISS, and d/c on home meds  well controlled with Hgb A1C of 5.2
continue with ARB, norvasc, permissive bp in the setting of acute stroke.
continue with ARB, norvasc, permissive bp in the setting of acute stroke.  increase norvasc to 10mg today
continue with ARB, norvasc, permissive bp in the setting of acute stroke.  increase norvasc to 10mg yesterday
continue with ISS, and d/c on home meds  well controlled with Hgb A1C of 5.2

## 2019-06-11 NOTE — PROGRESS NOTE ADULT - PROBLEM SELECTOR PROBLEM 4
Dysarthria due to acute stroke
DM (diabetes mellitus)
Dysarthria due to acute stroke

## 2019-06-11 NOTE — PROGRESS NOTE ADULT - PROBLEM SELECTOR PLAN 4
speech therapy
continue with ISS, and d/c on home meds  well controlled with Hgb A1C of 5.2
speech therapy

## 2019-06-11 NOTE — PROGRESS NOTE ADULT - PROBLEM SELECTOR PROBLEM 6
Chronic diastolic heart failure
Oropharyngeal dysphagia
Chronic diastolic heart failure

## 2019-06-11 NOTE — H&P ADULT - HISTORY OF PRESENT ILLNESS
Patient is a 63y old  Male who presents with a chief complaint of     HPI:  Patient is a 62 yo M with a pmhx of HTN (poorly controlled despite reported compliance with bp meds) DM type 2 who presents to the ER with right facial droop and difficulty speaking. Patients reports that in he felt off the evening before when visiting with his grandchildren. Patient then developed right. facial droop and dysarthria the next morning around 10am.  Denied paresthesias weakness, dizziness, vision loss, LOC, chest pain, SOB, N/V/D, fever, and urinary symptoms.     In the ER patient was code stroke, CTA Brain neg for intracranial hemorrhage and involutional and ischemic gliotic changes. Patient received tPA and labetelol. BP of 235/108 was reduced to 156/75.   Pt. admitted to icu, no events on tele for 24 hours as per icu attending, Dr. Tafoya. Pt. evaluated by neuro, speech (will need speech therapy and dsyphagia diet). OT/PT recommend acute rehab,   Pt. can tolerate and benefit from acute rehab.= Tolerating slightly high bp for perfusion s/p stroke , continue to monitor and increase or add other bp agents as needed. Increased norvasc to 10mg      Any Major Surgery within past 100 days? No Yes     Two or more Falls within past one year?  No Yes                   One Fall with injury past one year?  No Yes SHADE CUELLO is a 64yo Male with pmhx of HTN (poorly controlled despite reported compliance with bp meds) DM type 2 who presents to the ER with right facial droop and difficulty speaking. Patients reports that in he felt off the evening before when visiting with his grandchildren. Patient then developed right. facial droop and dysarthria the next morning around 10am.  Denied paresthesias weakness, dizziness, vision loss, LOC, chest pain, SOB, N/V/D, fever, and urinary symptoms.     In the ER patient was code stroke, CTA Brain neg for intracranial hemorrhage and involutional and ischemic gliotic changes. Patient received tPA and labetelol. BP of 235/108 was reduced to 156/75.   Pt. admitted to icu, no events on tele for 24 hours as per icu attending, Dr. Tafoya. Pt. evaluated by neuro, speech (will need speech therapy and dsyphagia diet). OT/PT recommend acute rehab,   Pt. can tolerate and benefit from acute rehab.= Tolerating slightly high bp for perfusion s/p stroke , continue to monitor and increase or add other bp agents as needed. Increased norvasc to 10mg

## 2019-06-11 NOTE — H&P ADULT - NSHPPHYSICALEXAM_GEN_ALL_CORE
PHYSICAL EXAMINATION     General: NAD, Resting Comfortable,                                  HEENT: NC/AT, EOM I, PERRLA, Normal Conjunctivae  Cardio: RRR, Normal S1-S2, No M/G/R                              Pulm: No Respiratory Distress,  Lungs CTAB                        Abdomen: ND/NT, Soft, BS+                                                Breast/Pelvic: not necessary   MSK: No joint swelling, Full ROM                                         Ext: No C/C/E, Pulses 2+ throughout, No calf tenderness    Skin:  all skin intact                                                                 Wounds: none  Decubitus Ulcers: None Present     Neurological Examination    Cognitive: AAO x 3                                                                                                                                                  CN II - XII  intact                                                                      Sensory: Intact to light touch, PP and Vibration   Coordination: FTN/HTS intact                                                                                                 Tone: normal Throughout     Motor    LEFT    UE: SF 5/5, EF 5/5, EE 5/5, WE 5/5, Hand Intrinsics 5/5,  wnl  RIGHT UE: SF 5/5, EF 5/5, EE 5/5, WE 5/5, Hand Intrinsics 5/5,  wnl  LEFT    LE:  HF 5/5, KE 5/5, DF 5/5, EHL 5/5,  PF 5/5  RIGHT LE:  HF 5/5, KE 5/5, DF 5/5, EHL 5/5,  PF 5/5     Reflex:  2 + thoroughout, [place]/Babinski negative     Mood/Affect: wnl Vital Signs  T(C): 36.3 (06-11 @ 20:07)  HR: 64 (06-11 @ 20:07)  BP: 161/90 (06-11 @ 20:07)  RR: 14 (06-11 @ 20:07)  SpO2: 95% (06-11 @ 20:07)    Constitutional - NAD, Comfortable  HEENT - NCAT, EOMI  Neck - Supple  Chest - CTAB  Cardiovascular - RRR  Abdomen - BS+, Soft, NTND  Extremities - No C/C/E, No calf tenderness   Neurologic Exam -                    Cognitive - Awake, Alert, AAO to self, place, date, year, situation     Communication - +Dysarthria, intact naming, comprehension, and repetition     Cranial Nerves - R facial droop     Motor - 5/5 throughout       Sensory - Intact to LT     Reflexes - 1+ throughout     Coordination - FTN and HTS were intact bilaterally  Psychiatric - Mood stable, Affect WNL  Skin - intact

## 2019-06-11 NOTE — PROGRESS NOTE ADULT - ASSESSMENT
Patient is a 64 yo M with a pmhx of HTN (poorly controlled despite reported compliance with bp meds) DM type 2 who presents to the ED with right facial droop and difficulty speaking. Patients reports that, in retrospect he felt off the evening before, when visiting with his grandchildren. Patient then developed right facial droop and dysarthria in the morning at 10am. s/p tPA and ICU observation, no events on tele as per ICU attending, transferred to medical floor, still with facial droop and dysarthria  Awaiting, insurance approval for AR placement

## 2019-06-11 NOTE — PROGRESS NOTE ADULT - SUBJECTIVE AND OBJECTIVE BOX
Patient is a 63y old  Male who presents with a chief complaint of CVA on tPA (10 Jay 2019 11:48)      INTERVAL HPI/OVERNIGHT EVENTS:  c/o lack of coordination in right hand, confused about plan, daughter at bedside, reviewed AR, [facility], home with outpatient services.    MEDICATIONS  (STANDING):  amLODIPine   Tablet 5 milliGRAM(s) Oral daily  amLODIPine   Tablet 10 milliGRAM(s) Oral daily  aspirin 325 milliGRAM(s) Oral daily  atorvastatin 40 milliGRAM(s) Oral at bedtime  dextrose 5%. 1000 milliLiter(s) (50 mL/Hr) IV Continuous <Continuous>  dextrose 50% Injectable 12.5 Gram(s) IV Push once  dextrose 50% Injectable 25 Gram(s) IV Push once  dextrose 50% Injectable 25 Gram(s) IV Push once  docusate sodium 100 milliGRAM(s) Oral three times a day  insulin lispro (HumaLOG) corrective regimen sliding scale   SubCutaneous at bedtime  insulin lispro (HumaLOG) corrective regimen sliding scale   SubCutaneous three times a day before meals  losartan 100 milliGRAM(s) Oral daily  pantoprazole    Tablet 40 milliGRAM(s) Oral before breakfast  polyethylene glycol 3350 17 Gram(s) Oral daily  senna 2 Tablet(s) Oral at bedtime    MEDICATIONS  (PRN):  dextrose 40% Gel 15 Gram(s) Oral once PRN Blood Glucose LESS THAN 70 milliGRAM(s)/deciliter  glucagon  Injectable 1 milliGRAM(s) IntraMuscular once PRN Glucose LESS THAN 70 milligrams/deciliter      Allergies    No Known Allergies    Intolerances        REVIEW OF SYSTEMS:  CONSTITUTIONAL: No fever, weight loss, or fatigue  EYES: No eye pain, visual disturbances, or discharge  ENMT:  No difficulty hearing, tinnitus, vertigo; No sinus or throat pain  RESPIRATORY: No cough, wheezing, chills or hemoptysis; No shortness of breath  CARDIOVASCULAR: No chest pain, palpitations, dizziness, or leg swelling  GASTROINTESTINAL: No abdominal or epigastric pain. No nausea, vomiting, or hematemesis; No diarrhea or constipation. No melena or hematochezia.  GENITOURINARY: No dysuria, frequency, hematuria, or incontinence  NEUROLOGICAL: c/o lack of coordination and weakness of right hand, continued dysarthria, dysphagia, facial droop  SKIN: No itching, burning, rashes, or lesions   MUSCULOSKELETAL: No joint pain or swelling; No muscle, back, or extremity pain  PSYCHIATRIC: mood somewhat depressed due to condition      Vital Signs Last 24 Hrs  T(C): 36.9 (11 Jun 2019 05:25), Max: 36.9 (11 Jun 2019 05:25)  T(F): 98.4 (11 Jun 2019 05:25), Max: 98.4 (11 Jun 2019 05:25)  HR: 66 (11 Jun 2019 05:25) (62 - 78)  BP: 174/86 (11 Jun 2019 05:25) (129/69 - 190/95)  BP(mean): --  RR: 17 (11 Jun 2019 05:25) (16 - 96)  SpO2: 97% (11 Jun 2019 05:25) (95% - 99%)    PHYSICAL EXAM:  GENERAL: NAD, well-groomed, well-developed  HEAD:  Atraumatic, Normocephalic  EYES: EOMI, PERRLA, conjunctiva and sclera clear  ENMT: No tonsillar erythema, exudates, or enlargement; Moist mucous membranes,  NECK: Supple, No JVD, Normal thyroid  NERVOUS SYSTEM:  Alert & Oriented X3, Good concentration; strength grossly intact in all extremities, right hand with fine motor skill deficits, right sided facial droop, speech garbled but comprehensible  CHEST/LUNG: Clear to percussion bilaterally; No rales, rhonchi, wheezing, or rubs  HEART: Regular rate and rhythm; No murmurs, rubs, or gallops  ABDOMEN: Soft, Nontender, Nondistended; Bowel sounds present  EXTREMITIES:  2+ Peripheral Pulses, No clubbing, cyanosis, or edema  SKIN: No rashes or lesions    LABS:                        14.0   5.00  )-----------( 161      ( 10 Jay 2019 06:13 )             40.7     06-10    138  |  103  |  18  ----------------------------<  131<H>  3.6   |  28  |  1.00    Ca    9.1      10 Jay 2019 06:13          CAPILLARY BLOOD GLUCOSE      POCT Blood Glucose.: 120 mg/dL (11 Jun 2019 07:34)  POCT Blood Glucose.: 123 mg/dL (10 Jay 2019 23:18)  POCT Blood Glucose.: 111 mg/dL (10 Jay 2019 15:38)  POCT Blood Glucose.: 158 mg/dL (10 Jay 2019 10:57)      RADIOLOGY & ADDITIONAL TESTS:    Imaging Personally Reviewed:  [ ] YES  [ ] NO    Consultant(s) Notes Reviewed:  [x ] YES  [ ] NO    Care Discussed with Consultants/Other Providers [x ] YES  [ ] NO

## 2019-06-11 NOTE — PROGRESS NOTE ADULT - PROBLEM SELECTOR PLAN 5
dysphagia diet, monitor for improvement
dysphagia diet, monitor for improvement
speech therapy

## 2019-06-11 NOTE — PROGRESS NOTE ADULT - PROVIDER SPECIALTY LIST ADULT
Critical Care
Hospitalist
Neurology
Hospitalist

## 2019-06-11 NOTE — H&P ADULT - NSHPLABSRESULTS_GEN_ALL_CORE
14.0   5.00  )-----------( 161      ( 10 Jay 2019 06:13 )             40.7     06-10    138  |  103  |  18  ----------------------------<  131<H>  3.6   |  28  |  1.00    Ca    9.1      10 Jay 2019 06:13              < from: CT Brain Stroke Protocol (06.05.19 @ 12:53) >      EXAM:  CT BRAIN STROKE PROTOCOL                            PROCEDURE DATE:  06/05/2019          INTERPRETATION:  CLINICAL INFORMATION: CVA, right facial droop and   slurred speech/dysarthria.    COMPARISON: None.    PROCEDURE:    Axial sections of the brain were obtained from base to vertex, without   the intravenous administration of contrast material. Sagittal and coronal   reformats were then generated from the axial images.    FINDINGS:    There is no intracranial hemorrhage, mass or shift of the midline   structures. There are involutional changes. Small vessel white matter   ischemic changes are noted. There are basal ganglia calcifications.    The brain stem is unremarkable.  No cerebellopontine angle lesion is   appreciated.     Thefourth, third and lateral ventricles are normal position.  No   subdural or epidural collections are present.     No fracture or destructive lesion is identified.     The mastoids are clear. There are polyps or retention cysts within the   left maxillary sinus.    IMPRESSION:    Involutional and ischemic gliotic changes.  No acute intracranial hemorrhage.    Findings were discussed with Dr. Gaytan by telephone on 6/5/2019 at   1300 hours.    < end of copied text > RECENT LABS/IMAGING                        14.0   5.00  )-----------( 161      ( 10 Jay 2019 06:13 )             40.7     06-10    138  |  103  |  18  ----------------------------<  131<H>  3.6   |  28  |  1.00    Ca    9.1      10 Jay 2019 06:13    Hemoglobin A1C, Whole Blood: 5.2% (06.06.19 @ 09:12)    Lipid Profile in AM (06.06.19 @ 09:17)    Total Cholesterol/HDL Ratio Measurement: 3.0 RATIO    Cholesterol, Serum: 161 mg/dL    Triglycerides, Serum: 59 mg/dL    HDL Cholesterol, Serum: 54mg/dL    Direct LDL: 95mg/dL    < from: CT Brain Stroke Protocol (06.05.19 @ 12:53) >  IMPRESSION: Involutional and ischemic gliotic changes. No acute intracranial hemorrhage.    < from: MR Head No Cont (06.06.19 @ 14:31) >  IMPRESSION:      1)  a periventricular and basal ganglia acute infarct is noted on the left in the MCA territory. Otherwise there are very mild scattered chronic ischemic changes with volume loss. No large vessel occlusion suggested..  2)  scattered thickening noted in the sinuses without fluid levels.    < from: TTE Echo Doppler w/o Cont (06.06.19 @ 07:23) >  Summary:   1. Left ventricular ejection fraction, by visual estimation, is 60 to 65%.   2. Technically fair study.   3. Normal global left ventricular systolic function.   4. Normal left ventricular internal cavity size.   5. Spectral Doppler shows impaired relaxation pattern of left ventricular myocardial filling (Grade I diastolic dysfunction).   6. Normal right ventricular size and function.   7. There is no evidence of pericardial effusion.   8. Structurally normal mitral valve, with normal leaflet excursion.   9. Trace mitral valve regurgitation.

## 2019-06-11 NOTE — H&P ADULT - NSHPREVIEWOFSYSTEMS_GEN_ALL_CORE
[X] Constitutional WNL       [X] HEENT   [X] Cardio WNL              [X] Resp WNL            [X] GI WNL                            [X]  WNL                               [X] MSK WNL            [X] Neuro WNL                     [X] Cognitive WNL   [X] Psych WNL  [X] Skin WNL      [X] Heme WNL              [X] Endo WNL REVIEW OF SYSTEMS  Constitutional - Denies fevers, chills  HEENT - Denies changes in vision or hearing  Respiratory - Denies cough, dyspnea  Cardiovascular - Denies chest pain, palpitations  Gastrointestinal - +Constipation, last BM yesterday  Genitourinary - Denies dysuria, urinary incontinence  Neurological - +Dysarthria +RUE weakness  Skin - Denies rashes  Musculoskeletal - Denies arthralgia, myalgias, back pain  Psychiatric - +depressed mood, +anxiety regarding current medical situation

## 2019-06-11 NOTE — H&P ADULT - ASSESSMENT
ASSESSMENT/PLAN  63 year-old Man with a PMH of HTN who was found to have an acute CVA and is now with Gait Instability, ADL impairments and Functional impairments.    COMORBIDITIES / ACTIVE MEDICAL ISSUES     Gait Instability, ADL impairments and Functional impairments 2/2 acute CVA:   - start Comprehensive Rehab Program of PT/OT   - Pain Mgmt - Tylenol PRN  - GI/Bowel Mgmt -  Colace, Senna, Miralax PRN,  - /Bladder Mgmt -  PVR    HTN  - norvasc, losartan    FEN   - modified diet - soft + nectar   - Dysphagia  SLP - evaluation and treatment    Precautions / PROPHYLAXIS:   - Falls,   - ortho: Weight bearing status: WBAT   - Lungs: Aspiration, Incentive Spirometer (patient instructed at the bedside)  - Pressure injury/Skin: Turn Q2hrs while in bed, OOB to Chair, PT/OT    - DVT: Lovenox, SCDs, TEDs     MEDICAL PROGNOSIS: GOOD            REHAB POTENTIAL: GOOD             ESTIMATED DISPOSITION: HOME            ELOS: 10-14 Days   EXPECTED THERAPY:     P.T. 1hr/day  and   O.T. 1hr/day and if necessary  S.L.P. 1hr/day      EXP FREQUENCY: 5 days per 7 day period     PRESCREEN COMPARISON:   I have reviewed the prescreen information and I have found no relevant changes between the preadmission screening and my post admission evaluation     RATIONALE FOR INPATIENT ADMISSION - Patient demonstrates the following: (check all that apply)  [X] Medically appropriate for rehabilitation admission  [X] Has attainable rehab goals with an appropriate initial discharge plan  [X] Has rehabilitation potential (expected to make a significant improvement within a reasonable period of time)   [X] Requires close medical management by a rehab physician, rehab nursing care, Hospitalist and comprehensive interdisciplinary team (including PT, OT, & or SLP, Prosthetics and Orthotics) ASSESSMENT/PLAN  63 year-old Man with a PMH of HTN, T2DM, HFpEF, who was found to have an acute CVA and is now with Gait Instability, ADL impairments and Functional impairments.    COMORBIDITIES / ACTIVE MEDICAL ISSUES     Gait Instability, ADL impairments and Functional impairments 2/2 acute CVA:   - start Comprehensive Rehab Program of PT/OT/SLP  - Pain Mgmt - Tylenol PRN  - GI/Bowel Mgmt -  Colace, Senna, Miralax PRN,  - /Bladder Mgmt -  PVR    Dysarthria  - SLP eval    HTN  - Amlodipine, losartan    Compensated HFpEF (EF60% on 6/6/19)  - f/u cards outpt  - Cardiac meds as above    DM: A1c 5.2  - CC diet  - Consider asking pt to bring in home linagliptin as pt's DM is under superb control    FEN   - modified diet - soft + nectar   - Dysphagia  SLP - evaluation and treatment    Precautions / PROPHYLAXIS:   - Falls,   - ortho: Weight bearing status: WBAT   - Lungs: Aspiration, Incentive Spirometer (patient instructed at the bedside)  - Pressure injury/Skin: Turn Q2hrs while in bed, OOB to Chair, PT/OT    - DVT: Lovenox, SCDs, TEDs     MEDICAL PROGNOSIS: GOOD            REHAB POTENTIAL: GOOD             ESTIMATED DISPOSITION: HOME            ELOS: 10-14 Days   EXPECTED THERAPY:     P.T. 1hr/day  and   O.T. 1hr/day and if necessary  S.L.P. 1hr/day      EXP FREQUENCY: 5 days per 7 day period     PRESCREEN COMPARISON:   I have reviewed the prescreen information and I have found no relevant changes between the preadmission screening and my post admission evaluation     RATIONALE FOR INPATIENT ADMISSION - Patient demonstrates the following: (check all that apply)  [X] Medically appropriate for rehabilitation admission  [X] Has attainable rehab goals with an appropriate initial discharge plan  [X] Has rehabilitation potential (expected to make a significant improvement within a reasonable period of time)   [X] Requires close medical management by a rehab physician, rehab nursing care, Hospitalist and comprehensive interdisciplinary team (including PT, OT, & or SLP, Prosthetics and Orthotics)

## 2019-06-12 PROBLEM — E11.9 TYPE 2 DIABETES MELLITUS WITHOUT COMPLICATIONS: Chronic | Status: ACTIVE | Noted: 2019-06-05

## 2019-06-12 PROBLEM — I10 ESSENTIAL (PRIMARY) HYPERTENSION: Chronic | Status: ACTIVE | Noted: 2019-06-05

## 2019-06-12 LAB
ALBUMIN SERPL ELPH-MCNC: 3.3 G/DL — SIGNIFICANT CHANGE UP (ref 3.3–5)
ALP SERPL-CCNC: 55 U/L — SIGNIFICANT CHANGE UP (ref 40–120)
ALT FLD-CCNC: 19 U/L DA — SIGNIFICANT CHANGE UP (ref 10–45)
ANION GAP SERPL CALC-SCNC: 6 MMOL/L — SIGNIFICANT CHANGE UP (ref 5–17)
AST SERPL-CCNC: 15 U/L — SIGNIFICANT CHANGE UP (ref 10–40)
BASOPHILS # BLD AUTO: 0.04 K/UL — SIGNIFICANT CHANGE UP (ref 0–0.2)
BASOPHILS NFR BLD AUTO: 1 % — SIGNIFICANT CHANGE UP (ref 0–2)
BILIRUB SERPL-MCNC: 2.2 MG/DL — HIGH (ref 0.2–1.2)
BUN SERPL-MCNC: 20 MG/DL — SIGNIFICANT CHANGE UP (ref 7–23)
CALCIUM SERPL-MCNC: 9.5 MG/DL — SIGNIFICANT CHANGE UP (ref 8.4–10.5)
CHLORIDE SERPL-SCNC: 101 MMOL/L — SIGNIFICANT CHANGE UP (ref 96–108)
CO2 SERPL-SCNC: 30 MMOL/L — SIGNIFICANT CHANGE UP (ref 22–31)
CREAT SERPL-MCNC: 1.09 MG/DL — SIGNIFICANT CHANGE UP (ref 0.5–1.3)
EOSINOPHIL # BLD AUTO: 0.19 K/UL — SIGNIFICANT CHANGE UP (ref 0–0.5)
EOSINOPHIL NFR BLD AUTO: 4.6 % — SIGNIFICANT CHANGE UP (ref 0–6)
GLUCOSE SERPL-MCNC: 143 MG/DL — HIGH (ref 70–99)
HCT VFR BLD CALC: 39.7 % — SIGNIFICANT CHANGE UP (ref 39–50)
HGB BLD-MCNC: 13.8 G/DL — SIGNIFICANT CHANGE UP (ref 13–17)
IMM GRANULOCYTES NFR BLD AUTO: 0.5 % — SIGNIFICANT CHANGE UP (ref 0–1.5)
LYMPHOCYTES # BLD AUTO: 1.48 K/UL — SIGNIFICANT CHANGE UP (ref 1–3.3)
LYMPHOCYTES # BLD AUTO: 35.9 % — SIGNIFICANT CHANGE UP (ref 13–44)
MCHC RBC-ENTMCNC: 29.7 PG — SIGNIFICANT CHANGE UP (ref 27–34)
MCHC RBC-ENTMCNC: 34.8 GM/DL — SIGNIFICANT CHANGE UP (ref 32–36)
MCV RBC AUTO: 85.4 FL — SIGNIFICANT CHANGE UP (ref 80–100)
MONOCYTES # BLD AUTO: 0.36 K/UL — SIGNIFICANT CHANGE UP (ref 0–0.9)
MONOCYTES NFR BLD AUTO: 8.7 % — SIGNIFICANT CHANGE UP (ref 2–14)
NEUTROPHILS # BLD AUTO: 2.03 K/UL — SIGNIFICANT CHANGE UP (ref 1.8–7.4)
NEUTROPHILS NFR BLD AUTO: 49.3 % — SIGNIFICANT CHANGE UP (ref 43–77)
NRBC # BLD: 0 /100 WBCS — SIGNIFICANT CHANGE UP (ref 0–0)
PLATELET # BLD AUTO: 155 K/UL — SIGNIFICANT CHANGE UP (ref 150–400)
POTASSIUM SERPL-MCNC: 3.8 MMOL/L — SIGNIFICANT CHANGE UP (ref 3.5–5.3)
POTASSIUM SERPL-SCNC: 3.8 MMOL/L — SIGNIFICANT CHANGE UP (ref 3.5–5.3)
PROT SERPL-MCNC: 6.5 G/DL — SIGNIFICANT CHANGE UP (ref 6–8.3)
RBC # BLD: 4.65 M/UL — SIGNIFICANT CHANGE UP (ref 4.2–5.8)
RBC # FLD: 12.7 % — SIGNIFICANT CHANGE UP (ref 10.3–14.5)
SODIUM SERPL-SCNC: 137 MMOL/L — SIGNIFICANT CHANGE UP (ref 135–145)
WBC # BLD: 4.12 K/UL — SIGNIFICANT CHANGE UP (ref 3.8–10.5)
WBC # FLD AUTO: 4.12 K/UL — SIGNIFICANT CHANGE UP (ref 3.8–10.5)

## 2019-06-12 PROCEDURE — 96116 NUBHVL XM PHYS/QHP 1ST HR: CPT

## 2019-06-12 PROCEDURE — 99223 1ST HOSP IP/OBS HIGH 75: CPT

## 2019-06-12 RX ADMIN — Medication 325 MILLIGRAM(S): at 12:15

## 2019-06-12 RX ADMIN — Medication 100 MILLIGRAM(S): at 13:09

## 2019-06-12 RX ADMIN — PANTOPRAZOLE SODIUM 40 MILLIGRAM(S): 20 TABLET, DELAYED RELEASE ORAL at 05:42

## 2019-06-12 RX ADMIN — AMLODIPINE BESYLATE 10 MILLIGRAM(S): 2.5 TABLET ORAL at 05:42

## 2019-06-12 RX ADMIN — ATORVASTATIN CALCIUM 40 MILLIGRAM(S): 80 TABLET, FILM COATED ORAL at 21:03

## 2019-06-12 RX ADMIN — Medication 100 MILLIGRAM(S): at 05:42

## 2019-06-12 RX ADMIN — LOSARTAN POTASSIUM 100 MILLIGRAM(S): 100 TABLET, FILM COATED ORAL at 05:42

## 2019-06-12 RX ADMIN — Medication 100 MILLIGRAM(S): at 21:03

## 2019-06-12 RX ADMIN — SENNA PLUS 2 TABLET(S): 8.6 TABLET ORAL at 21:03

## 2019-06-12 NOTE — DIETITIAN INITIAL EVALUATION ADULT. - OTHER INFO
63yr Old Male - Dx: CVA - Initial Assessment - DASH-TLC Mechanical Soft (Dysphagia 2) Diet w/ Nectar Thick Liquids, Denies Food Allergy/Intolerance, Tolerates Diet Well, No Chewing/Swallowing Complications (Per Patient), Consumes 100% of Meals (as Per Documentation), No Pressure Ulcers (as Per Nursing Flow Sheets), No Edema Noted (as Per Nursing Flow Sheets), No Recent Nausea/Vomiting/Diarrhea (as Per Patient)

## 2019-06-12 NOTE — DIETITIAN INITIAL EVALUATION ADULT. - PERTINENT LABORATORY DATA
6/12 - Na-137 ,K-3.8 ,BUN-20 ,Creat-1.09 ,Gluc-143H, 5/2 - Hemoglobin A1c - 5.2, POCT (over Last 2 Days) - Ranging from 111-170

## 2019-06-12 NOTE — PROGRESS NOTE ADULT - ASSESSMENT
ASSESSMENT/PLAN  63 year-old Man with a PMH of HTN, T2DM, HFpEF, who was found to have an acute CVA and is now with Gait Instability, ADL impairments and Functional impairments.    COMORBIDITIES / ACTIVE MEDICAL ISSUES     Gait Instability, ADL impairments and Functional impairments 2/2 acute CVA:   - start Comprehensive Rehab Program of PT/OT/SLP  - Pain Mgmt - Tylenol PRN  - GI/Bowel Mgmt -  Colace, Senna, add Miralax PRN,  - /Bladder Mgmt -  monitor     Dysarthria  - SLP eval    HTN  - Amlodipine, losartan    Compensated HFpEF (EF60% on 6/6/19)  - f/u cards outpt  - Cardiac meds as above    DM: A1c 5.2  - CC diet  - Consider asking pt to bring in home linagliptin as pt's DM is under superb control    FEN   - upgraded diet - soft + thins   - Dysphagia  SLP - treatment    Precautions / PROPHYLAXIS:   - Falls,   - ortho: Weight bearing status: WBAT   - Lungs: Aspiration, Incentive Spirometer (patient instructed at the bedside)  - Pressure injury/Skin: Turn Q2hrs while in bed, OOB to Chair, PT/OT    - DVT: Lovenox, SCDs, TEDs ASSESSMENT/PLAN  63 year-old Man with a PMH of HTN, T2DM, HFpEF, who was found to have an acute CVA and is now with Gait Instability, ADL impairments and Functional impairments.    COMORBIDITIES / ACTIVE MEDICAL ISSUES     Gait Instability, ADL impairments and Functional impairments 2/2 acute CVA:   - start Comprehensive Rehab Program of PT/OT/SLP  - Pain Mgmt - Tylenol PRN  - GI/Bowel Mgmt -  Colace, Senna, add Miralax PRN,  - /Bladder Mgmt -  monitor     Dysarthria  - SLP eval    HTN  - Amlodipine, losartan    Compensated HFpEF (EF60% on 6/6/19)  - f/u cards outpt  - Cardiac meds as above    DM: A1c 5.2  - CC diet  -     FEN   - upgraded diet - soft + thins   - Dysphagia  SLP - treatment    Precautions / PROPHYLAXIS:   - Falls,   - Lungs: Aspiration, Incentive Spirometer (patient instructed at the bedside)  - Pressure injury/Skin: Turn Q2hrs while in bed, OOB to Chair, PT/OT    - DVT: Lovenox, SCDs, TEDs

## 2019-06-12 NOTE — DIETITIAN INITIAL EVALUATION ADULT. - NS AS NUTRI INTERV ED CONTENT2
Diet Education Provided on DASH-TLC Diet, Mechanical Soft (Dysphagia 2) Diet w/ Nectar Thick Liquids

## 2019-06-12 NOTE — DIETITIAN INITIAL EVALUATION ADULT. - ENERGY NEEDS
Height: 68Inches   Weight: 237lb   Body Mass Index (BMI): 36.1kg/m2   Ideal Body Weight Range: 154lb (+/-10%)   Percent Ideal Body Weight ~153%

## 2019-06-12 NOTE — DIETITIAN INITIAL EVALUATION ADULT. - ADHERENCE
fair/Patient Does Follow Paleo Style Diet @Home & Trying Intermittent Fasting & Does Take Multiple Vitamin Supplements @Home

## 2019-06-12 NOTE — PROGRESS NOTE ADULT - SUBJECTIVE AND OBJECTIVE BOX
TODAY'S SUBJECTIVE &  REVIEW OF SYMPTOMS  No acute events overnight reported by the night team resident or nursing. Patient seen and evaluated at the bedside. Patient doing well, he is without fever, no chest pain. No issues with bladder. Has constipation for 2 days. Weakness in the right arm, numbness in the hand.     [X] Constitutional WNL       [X] HEENT   [X] Cardio WNL              [X] Resp WNL            [X] GI + consitpation.   [X]  WNL                               [X] MSK WNL            [X] Neuro WNL                     [X] Cognitive WNL   [X] Psych WNL  [X] Skin WNL        CURRENT MEDICATIONS  MEDICATIONS  (STANDING):  amLODIPine   Tablet 10 milliGRAM(s) Oral daily  aspirin 325 milliGRAM(s) Oral daily  atorvastatin 40 milliGRAM(s) Oral at bedtime  docusate sodium 100 milliGRAM(s) Oral three times a day  losartan 100 milliGRAM(s) Oral daily  pantoprazole    Tablet 40 milliGRAM(s) Oral before breakfast  polyethylene glycol 3350 17 Gram(s) Oral daily  senna 2 Tablet(s) Oral at bedtime    MEDICATIONS  (PRN):      VITALS  Vital Signs Last 24 Hrs  T(C): 36.6 (12 Jun 2019 07:52), Max: 36.6 (12 Jun 2019 07:52)  T(F): 97.9 (12 Jun 2019 07:52), Max: 97.9 (12 Jun 2019 07:52)  HR: 62 (12 Jun 2019 07:52) (62 - 64)  BP: 144/81 (12 Jun 2019 07:52) (132/72 - 190/90)  BP(mean): --  RR: 14 (12 Jun 2019 07:52) (14 - 14)  SpO2: 99% (12 Jun 2019 07:52) (95% - 99%)      PHYSICAL EXAM  General- NAD, Comfortable                                                                                    HEENT - NCAT, EOMI  Cardio- RRR, S1S2, No murmurs                                                                           Resp- CTA bilaterally, No wheeze / rhonchi / Rales  Abdomen - BS+, Soft, NTND                                                                                    Extremities - No C/C/E, No calf tenderness   Skin: Intact                                                                                                                    Neuro                   Cognitive - Awake, Alert, AAO x 3                                                                          Communication - Fluent, +dysarthria  Cranial Nerves - CN VII impaired on the right side                                                                         Sensory - Intact to LT       MSK - wnl   Motor - No focal Deficits                                                                                      Psychiatric - Mood stable, Affect WNL      RECENT LABS                        13.8   4.12  )-----------( 155      ( 12 Jun 2019 05:30 )             39.7     06-12    137  |  101  |  20  ----------------------------<  143<H>  3.8   |  30  |  1.09    Ca    9.5      12 Jun 2019 05:30    TPro  6.5  /  Alb  3.3  /  TBili  2.2<H>  /  DBili  x   /  AST  15  /  ALT  19  /  AlkPhos  55  06-12            RADIOLOGY/OTHER RESULTS

## 2019-06-13 PROCEDURE — 99255 IP/OBS CONSLTJ NEW/EST HI 80: CPT

## 2019-06-13 PROCEDURE — 99232 SBSQ HOSP IP/OBS MODERATE 35: CPT

## 2019-06-13 RX ORDER — HYDRALAZINE HCL 50 MG
25 TABLET ORAL ONCE
Refills: 0 | Status: COMPLETED | OUTPATIENT
Start: 2019-06-13 | End: 2019-06-13

## 2019-06-13 RX ORDER — HYDRALAZINE HCL 50 MG
50 TABLET ORAL
Refills: 0 | Status: DISCONTINUED | OUTPATIENT
Start: 2019-06-13 | End: 2019-06-15

## 2019-06-13 RX ORDER — HYDRALAZINE HCL 50 MG
25 TABLET ORAL
Refills: 0 | Status: DISCONTINUED | OUTPATIENT
Start: 2019-06-13 | End: 2019-06-13

## 2019-06-13 RX ADMIN — Medication 50 MILLIGRAM(S): at 17:33

## 2019-06-13 RX ADMIN — PANTOPRAZOLE SODIUM 40 MILLIGRAM(S): 20 TABLET, DELAYED RELEASE ORAL at 05:55

## 2019-06-13 RX ADMIN — Medication 25 MILLIGRAM(S): at 11:28

## 2019-06-13 RX ADMIN — LOSARTAN POTASSIUM 100 MILLIGRAM(S): 100 TABLET, FILM COATED ORAL at 05:55

## 2019-06-13 RX ADMIN — Medication 325 MILLIGRAM(S): at 11:29

## 2019-06-13 RX ADMIN — POLYETHYLENE GLYCOL 3350 17 GRAM(S): 17 POWDER, FOR SOLUTION ORAL at 11:29

## 2019-06-13 RX ADMIN — ATORVASTATIN CALCIUM 40 MILLIGRAM(S): 80 TABLET, FILM COATED ORAL at 21:12

## 2019-06-13 RX ADMIN — AMLODIPINE BESYLATE 10 MILLIGRAM(S): 2.5 TABLET ORAL at 05:55

## 2019-06-13 RX ADMIN — Medication 100 MILLIGRAM(S): at 21:12

## 2019-06-13 RX ADMIN — SENNA PLUS 2 TABLET(S): 8.6 TABLET ORAL at 21:12

## 2019-06-13 RX ADMIN — Medication 25 MILLIGRAM(S): at 08:54

## 2019-06-13 RX ADMIN — Medication 100 MILLIGRAM(S): at 05:55

## 2019-06-13 RX ADMIN — Medication 100 MILLIGRAM(S): at 14:58

## 2019-06-13 NOTE — PROGRESS NOTE ADULT - SUBJECTIVE AND OBJECTIVE BOX
TODAY'S SUBJECTIVE &  REVIEW OF SYMPTOMS  No acute events overnight reported by the night team resident or nursing. Patient seen and evaluated in therapy. BP elevated, patient was asymtomatic, no headache, blurry vision.     [X] Constitutional WNL       [X] HEENT   [X] Cardio WNL              [X] Resp WNL            [X] GI + consitpation.   [X]  WNL                               [X] MSK WNL            [X] Neuro + deficits.   [X] Cognitive +nild deficits.           MEDICATIONS  (STANDING):  amLODIPine   Tablet 10 milliGRAM(s) Oral daily  aspirin 325 milliGRAM(s) Oral daily  atorvastatin 40 milliGRAM(s) Oral at bedtime  docusate sodium 100 milliGRAM(s) Oral three times a day  hydrALAZINE 25 milliGRAM(s) Oral once  hydrALAZINE 50 milliGRAM(s) Oral two times a day  losartan 100 milliGRAM(s) Oral daily  pantoprazole    Tablet 40 milliGRAM(s) Oral before breakfast  polyethylene glycol 3350 17 Gram(s) Oral daily  senna 2 Tablet(s) Oral at bedtime    MEDICATIONS  (PRN):        VITALS  Vital Signs Last 24 Hrs  T(C): 36.8 (13 Jun 2019 08:07), Max: 36.8 (12 Jun 2019 20:38)  T(F): 98.3 (13 Jun 2019 08:07), Max: 98.3 (13 Jun 2019 08:07)  HR: 77 (13 Jun 2019 08:07) (61 - 77)  BP: 161/91 (13 Jun 2019 08:07) (161/91 - 178/86)  BP(mean): --  RR: 14 (13 Jun 2019 08:07) (14 - 14)  SpO2: 97% (13 Jun 2019 08:07) (97% - 98%)    PHYSICAL EXAM  General- NAD, Comfortable                                                                                    HEENT - NCAT, EOMI  Cardio- RRR, S1S2, No murmurs                                                                           Resp- CTA bilaterally, No wheeze / rhonchi / Rales  Abdomen - BS+, Soft, NTND                                                                                    Extremities - No C/C/E, No calf tenderness   Skin: Intact                                                                                                                    Neuro                   Cognitive - Awake, Alert, AAO x 3                                                                          Communication - Fluent, +dysarthria  Cranial Nerves - CN VII impaired on the right side                                                                         Sensory - Intact to LT       MSK - wnl   Motor - No focal Deficits                                                                                      Psychiatric - Mood stable, Affect WNL      RECENT LABS                          13.8   4.12  )-----------( 155      ( 12 Jun 2019 05:30 )             39.7     06-12    137  |  101  |  20  ----------------------------<  143<H>  3.8   |  30  |  1.09    Ca    9.5      12 Jun 2019 05:30    TPro  6.5  /  Alb  3.3  /  TBili  2.2<H>  /  DBili  x   /  AST  15  /  ALT  19  /  AlkPhos  55  06-12            RADIOLOGY/OTHER RESULTS

## 2019-06-13 NOTE — CONSULT NOTE ADULT - SUBJECTIVE AND OBJECTIVE BOX
Patient is a 63 year old male who presents with a chief complaint of functional deficits (12 Jun 2019 15:28).    HPI: 63 year old male with pmhx of HTN (poorly controlled despite reported compliance with bp meds), DM type 2 who presented to the ER with right facial droop and difficulty speaking. Patients reports that in he felt off the evening before when visiting with his grandchildren. Patient then developed right facial droop and dysarthria the next morning around 10 am.  Denied paresthesias weakness, dizziness, vision loss, LOC, chest pain, SOB, N/V/D, fever, and urinary symptoms.   In the ER patient was code stroke, CTA brain negative for intracranial hemorrhage and involutional and ischemic gliotic changes. Patient received tPA and labetelol. BP of 235/108 was reduced to 156/75.   Patient admitted to ICU, no events on tele for 24 hours as per icu attending, Dr. Tafoya. Patient evaluated by neurology, and speech therapy (will need speech therapy and dsyphagia diet). OT/PT recommend acute rehab. Patient can tolerate and benefit from acute rehab. Tolerating slightly high bp for perfusion s/p stroke , continue to monitor and increase or add other bp agents as needed. Increased norvasc to 10mg        MEDICATIONS  (STANDING):  amLODIPine   Tablet 10 milliGRAM(s) Oral daily  aspirin 325 milliGRAM(s) Oral daily  atorvastatin 40 milliGRAM(s) Oral at bedtime  docusate sodium 100 milliGRAM(s) Oral three times a day  losartan 100 milliGRAM(s) Oral daily  pantoprazole    Tablet 40 milliGRAM(s) Oral before breakfast  polyethylene glycol 3350 17 Gram(s) Oral daily  senna 2 Tablet(s) Oral at bedtime          REVIEW OF SYSTEMS:  CONSTITUTIONAL: No fever, weight loss, or fatigue  EYES: No eye pain, visual disturbances, or discharge  ENMT:  No difficulty hearing, tinnitus, vertigo; No sinus or throat pain  NECK: No pain or stiffness  RESPIRATORY: No cough, wheezing, chills or hemoptysis; No shortness of breath  CARDIOVASCULAR: No chest pain, palpitations, dizziness, or leg swelling  GASTROINTESTINAL: No abdominal or epigastric pain. No nausea, vomiting, or hematemesis; No diarrhea or constipation. No melena or hematochezia.  GENITOURINARY: No dysuria, frequency, hematuria, or incontinence  NEUROLOGICAL: No headaches, memory loss, loss of strength, numbness, or tremors  SKIN: No itching, burning, rashes, or lesions   LYMPH NODES: No enlarged glands  ENDOCRINE: No heat or cold intolerance; No hair loss  MUSCULOSKELETAL: No joint pain or swelling; No muscle, back, or extremity pain  PSYCHIATRIC: No depression, anxiety, mood swings, or difficulty sleeping  HEME/LYMPH: No easy bruising, or bleeding gums  ALLERY AND IMMUNOLOGIC: No hives or eczema    PHYSICAL EXAM:    T(C): 36.8 (06-12-19 @ 20:38), Max: 36.8 (06-12-19 @ 20:38)  HR: 61 (06-13-19 @ 05:53) (61 - 62)  BP: 176/88 (06-13-19 @ 05:53) (144/81 - 178/86)  RR: 14 (06-12-19 @ 20:38) (14 - 14)  SpO2: 98% (06-12-19 @ 20:38) (98% - 99%)  Wt(kg): --  I&O's Summary      GENERAL: NAD, well-groomed, well-developed  HEAD:  Atraumatic, Normocephalic  EYES: EOMI, PERRL, conjunctiva and sclera clear  ENMT: No tonsillar erythema, exudates, or enlargement; Moist mucous membranes, Good dentition, No lesions  NECK: Supple, No JVD, Normal thyroid  NERVOUS SYSTEM:  Alert & Oriented X3, Good concentration; Motor Strength 5/5 B/L upper and lower extremities; DTRs 2+ intact and symmetric  CHEST/LUNG: Clear to ascultation bilaterally; No rales, rhonchi, wheezing, or rubs  HEART: Regular rate and rhythm; No murmurs, rubs, or gallops  ABDOMEN: Soft, Nontender, Nondistended; Bowel sounds present  EXTREMITIES:  2+ Peripheral Pulses, No clubbing, cyanosis, or edema  LYMPH: No lymphadenopathy noted  SKIN: No rashes or lesions    LABS:                        13.8   4.12  )-----------( 155      ( 12 Jun 2019 05:30 )             39.7     06-12    137  |  101  |  20  ----------------------------<  143<H>  3.8   |  30  |  1.09    Ca    9.5      12 Jun 2019 05:30    TPro  6.5  /  Alb  3.3  /  TBili  2.2<H>  /  DBili  x   /  AST  15  /  ALT  19  /  AlkPhos  55  06-12    RADIOLOGY & ADDITIONAL TESTS:    Consultant(s) Notes Reviewed:  [x] YES  [ ] NO    Care Discussed with Consultants/Other Providers [x] YES  [ ] NO Patient is a 63 year old male who presents with a chief complaint of functional deficits (12 Jun 2019 15:28).    HPI: 63 year old male with pmhx of HTN (poorly controlled despite reported compliance with bp meds), DM type 2 who presented to the ER with right facial droop and difficulty speaking. Patients reports that in he felt off the evening before when visiting with his grandchildren. Patient then developed right facial droop and dysarthria the next morning around 10 am.  Denied paresthesias weakness, dizziness, vision loss, LOC, chest pain, SOB, N/V/D, fever, and urinary symptoms.   In the ER patient was code stroke, CTA brain negative for intracranial hemorrhage and involutional and ischemic gliotic changes. Patient received tPA and labetelol. BP of 235/108 was reduced to 156/75.   Patient admitted to ICU, no events on tele for 24 hours as per icu attending, Dr. Tafoya. Patient evaluated by neurology, and speech therapy (will need speech therapy and dsyphagia diet). OT/PT recommend acute rehab. Patient can tolerate and benefit from acute rehab. Tolerating slightly high bp for perfusion s/p stroke, continue to monitor and increase or add other bp agents as needed. Increased norvasc to 10mg    PAST MEDICAL HISTORY:  DM (diabetes mellitus)   HTN (hypertension).     PAST SURGICAL HISTORY:  No significant past surgical history.    FAMILY HISTORY:      SOCIAL HISTORY:   Smoking - Former, quit 40yrs ago  EtOH - Social  Drugs - Social marijuana use  Lives with wife in [place] [facility], [place]: Independent in ADLs and ambulation	      ALLERGIES: No Known Allergies    MEDICATIONS  (STANDING):  amLODIPine   Tablet 10 milliGRAM(s) Oral daily  aspirin 325 milliGRAM(s) Oral daily  atorvastatin 40 milliGRAM(s) Oral at bedtime  docusate sodium 100 milliGRAM(s) Oral three times a day  losartan 100 milliGRAM(s) Oral daily  pantoprazole    Tablet 40 milliGRAM(s) Oral before breakfast  polyethylene glycol 3350 17 Gram(s) Oral daily  senna 2 Tablet(s) Oral at bedtime      REVIEW OF SYSTEMS:  CONSTITUTIONAL: No fever, weight loss, has fatigue  EYES: No eye pain, visual disturbances, or discharge  ENMT:  No difficulty hearing, tinnitus, vertigo; No sinus or throat pain  NECK: No pain or stiffness  RESPIRATORY: No cough, wheezing, chills or hemoptysis; No shortness of breath  CARDIOVASCULAR: No chest pain, palpitations, dizziness, or leg swelling  GASTROINTESTINAL: No abdominal or epigastric pain. No nausea, vomiting, or hematemesis; No diarrhea, has constipation. No melena or hematochezia.  GENITOURINARY: No dysuria, frequency, hematuria, or incontinence  NEUROLOGICAL: Has dysarthria, RUE weakness, No headaches, memory loss,  numbness, or tremors  SKIN: No itching, burning, rashes, or lesions   LYMPH NODES: No enlarged glands  ENDOCRINE: No heat or cold intolerance; No hair loss  MUSCULOSKELETAL: No joint pain or swelling; No muscle, back, or extremity pain  PSYCHIATRIC: depression, anxiety  HEME/LYMPH: No easy bruising, or bleeding gums  ALLERGY AND IMMUNOLOGIC: No hives or eczema    PHYSICAL EXAM:  T(C): 36.8 (06-12-19 @ 20:38), Max: 36.8 (06-12-19 @ 20:38)  HR: 61 (06-13-19 @ 05:53) (61 - 62)  BP: 176/88 (06-13-19 @ 05:53) (144/81 - 178/86)  RR: 14 (06-12-19 @ 20:38) (14 - 14)  SpO2: 98% (06-12-19 @ 20:38) (98% - 99%)      GENERAL: NAD, comfortable  HEAD:  Atraumatic, Normocephalic  EYES: EOMI, PERRL, conjunctiva and sclera clear  ENMT: No tonsillar erythema, exudates, or enlargement; Moist mucous membranes, Good dentition, No lesions  NECK: Supple, No JVD, Normal thyroid  NERVOUS SYSTEM:  Alert & Oriented X3, dysarthria, Good concentration; right facial droop, Motor Strength 5/5 B/L upper and lower extremities; DTRs 2+ intact and symmetric  CHEST/LUNG: Clear to ascultation bilaterally; No rales, rhonchi, wheezing, or rubs  HEART: Regular rate and rhythm; No murmurs, rubs, or gallops  ABDOMEN: Soft, Nontender, Nondistended; Bowel sounds present  EXTREMITIES:  2+ Peripheral Pulses, No clubbing, cyanosis, or edema  SKIN: No rash      LABS:                        13.8   4.12  )-----------( 155      ( 12 Jun 2019 05:30 )             39.7     06-12    137  |  101  |  20  ----------------------------<  143<H>  3.8   |  30  |  1.09    Ca    9.5      12 Jun 2019 05:30    TPro  6.5  /  Alb  3.3  /  TBili  2.2<H>  /  DBili  x   /  AST  15  /  ALT  19  /  AlkPhos  55  06-12    RADIOLOGY & ADDITIONAL TESTS:    Consultant(s) Notes Reviewed:  [x] YES  [ ] NO    Care Discussed with Consultants/Other Providers [x] YES  [ ] NO Patient is a 63 year old male who presents with a chief complaint of functional deficits (12 Jun 2019 15:28).    HPI: 63 year old male with pmhx of HTN (poorly controlled despite reported compliance with bp meds), DM type 2 who presented to the ER with right facial droop and difficulty speaking. Patients reports that in he felt off the evening before when visiting with his grandchildren. Patient then developed right facial droop and dysarthria the next morning around 10 am.  Denied paresthesias weakness, dizziness, vision loss, LOC, chest pain, SOB, N/V/D, fever, and urinary symptoms.   In the ER patient was code stroke, CTA brain negative for intracranial hemorrhage and involutional and ischemic gliotic changes. Patient received tPA and labetelol. BP of 235/108 was reduced to 156/75.   Patient admitted to ICU, no events on tele for 24 hours as per icu attending, Dr. Tafoya. Patient evaluated by neurology, and speech therapy (will need speech therapy and dsyphagia diet). OT/PT recommend acute rehab. Patient can tolerate and benefit from acute rehab. Tolerating slightly high bp for perfusion s/p stroke, continue to monitor and increase or add other bp agents as needed. Increased norvasc to 10mg    PAST MEDICAL HISTORY:  DM (diabetes mellitus)   HTN (hypertension).     PAST SURGICAL HISTORY:  No significant past surgical history.    FAMILY HISTORY:  Patient reports his of stroke in his mother      SOCIAL HISTORY:   Smoking - Former, quit 40 years ago  EtOH - Social  Drugs - Social marijuana use  [facility]: Independent in ADLs and ambulation	      ALLERGIES: No Known Allergies    MEDICATIONS  (STANDING):  amLODIPine   Tablet 10 milliGRAM(s) Oral daily  aspirin 325 milliGRAM(s) Oral daily  atorvastatin 40 milliGRAM(s) Oral at bedtime  docusate sodium 100 milliGRAM(s) Oral three times a day  losartan 100 milliGRAM(s) Oral daily  pantoprazole    Tablet 40 milliGRAM(s) Oral before breakfast  polyethylene glycol 3350 17 Gram(s) Oral daily  senna 2 Tablet(s) Oral at bedtime      REVIEW OF SYSTEMS:  CONSTITUTIONAL: No fever, weight loss, has fatigue  EYES: No eye pain, visual disturbances, or discharge  ENMT:  No difficulty hearing, tinnitus, vertigo; No sinus or throat pain  NECK: No pain or stiffness  RESPIRATORY: No cough, wheezing, chills or hemoptysis; No shortness of breath  CARDIOVASCULAR: No chest pain, palpitations, dizziness, or leg swelling  GASTROINTESTINAL: No abdominal or epigastric pain. No nausea, vomiting, or hematemesis; No diarrhea, has constipation. No melena or hematochezia.  GENITOURINARY: No dysuria, frequency, hematuria, or incontinence  NEUROLOGICAL: Has dysarthria, RUE weakness, No headaches, memory loss,  numbness, or tremors  SKIN: No itching, burning, rashes, or lesions   LYMPH NODES: No enlarged glands  ENDOCRINE: No heat or cold intolerance; No hair loss  MUSCULOSKELETAL: No joint pain or swelling; No muscle, back, or extremity pain  PSYCHIATRIC: depression, anxiety  HEME/LYMPH: No easy bruising, or bleeding gums  ALLERGY AND IMMUNOLOGIC: No hives or eczema    PHYSICAL EXAM:  T(C): 36.8 (06-12-19 @ 20:38), Max: 36.8 (06-12-19 @ 20:38)  HR: 61 (06-13-19 @ 05:53) (61 - 62)  BP: 176/88 (06-13-19 @ 05:53) (144/81 - 178/86)  RR: 14 (06-12-19 @ 20:38) (14 - 14)  SpO2: 98% (06-12-19 @ 20:38) (98% - 99%)      GENERAL: NAD, comfortable  HEAD:  Atraumatic, Normocephalic  EYES: EOMI, PERRL, conjunctiva and sclera clear  ENMT: No tonsillar erythema, exudates, or enlargement; Moist mucous membranes, Good dentition, No lesions  NECK: Supple, No JVD, Normal thyroid  NERVOUS SYSTEM:  Alert & Oriented X3, dysarthria, Good concentration; right facial droop, Motor Strength 5/5 B/L upper and lower extremities; DTRs 2+ intact and symmetric  CHEST/LUNG: Clear to ascultation bilaterally; No rales, rhonchi, wheezing, or rubs  HEART: Regular rate and rhythm; No murmurs, rubs, or gallops  ABDOMEN: Soft, Nontender, Nondistended; Bowel sounds present  EXTREMITIES:  2+ Peripheral Pulses, No clubbing, cyanosis, or edema  SKIN: No rash      LABS:                        13.8   4.12  )-----------( 155      ( 12 Jun 2019 05:30 )             39.7     06-12    137  |  101  |  20  ----------------------------<  143<H>  3.8   |  30  |  1.09    Ca    9.5      12 Jun 2019 05:30    TPro  6.5  /  Alb  3.3  /  TBili  2.2<H>  /  DBili  x   /  AST  15  /  ALT  19  /  AlkPhos  55  06-12    RADIOLOGY & ADDITIONAL TESTS:    Consultant(s) Notes Reviewed:  [x] YES  [ ] NO    Care Discussed with Consultants/Other Providers [x] YES  [ ] NO

## 2019-06-13 NOTE — PROGRESS NOTE ADULT - ASSESSMENT
ASSESSMENT/PLAN  63 year-old Man with a PMH of HTN, T2DM, HFpEF, who was found to have an acute CVA and is now with Gait Instability, ADL impairments and Functional impairments.    COMORBIDITIES / ACTIVE MEDICAL ISSUES     Gait Instability, ADL impairments and Functional impairments 2/2 acute CVA:   - Comprehensive Rehab Program of PT/OT/SLP  - Pain Mgmt - Tylenol PRN  - GI/Bowel Mgmt -  Colace, Senna, add Miralax PRN,  - /Bladder Mgmt -  monitor     Dysarthria  - SLP eval    HTN  - Amlodipine, losartan  - added hydralazine, monitor BP     Compensated HFpEF (EF60% on 6/6/19)  - f/u cards outpt  - Cardiac meds as above    DM: A1c 5.2  - CC diet      FEN   - upgraded diet - soft + thins   - Dysphagia  SLP - treatment    Precautions / PROPHYLAXIS:   - Falls,   - Lungs: Aspiration, Incentive Spirometer (patient instructed at the bedside)  - Pressure injury/Skin: Turn Q2hrs while in bed, OOB to Chair, PT/OT    - DVT: Lovenox, SCDs, TEDs

## 2019-06-13 NOTE — CONSULT NOTE ADULT - ASSESSMENT
63 year-old man with a PMH of HTN, T2DM, HFpEF, who was found to have an acute CVA and is now with Gait Instability, ADL impairments and Functional impairments.    > Acute CVA with Gait abnormality, ADL impairments and Functional impairments  - continue ASA, statin  - Comprehensive Rehab Program of PT/OT/SLP    > Dysarthria  - SLP eval    > HTN  - Amlodipine, losartan    > Compensated HFpEF (EF 60% on 6/6/2019)  -  Cardiac meds as above  - f/u cards outpt    > DM II: A1c 5.2  - CC diet  - linagliptin    > Dysphagia  - modified diet - soft + nectar   - SLP - evaluation and treatment    > Constipation  - Colace, Senna, Miralax PRN,      > DVT ppx  - Lovenox 63 year-old man with a PMH of HTN, T2DM, HFpEF, who was found to have an acute CVA and is now with Gait Instability, ADL impairments and Functional impairments.    > Acute CVA with Gait abnormality, ADL impairments and Functional impairments  - continue ASA, statin  - Comprehensive Rehab Program of PT/OT/SLP    > Dysarthria  - SLP eval    > HTN  - Amlodipine, losartan  - monitor BP    > Compensated HFpEF (EF 60% on 6/6/2019)  -  Cardiac meds as above  - f/u cardiology    > DM II: A1c 5.2  - CC diet  - resume linagliptin    > Dysphagia  - modified diet - soft + nectar   - SLP - evaluation and treatment    > Constipation  - Colace, Senna, Miralax PRN,      > DVT ppx  - Lovenox

## 2019-06-14 DIAGNOSIS — I63.512 CEREBRAL INFARCTION DUE TO UNSPECIFIED OCCLUSION OR STENOSIS OF LEFT MIDDLE CEREBRAL ARTERY: ICD-10-CM

## 2019-06-14 DIAGNOSIS — R29.810 FACIAL WEAKNESS: ICD-10-CM

## 2019-06-14 DIAGNOSIS — R47.1 DYSARTHRIA AND ANARTHRIA: ICD-10-CM

## 2019-06-14 DIAGNOSIS — I50.32 CHRONIC DIASTOLIC (CONGESTIVE) HEART FAILURE: ICD-10-CM

## 2019-06-14 DIAGNOSIS — I11.0 HYPERTENSIVE HEART DISEASE WITH HEART FAILURE: ICD-10-CM

## 2019-06-14 DIAGNOSIS — R13.12 DYSPHAGIA, OROPHARYNGEAL PHASE: ICD-10-CM

## 2019-06-14 DIAGNOSIS — E11.9 TYPE 2 DIABETES MELLITUS WITHOUT COMPLICATIONS: ICD-10-CM

## 2019-06-14 DIAGNOSIS — K59.01 SLOW TRANSIT CONSTIPATION: ICD-10-CM

## 2019-06-14 DIAGNOSIS — R29.703 NIHSS SCORE 3: ICD-10-CM

## 2019-06-14 PROCEDURE — 99233 SBSQ HOSP IP/OBS HIGH 50: CPT

## 2019-06-14 PROCEDURE — 99232 SBSQ HOSP IP/OBS MODERATE 35: CPT

## 2019-06-14 RX ADMIN — PANTOPRAZOLE SODIUM 40 MILLIGRAM(S): 20 TABLET, DELAYED RELEASE ORAL at 05:40

## 2019-06-14 RX ADMIN — Medication 325 MILLIGRAM(S): at 11:38

## 2019-06-14 RX ADMIN — Medication 100 MILLIGRAM(S): at 05:40

## 2019-06-14 RX ADMIN — SENNA PLUS 2 TABLET(S): 8.6 TABLET ORAL at 21:39

## 2019-06-14 RX ADMIN — Medication 100 MILLIGRAM(S): at 21:39

## 2019-06-14 RX ADMIN — Medication 50 MILLIGRAM(S): at 05:40

## 2019-06-14 RX ADMIN — Medication 50 MILLIGRAM(S): at 17:35

## 2019-06-14 RX ADMIN — ATORVASTATIN CALCIUM 40 MILLIGRAM(S): 80 TABLET, FILM COATED ORAL at 21:39

## 2019-06-14 RX ADMIN — POLYETHYLENE GLYCOL 3350 17 GRAM(S): 17 POWDER, FOR SOLUTION ORAL at 11:38

## 2019-06-14 RX ADMIN — AMLODIPINE BESYLATE 10 MILLIGRAM(S): 2.5 TABLET ORAL at 05:40

## 2019-06-14 RX ADMIN — LOSARTAN POTASSIUM 100 MILLIGRAM(S): 100 TABLET, FILM COATED ORAL at 05:40

## 2019-06-14 NOTE — PROGRESS NOTE ADULT - ASSESSMENT
ASSESSMENT/PLAN  63 year-old Man with a PMH of HTN, T2DM, HFpEF, who was found to have an acute CVA and is now with Gait Instability, ADL impairments and Functional impairments.    COMORBIDITIES / ACTIVE MEDICAL ISSUES     Gait Instability, ADL impairments and Functional impairments 2/2 acute CVA:   - Comprehensive Rehab Program of PT/OT/SLP  - Pain Mgmt - Tylenol PRN  - GI/Bowel Mgmt -  Colace, Senna, add Miralax PRN,  - /Bladder Mgmt -  monitor     Dysarthria  - SLP eval    HTN  BP improved today  - Amlodipine, losartan  - cont. hydralazine, monitor BP     Compensated HFpEF (EF60% on 6/6/19)  - f/u cards outpt  - Cardiac meds as above    DM: A1c 5.2  - CC diet      FEN   - upgraded diet - soft + thins   - Dysphagia  SLP - treatment    Precautions / PROPHYLAXIS:   - Falls,   - Lungs: Aspiration, Incentive Spirometer (patient instructed at the bedside)  - Pressure injury/Skin: Turn Q2hrs while in bed, OOB to Chair, PT/OT    - DVT: Lovenox, SCDs, TEDs

## 2019-06-14 NOTE — PROGRESS NOTE ADULT - SUBJECTIVE AND OBJECTIVE BOX
Patient is a 63 year old  Male who presents with a chief complaint of functional deficits (13 Jun 2019 11:09)    Patient seen and examined, reports he feels better    MEDICATIONS  (STANDING):  amLODIPine   Tablet 10 milliGRAM(s) Oral daily  aspirin 325 milliGRAM(s) Oral daily  atorvastatin 40 milliGRAM(s) Oral at bedtime  docusate sodium 100 milliGRAM(s) Oral three times a day  hydrALAZINE 50 milliGRAM(s) Oral two times a day  losartan 100 milliGRAM(s) Oral daily  pantoprazole    Tablet 40 milliGRAM(s) Oral before breakfast  polyethylene glycol 3350 17 Gram(s) Oral daily  senna 2 Tablet(s) Oral at bedtime    REVIEW OF SYSTEMS:  CONSTITUTIONAL: No fever, weight loss, has fatigue  EYES: No eye pain, visual disturbances, or discharge  ENMT:  No difficulty hearing, tinnitus, vertigo; No sinus or throat pain  NECK: No pain or stiffness  RESPIRATORY: No cough, wheezing, chills or hemoptysis; No shortness of breath  CARDIOVASCULAR: No chest pain, palpitations, dizziness, or leg swelling  GASTROINTESTINAL: No abdominal or epigastric pain. No nausea, vomiting, or hematemesis; No diarrhea, has constipation. No melena or hematochezia.  GENITOURINARY: No dysuria, frequency, hematuria, or incontinence  NEUROLOGICAL: Has dysarthria, RUE weakness, No headaches, memory loss,  numbness, or tremors  SKIN: No itching, burning, rashes, or lesions   ENDOCRINE: No heat or cold intolerance; No hair loss  MUSCULOSKELETAL: No joint pain or swelling; No muscle, back, or extremity pain  PSYCHIATRIC: depression, anxiety  HEME/LYMPH: No easy bruising, or bleeding gums  ALLERGY AND IMMUNOLOGIC: No hives or eczema        PHYSICAL EXAM:  T(C): 36.4 (06-14-19 @ 08:07), Max: 36.8 (06-13-19 @ 20:11)  HR: 89 (06-14-19 @ 08:07) (71 - 89)  BP: 145/73 (06-14-19 @ 08:07) (138/85 - 169/82)  RR: 16 (06-14-19 @ 08:07) (16 - 16)  SpO2: 98% (06-14-19 @ 08:07) (97% - 98%)    GENERAL: NAD, comfortable  HEAD:  Atraumatic, Normocephalic  EYES: EOMI, PERRL, conjunctiva and sclera clear  ENMT: No tonsillar erythema, exudates, or enlargement; Moist mucous membranes, Good dentition, No lesions  NECK: Supple, No JVD, Normal thyroid  NERVOUS SYSTEM:  Alert & Oriented X3, dysarthria, Good concentration; right facial droop, Motor Strength 5/5 B/L upper and lower extremities; DTRs 2+ intact and symmetric  CHEST/LUNG: Clear to ascultation bilaterally; No rales, rhonchi, wheezing, or rubs  HEART: Regular rate and rhythm; No murmurs, rubs, or gallops  ABDOMEN: Soft, Nontender, Nondistended; Bowel sounds present  EXTREMITIES:  2+ Peripheral Pulses, No clubbing, cyanosis, or edema  SKIN: No rash      LABS: none today        RADIOLOGY & ADDITIONAL TESTS:      Consultant(s) Notes Reviewed:  [x] YES  [ ] NO    Care Discussed with Consultants/Other Providers [x] YES  [ ] NO

## 2019-06-14 NOTE — PROGRESS NOTE ADULT - ASSESSMENT
63 year-old man with a PMH of HTN, T2DM, HFpEF, who was found to have an acute CVA and is now with Gait Instability, ADL impairments and Functional impairments.    > Acute CVA with Gait abnormality, ADL impairments and Functional impairments  - continue ASA, statin  - Comprehensive Rehab Program of PT/OT/SLP    > Dysarthria  - SLP eval    > HTN  - Amlodipine, losartan  - monitor BP    > Compensated HFpEF (EF 60% on 6/6/2019)  -  Cardiac meds as above  - f/u cardiology    > DM II: A1c 5.2  - CC diet  - resume linagliptin    > Dysphagia  - diet per swallow eval  - SLP - evaluation and treatment    > Constipation  - Colace, Senna, Miralax PRN,      > DVT ppx  - Lovenox

## 2019-06-14 NOTE — PROGRESS NOTE ADULT - SUBJECTIVE AND OBJECTIVE BOX
HPI:  SHADE CUELLO is a 62yo Male with pmhx of HTN (poorly controlled despite reported compliance with bp meds) DM type 2 who presents to the ER with right facial droop and difficulty speaking. Patients reports that in he felt off the evening before when visiting with his grandchildren. Patient then developed right. facial droop and dysarthria the next morning around 10am.  Denied paresthesias weakness, dizziness, vision loss, LOC, chest pain, SOB, N/V/D, fever, and urinary symptoms.     In the ER patient was code stroke, CTA Brain neg for intracranial hemorrhage and involutional and ischemic gliotic changes. Patient received tPA and labetelol. BP of 235/108 was reduced to 156/75.   Pt. admitted to icu, no events on tele for 24 hours as per icu attending, Dr. Tafoya. Pt. evaluated by neuro, speech (will need speech therapy and dsyphagia diet). OT/PT recommend acute rehab,   Pt. can tolerate and benefit from acute rehab.= Tolerating slightly high bp for perfusion s/p stroke , continue to monitor and increase or add other bp agents as needed. Increased norvasc to 10mg (11 Jun 2019 16:45)      PAST MEDICAL & SURGICAL HISTORY:  DM (diabetes mellitus)  HTN (hypertension)  No significant past surgical history      Subjective:  No new complaints, slept better last night.      REVIEW OF SYMPTOMS  [X] Constitutional WNL       [X] HEENT   [X] Cardio WNL              [X] Resp WNL            [X] GI constipation  [X]  WNL                               [X] MSK WNL            [X] Neuro + deficits.   [X] Cognitive +nild deficits.       VITALS  Vital Signs Last 24 Hrs  T(C): 36.4 (14 Jun 2019 08:07), Max: 36.8 (13 Jun 2019 20:11)  T(F): 97.6 (14 Jun 2019 08:07), Max: 98.2 (13 Jun 2019 20:11)  HR: 89 (14 Jun 2019 08:07) (71 - 89)  BP: 145/73 (14 Jun 2019 08:07) (138/85 - 169/82)  BP(mean): --  RR: 16 (14 Jun 2019 08:07) (16 - 16)  SpO2: 98% (14 Jun 2019 08:07) (97% - 98%)      PHYSICAL EXAM  General- NAD, Comfortable                                                                                    HEENT - NCAT, EOMI  Cardio- RRR, S1S2, No murmurs                                                                           Resp- CTA bilaterally, No wheeze / rhonchi / Rales  Abdomen - BS+, Soft, NTND                                                                                    Extremities - No C/C/E, No calf tenderness   Skin: Intact                                                                                                                    Neuro                   Cognitive - Awake, Alert, AAO x 3                                                                          Communication - Fluent, +dysarthria  Cranial Nerves - CN VII impaired on the right side                                                                         Sensory - Intact to LT       MSK - wnl   Motor - No focal Deficits                                                                                      Psychiatric - Mood stable, Affect WNL      RECENT LABS                  RADIOLOGY/OTHER RESULTS      MEDICATIONS  (STANDING):  amLODIPine   Tablet 10 milliGRAM(s) Oral daily  aspirin 325 milliGRAM(s) Oral daily  atorvastatin 40 milliGRAM(s) Oral at bedtime  docusate sodium 100 milliGRAM(s) Oral three times a day  hydrALAZINE 50 milliGRAM(s) Oral two times a day  losartan 100 milliGRAM(s) Oral daily  pantoprazole    Tablet 40 milliGRAM(s) Oral before breakfast  polyethylene glycol 3350 17 Gram(s) Oral daily  senna 2 Tablet(s) Oral at bedtime    MEDICATIONS  (PRN):

## 2019-06-15 PROCEDURE — 99232 SBSQ HOSP IP/OBS MODERATE 35: CPT

## 2019-06-15 RX ORDER — HYDRALAZINE HCL 50 MG
50 TABLET ORAL THREE TIMES A DAY
Refills: 0 | Status: DISCONTINUED | OUTPATIENT
Start: 2019-06-15 | End: 2019-06-18

## 2019-06-15 RX ADMIN — LOSARTAN POTASSIUM 100 MILLIGRAM(S): 100 TABLET, FILM COATED ORAL at 05:32

## 2019-06-15 RX ADMIN — ATORVASTATIN CALCIUM 40 MILLIGRAM(S): 80 TABLET, FILM COATED ORAL at 21:06

## 2019-06-15 RX ADMIN — AMLODIPINE BESYLATE 10 MILLIGRAM(S): 2.5 TABLET ORAL at 05:32

## 2019-06-15 RX ADMIN — Medication 100 MILLIGRAM(S): at 05:32

## 2019-06-15 RX ADMIN — Medication 50 MILLIGRAM(S): at 05:32

## 2019-06-15 RX ADMIN — Medication 325 MILLIGRAM(S): at 12:52

## 2019-06-15 RX ADMIN — Medication 50 MILLIGRAM(S): at 21:06

## 2019-06-15 RX ADMIN — Medication 100 MILLIGRAM(S): at 21:06

## 2019-06-15 RX ADMIN — Medication 100 MILLIGRAM(S): at 14:31

## 2019-06-15 RX ADMIN — Medication 50 MILLIGRAM(S): at 14:31

## 2019-06-15 RX ADMIN — POLYETHYLENE GLYCOL 3350 17 GRAM(S): 17 POWDER, FOR SOLUTION ORAL at 12:52

## 2019-06-15 RX ADMIN — PANTOPRAZOLE SODIUM 40 MILLIGRAM(S): 20 TABLET, DELAYED RELEASE ORAL at 05:32

## 2019-06-15 NOTE — PROGRESS NOTE ADULT - SUBJECTIVE AND OBJECTIVE BOX
This is a 62yo Male with pmhx of HTN,  DM type 2(5.2%) who presents to the ER with right facial droop and difficulty speaking. Patients reports that in he felt off the evening before when visiting with his grandchildren. Patient then developed right. facial droop and dysarthria the next morning around 10am.  Denied paresthesias weakness, dizziness, vision loss, LOC, chest pain, SOB, N/V/D, fever, and urinary symptoms.     In the ER patient was code stroke, CTA Brain neg for intracranial hemorrhage and involutional and ischemic gliotic changes. Patient received tPA and labetelol. BP of 235/108 was reduced to 156/75.   Pt. admitted to icu, no events on tele for 24 hours as per icu attending, Dr. Tafoya. Pt. evaluated by neuro, speech (will need speech therapy and dsyphagia diet). OT/PT recommend acute rehab,   Pt. can tolerate and benefit from acute rehab.= Tolerating slightly high bp for perfusion s/p stroke , continue to monitor and increase or add other bp agents as needed. Increased norvasc to 10mg (11 Jun 2019 16:45)      Subjective    No new complaints      PAST MEDICAL & SURGICAL HISTORY:  DM (diabetes mellitus)  HTN (hypertension)  No significant past surgical history      MedsMEDICATIONS  (STANDING):  amLODIPine   Tablet 10 milliGRAM(s) Oral daily  aspirin 325 milliGRAM(s) Oral daily  atorvastatin 40 milliGRAM(s) Oral at bedtime  docusate sodium 100 milliGRAM(s) Oral three times a day  hydrALAZINE 50 milliGRAM(s) Oral two times a day  losartan 100 milliGRAM(s) Oral daily  pantoprazole    Tablet 40 milliGRAM(s) Oral before breakfast  polyethylene glycol 3350 17 Gram(s) Oral daily  senna 2 Tablet(s) Oral at bedtime    MEDICATIONS  (PRN):      Vital Signs Last 24 Hrs  T(C): 36.7 (15 Jay 2019 08:10), Max: 36.7 (15 Jay 2019 08:10)  T(F): 98 (15 Jay 2019 08:10), Max: 98 (15 Jay 2019 08:10)  HR: 98 (15 Jay 2019 08:10) (62 - 98)  BP: 138/81 (15 Jay 2019 08:10) (138/81 - 161/89)  BP(mean): --  RR: 14 (15 Jay 2019 08:10) (14 - 16)  SpO2: 96% (15 Jay 2019 08:10) (94% - 100%)  I&O's Summary    15 Jay 2019 07:01  -  15 Jay 2019 12:51  --------------------------------------------------------  IN: 360 mL / OUT: 0 mL / NET: 360 mL        PHYSICAL EXAM:  GENERAL: NAD  NECK: Supple  NERVOUS SYSTEM:  awake and alert  HEART: S1s2 NL , RRR  CHEST/LUNG: Clear to percussion bilaterally  ABDOMEN: Soft, Nontender, Nondistended; Bowel sounds present  EXTREMITIES:  No edema      LABS:    CAPILLARY BLOOD GLUCOSE    Imaging Personally Reviewed:  [ ] YES  [ ] NO      HEALTH ISSUES - PROBLEM Dx:    CVA  PT/OT per rehab  ASA(?reason for 325mg dose), Statin  ECHO nl EF but done w/o bubble(agitation)    HTN  Cont Norvasc/Losartan/Hydralzine    DVT ppx  Lovenox          Care Discussed with Consultants/Other Providers [ x] YES  [ ] NO

## 2019-06-15 NOTE — PROGRESS NOTE ADULT - SUBJECTIVE AND OBJECTIVE BOX
Cc: Gait dysfunction    HPI: Patient with no new medical issues today.  Pain controlled, no chest pain, no N/V, no Fevers/Chills. No other new ROS  Has been tolerating rehabilitation program.    amLODIPine   Tablet 10 milliGRAM(s) Oral daily  aspirin 325 milliGRAM(s) Oral daily  atorvastatin 40 milliGRAM(s) Oral at bedtime  docusate sodium 100 milliGRAM(s) Oral three times a day  hydrALAZINE 50 milliGRAM(s) Oral two times a day  losartan 100 milliGRAM(s) Oral daily  pantoprazole    Tablet 40 milliGRAM(s) Oral before breakfast  polyethylene glycol 3350 17 Gram(s) Oral daily  senna 2 Tablet(s) Oral at bedtime      T(C): 36.7 (06-15-19 @ 08:10), Max: 36.7 (06-15-19 @ 08:10)  HR: 98 (06-15-19 @ 08:10) (62 - 98)  BP: 138/81 (06-15-19 @ 08:10) (138/81 - 161/89)  RR: 14 (06-15-19 @ 08:10) (14 - 16)  SpO2: 96% (06-15-19 @ 08:10) (94% - 100%)    In NAD  HEENT- EOMI  Heart- RRR, S1S2  Lungs- CTA bl.  Abd- + BS, NT  Ext- No calf pain  Neuro- Exam unchanged          Imp: Patient with diagnosis of CVA admitted for comprehensive acute rehabilitation.    Plan:  - Continue PT/OT/SLP  - DVT prophylaxis- SCDs  - Skin- Turn q2h, check skin daily  - Continue current medications; patient medically stable.   - Patient is stable to continue current rehabilitation program.

## 2019-06-16 PROCEDURE — 99232 SBSQ HOSP IP/OBS MODERATE 35: CPT

## 2019-06-16 RX ADMIN — Medication 325 MILLIGRAM(S): at 11:50

## 2019-06-16 RX ADMIN — Medication 100 MILLIGRAM(S): at 14:38

## 2019-06-16 RX ADMIN — Medication 100 MILLIGRAM(S): at 05:29

## 2019-06-16 RX ADMIN — ATORVASTATIN CALCIUM 40 MILLIGRAM(S): 80 TABLET, FILM COATED ORAL at 21:09

## 2019-06-16 RX ADMIN — POLYETHYLENE GLYCOL 3350 17 GRAM(S): 17 POWDER, FOR SOLUTION ORAL at 11:50

## 2019-06-16 RX ADMIN — PANTOPRAZOLE SODIUM 40 MILLIGRAM(S): 20 TABLET, DELAYED RELEASE ORAL at 05:28

## 2019-06-16 RX ADMIN — SENNA PLUS 2 TABLET(S): 8.6 TABLET ORAL at 21:09

## 2019-06-16 RX ADMIN — Medication 100 MILLIGRAM(S): at 21:09

## 2019-06-16 RX ADMIN — Medication 50 MILLIGRAM(S): at 05:29

## 2019-06-16 RX ADMIN — AMLODIPINE BESYLATE 10 MILLIGRAM(S): 2.5 TABLET ORAL at 05:28

## 2019-06-16 RX ADMIN — Medication 50 MILLIGRAM(S): at 21:09

## 2019-06-16 RX ADMIN — LOSARTAN POTASSIUM 100 MILLIGRAM(S): 100 TABLET, FILM COATED ORAL at 05:28

## 2019-06-16 RX ADMIN — Medication 50 MILLIGRAM(S): at 14:38

## 2019-06-16 NOTE — PROGRESS NOTE ADULT - SUBJECTIVE AND OBJECTIVE BOX
Cc: Gait dysfunction    HPI: Patient with no new medical issues today.  Pain controlled, no chest pain, no N/V, no Fevers/Chills. No other new ROS  Has been tolerating rehabilitation program.  Appreciate Hospitalist f/u.    MEDICATIONS  (STANDING):  amLODIPine   Tablet 10 milliGRAM(s) Oral daily  aspirin 325 milliGRAM(s) Oral daily  atorvastatin 40 milliGRAM(s) Oral at bedtime  docusate sodium 100 milliGRAM(s) Oral three times a day  hydrALAZINE 50 milliGRAM(s) Oral three times a day  losartan 100 milliGRAM(s) Oral daily  pantoprazole    Tablet 40 milliGRAM(s) Oral before breakfast  polyethylene glycol 3350 17 Gram(s) Oral daily  senna 2 Tablet(s) Oral at bedtime    MEDICATIONS  (PRN):    Vital Signs Last 24 Hrs  T(C): 36.4 (16 Jun 2019 07:41), Max: 36.7 (15 Jay 2019 08:10)  T(F): 97.6 (16 Jun 2019 07:41), Max: 98 (15 Jay 2019 08:10)  HR: 87 (16 Jun 2019 07:41) (80 - 98)  BP: 119/75 (16 Jun 2019 07:41) (119/75 - 162/76)  BP(mean): --  RR: 14 (16 Jun 2019 07:41) (14 - 14)  SpO2: 98% (16 Jun 2019 07:41) (96% - 98%)      In NAD  HEENT- EOMI  Heart- RRR, S1S2  Lungs- CTA bl.  Abd- + BS, NT  Ext- No calf pain  Neuro- Exam unchanged    Imp: Patient with diagnosis of CVA admitted for comprehensive acute rehabilitation.    Plan:  - Continue PT/OT/SLP  - DVT prophylaxis- SCDs  - Skin- Turn q2h, check skin daily  - Continue current medications; patient medically stable.   - Patient is stable to continue current rehabilitation program.

## 2019-06-17 PROCEDURE — 99233 SBSQ HOSP IP/OBS HIGH 50: CPT

## 2019-06-17 PROCEDURE — 99232 SBSQ HOSP IP/OBS MODERATE 35: CPT

## 2019-06-17 RX ADMIN — PANTOPRAZOLE SODIUM 40 MILLIGRAM(S): 20 TABLET, DELAYED RELEASE ORAL at 05:52

## 2019-06-17 RX ADMIN — ATORVASTATIN CALCIUM 40 MILLIGRAM(S): 80 TABLET, FILM COATED ORAL at 21:16

## 2019-06-17 RX ADMIN — Medication 50 MILLIGRAM(S): at 13:11

## 2019-06-17 RX ADMIN — Medication 50 MILLIGRAM(S): at 05:52

## 2019-06-17 RX ADMIN — Medication 325 MILLIGRAM(S): at 11:51

## 2019-06-17 RX ADMIN — Medication 50 MILLIGRAM(S): at 21:16

## 2019-06-17 RX ADMIN — AMLODIPINE BESYLATE 10 MILLIGRAM(S): 2.5 TABLET ORAL at 05:52

## 2019-06-17 RX ADMIN — LOSARTAN POTASSIUM 100 MILLIGRAM(S): 100 TABLET, FILM COATED ORAL at 05:52

## 2019-06-17 RX ADMIN — Medication 100 MILLIGRAM(S): at 05:52

## 2019-06-17 NOTE — PROGRESS NOTE ADULT - ASSESSMENT
ASSESSMENT/PLAN  63 year-old Man with a PMH of HTN, T2DM, HFpEF, who was found to have an acute CVA and is now with Gait Instability, ADL impairments and Functional impairments.    COMORBIDITIES / ACTIVE MEDICAL ISSUES     Gait Instability, ADL impairments and Functional impairments 2/2 acute CVA:   - Comprehensive Rehab Program of PT/OT/SLP  - Pain Mgmt - Tylenol PRN  - GI/Bowel Mgmt -  Colace, Senna, add Miralax PRN,  - /Bladder Mgmt -  monitor     Dysarthria / dysphagia  - SLP   - add vital stim     HTN  BP improved today  - Amlodipine, losartan  - cont. hydralazine, monitor BP     Compensated HFpEF (EF60% on 6/6/19)  - f/u cards outpt  - Cardiac meds as above    DM: A1c 5.2  - CC diet    FEN   - upgraded diet - soft + thins   - Dysphagia  SLP - treatment    Precautions / PROPHYLAXIS:   - Falls,   - Lungs: Aspiration, Incentive Spirometer (patient instructed at the bedside)  - Pressure injury/Skin: Turn Q2hrs while in bed, OOB to Chair, PT/OT    - DVT: Lovenox, SCDs, TEDs

## 2019-06-17 NOTE — PROGRESS NOTE ADULT - SUBJECTIVE AND OBJECTIVE BOX
Patient is a 63y old  Male who presents with a chief complaint of functional deficits (16 Jun 2019 07:47)      Patient seen and examined at bedside, no events overnight, in no acute distress.     ALLERGIES:  penicillin (Swelling)    MEDICATIONS:  hydrALAZINE 50 milliGRAM(s) Oral three times a day    Vital Signs Last 24 Hrs  T(C): 36.6 (17 Jun 2019 07:38), Max: 36.6 (17 Jun 2019 07:38)  T(F): 97.9 (17 Jun 2019 07:38), Max: 97.9 (17 Jun 2019 07:38)  HR: 88 (17 Jun 2019 07:38) (68 - 88)  BP: 124/60 (17 Jun 2019 07:38) (124/60 - 158/80)  BP(mean): --  RR: 14 (17 Jun 2019 07:38) (14 - 16)  SpO2: 99% (17 Jun 2019 07:38) (99% - 99%)  I&O's Summary    16 Jun 2019 07:01  -  17 Jun 2019 07:00  --------------------------------------------------------  IN: 840 mL / OUT: 0 mL / NET: 840 mL      PAST MEDICAL & SURGICAL HISTORY:  DM (diabetes mellitus)  HTN (hypertension)  No significant past surgical history      Home Medications:  amLODIPine 10 mg oral tablet: 1 tab(s) orally once a day (10 Jay 2019 12:59)  aspirin 325 mg oral tablet: 1 tab(s) orally once a day (07 Jun 2019 10:33)  atorvastatin 40 mg oral tablet: 1 tab(s) orally once a day (at bedtime) (07 Jun 2019 10:35)  Bystolic 10 mg oral tablet: 1 tab(s) orally once a day (05 Jun 2019 13:46)  docusate sodium 100 mg oral capsule: 1 cap(s) orally 3 times a day (10 Jay 2019 12:59)  polyethylene glycol 3350 oral powder for reconstitution: 17 gram(s) orally once a day (10 Jay 2019 12:59)  senna oral tablet: 2 tab(s) orally once a day (at bedtime) (10 Jay 2019 12:59)      PHYSICAL EXAM:  General: NAD  ENT: MMM  Neck: Supple, No JVD  Lungs: Clear to percussion bilaterally  Cardio: RRR, S1/S2, No murmurs  Abdomen: Soft, Nontender, Nondistended; Bowel sounds present  Neuro: no new deficits   Extremities: No clubbing, cyanosis, or edema    LABS: no new labs     06-06 Chol 161 mg/dL LDL 95 mg/dL HDL 54 mg/dL Trig 59 mg/dL    06-06 [id number] 5.2    RADIOLOGY & ADDITIONAL TESTS: no new tests     Care Discussed with Consultants/Other Providers: PM&R team

## 2019-06-17 NOTE — PROGRESS NOTE ADULT - ASSESSMENT
63 year-old man with a PMH of HTN, T2DM, HFpEF, who was found to have an acute CVA and is now with Gait Instability, ADL impairments and Functional impairments.    # Acute CVA with Gait abnormality, ADL impairments and Functional impairments  - continue ASA, statin  - Comprehensive Rehab Program of PT/OT/SLP    # Dysarthria  - SLP eval    # HTN  - Amlodipine, losartan  - monitor BP    # Compensated HFpEF (EF 60% on 6/6/2019)  -  Cardiac meds as above  - f/u cardiology    # T2DMI: A1c 5.2  - CC diet  - resume linagliptin    # Dysphagia  - diet per swallow eval  - SLP - evaluation and treatment    # Constipation  - Colace, Senna, Miralax PRN,    # DVT ppx  - Lovenox

## 2019-06-17 NOTE — CHART NOTE - NSCHARTNOTEFT_GEN_A_CORE
Nutrition Follow Up Note  Hospital Course   (Per Electronic Medical Record):     Source:   Patient [X]  Medical Record [X]      Diet:   Consistent Carbohydrate DASH-TLC Soft (Dysphagia 3) Diet w/ Thin Liquids  Tolerates Diet Well  No Chewing/Swallowing Difficulties  No Recent Nausea, Vomiting, Diarrhea or Constipation  Consumes 100% of Meals (as Per Documentation)     Enteral/Parenteral Nutrition: N/A    Current Weight: 241.4lb on 6/13  Obtain Weights Weekly  Weights Currently Stable @This Time     Pertinent Medications: MEDICATIONS  (STANDING):  amLODIPine   Tablet 10 milliGRAM(s) Oral daily  aspirin 325 milliGRAM(s) Oral daily  atorvastatin 40 milliGRAM(s) Oral at bedtime  docusate sodium 100 milliGRAM(s) Oral three times a day  hydrALAZINE 50 milliGRAM(s) Oral three times a day  losartan 100 milliGRAM(s) Oral daily  pantoprazole    Tablet 40 milliGRAM(s) Oral before breakfast  polyethylene glycol 3350 17 Gram(s) Oral daily  senna 2 Tablet(s) Oral at bedtime    MEDICATIONS  (PRN):    Pertinent Labs:   06-12 Alb 3.3 g/dL 06-06 [id number] 5.2 % 06-06 Chol 161 mg/dL LDL 95 mg/dL HDL 54 mg/dL Trig 59 mg/dL    Skin: No Pressure Ulcers     Edema: None Noted     Last BM: on 6/15    Estimated Needs:   [X] No Change Since Previous Assessment    Previous Nutrition Diagnosis:   Obese  Chewing/Swallowing Difficulties     Nutrition Diagnosis is [X] Ongoing - Obese & Chewing Difficulties   Continues on Soft (Dysphagia 3) Diet     [X] Resolved - Swallowing Difficulties   Updated to Thin Liquids     New Nutrition Diagnosis: [X] Not Applicable    Interventions:   1. Recommend Continue Nutrition Plan of Care     Monitoring & Evaluation:   [X] Weights   [X] PO Intake   [X] Follow Up (Per Protocol)  [X] Tolerance to Diet Prescription   [X] Other: Labs & POCT    Registered Dietitian/Nutritionist Remains Available.  Matthew Ontiveros RDN    Pager # [phone]  Phone# [phone]

## 2019-06-17 NOTE — PROGRESS NOTE ADULT - SUBJECTIVE AND OBJECTIVE BOX
HPI:  SHADE CUELLO is a 62yo Male with pmhx of HTN (poorly controlled despite reported compliance with bp meds) DM type 2 who presents to the ER with right facial droop and difficulty speaking. Patients reports that in he felt off the evening before when visiting with his grandchildren. Patient then developed right. facial droop and dysarthria the next morning around 10am.  Denied paresthesias weakness, dizziness, vision loss, LOC, chest pain, SOB, N/V/D, fever, and urinary symptoms.     In the ER patient was code stroke, CTA Brain neg for intracranial hemorrhage and involutional and ischemic gliotic changes. Patient received tPA and labetelol. BP of 235/108 was reduced to 156/75.   Pt. admitted to icu, no events on tele for 24 hours as per icu attending, Dr. Tafoya. Pt. evaluated by neuro, speech (will need speech therapy and dsyphagia diet). OT/PT recommend acute rehab,   Pt. can tolerate and benefit from acute rehab.= Tolerating slightly high bp for perfusion s/p stroke , continue to monitor and increase or add other bp agents as needed. Increased norvasc to 10mg (11 Jun 2019 16:45)      PAST MEDICAL & SURGICAL HISTORY:  DM (diabetes mellitus)  HTN (hypertension)  No significant past surgical history      Subjective: No acute events overnight reported by the night team resident or nursing. Patient seen and evaluated in therapy. Patient doing well and no new complaints.     REVIEW OF SYMPTOMS  [X] Constitutional WNL       [X] HEENT   [X] Cardio WNL              [X] Resp WNL            [X] GI constipation  [X]  WNL                               [X] MSK WNL            [X] Neuro + deficits.   [X] Cognitive mild deficits.       VITALS  Vital Signs Last 24 Hrs  T(C): 36.6 (17 Jun 2019 07:38), Max: 36.6 (17 Jun 2019 07:38)  T(F): 97.9 (17 Jun 2019 07:38), Max: 97.9 (17 Jun 2019 07:38)  HR: 88 (17 Jun 2019 07:38) (68 - 88)  BP: 124/60 (17 Jun 2019 07:38) (124/60 - 158/80)  BP(mean): --  RR: 14 (17 Jun 2019 07:38) (14 - 16)  SpO2: 99% (17 Jun 2019 07:38) (99% - 99%)      PHYSICAL EXAM  General- NAD, Comfortable                                                                                    HEENT - NCAT, EOMI  Cardio- RRR, S1S2, No murmurs                                                                           Resp- CTA bilaterally, No wheeze / rhonchi / Rales  Abdomen - BS+, Soft, NTND                                                                                    Extremities - No C/C/E, No calf tenderness   Skin: Intact                                                                                                                    Neuro                   Cognitive - Awake, Alert, AAO x 3                                                                          Communication - Fluent, +dysarthria  Cranial Nerves - CN VII impaired on the right side, but improved                                                                          Sensory - Intact to LT       MSK - wnl   Motor - No focal Deficits                                                                                      Psychiatric - Mood stable, Affect WNL      RECENT LABS                  RADIOLOGY/OTHER RESULTS      MEDICATIONS  (STANDING):  amLODIPine   Tablet 10 milliGRAM(s) Oral daily  aspirin 325 milliGRAM(s) Oral daily  atorvastatin 40 milliGRAM(s) Oral at bedtime  docusate sodium 100 milliGRAM(s) Oral three times a day  hydrALAZINE 50 milliGRAM(s) Oral three times a day  losartan 100 milliGRAM(s) Oral daily  pantoprazole    Tablet 40 milliGRAM(s) Oral before breakfast  polyethylene glycol 3350 17 Gram(s) Oral daily  senna 2 Tablet(s) Oral at bedtime    MEDICATIONS  (PRN):

## 2019-06-18 PROCEDURE — 99233 SBSQ HOSP IP/OBS HIGH 50: CPT

## 2019-06-18 PROCEDURE — 99232 SBSQ HOSP IP/OBS MODERATE 35: CPT

## 2019-06-18 RX ORDER — HYDRALAZINE HCL 50 MG
75 TABLET ORAL EVERY 8 HOURS
Refills: 0 | Status: DISCONTINUED | OUTPATIENT
Start: 2019-06-18 | End: 2019-06-19

## 2019-06-18 RX ADMIN — Medication 100 MILLIGRAM(S): at 21:17

## 2019-06-18 RX ADMIN — AMLODIPINE BESYLATE 10 MILLIGRAM(S): 2.5 TABLET ORAL at 05:20

## 2019-06-18 RX ADMIN — Medication 325 MILLIGRAM(S): at 11:29

## 2019-06-18 RX ADMIN — LOSARTAN POTASSIUM 100 MILLIGRAM(S): 100 TABLET, FILM COATED ORAL at 05:20

## 2019-06-18 RX ADMIN — Medication 75 MILLIGRAM(S): at 14:26

## 2019-06-18 RX ADMIN — SENNA PLUS 2 TABLET(S): 8.6 TABLET ORAL at 21:17

## 2019-06-18 RX ADMIN — ATORVASTATIN CALCIUM 40 MILLIGRAM(S): 80 TABLET, FILM COATED ORAL at 21:17

## 2019-06-18 RX ADMIN — Medication 50 MILLIGRAM(S): at 05:20

## 2019-06-18 RX ADMIN — PANTOPRAZOLE SODIUM 40 MILLIGRAM(S): 20 TABLET, DELAYED RELEASE ORAL at 05:20

## 2019-06-18 RX ADMIN — Medication 75 MILLIGRAM(S): at 21:17

## 2019-06-18 NOTE — PROGRESS NOTE ADULT - SUBJECTIVE AND OBJECTIVE BOX
HPI:  SHADE CUELLO is a 62yo Male with pmhx of HTN (poorly controlled despite reported compliance with bp meds) DM type 2 who presents to the ER with right facial droop and difficulty speaking. Patients reports that in he felt off the evening before when visiting with his grandchildren. Patient then developed right. facial droop and dysarthria the next morning around 10am.  Denied paresthesias weakness, dizziness, vision loss, LOC, chest pain, SOB, N/V/D, fever, and urinary symptoms.     In the ER patient was code stroke, CTA Brain neg for intracranial hemorrhage and involutional and ischemic gliotic changes. Patient received tPA and labetelol. BP of 235/108 was reduced to 156/75.   Pt. admitted to icu, no events on tele for 24 hours as per icu attending, Dr. Tafoya. Pt. evaluated by neuro, speech (will need speech therapy and dsyphagia diet). OT/PT recommend acute rehab,   Pt. can tolerate and benefit from acute rehab.= Tolerating slightly high bp for perfusion s/p stroke , continue to monitor and increase or add other bp agents as needed. Increased norvasc to 10mg (11 Jun 2019 16:45)      PAST MEDICAL & SURGICAL HISTORY:  DM (diabetes mellitus)  HTN (hypertension)  No significant past surgical history      Subjective: No acute events overnight reported by the night team resident or nursing. Patient seen and evaluated in dinning room.  Patient doing well and no new complaints. Improving in therapy, stronger voice. BM today. No issues with voiding.     REVIEW OF SYMPTOMS  [X] Constitutional WNL       [X] HEENT   [X] Cardio WNL              [X] Resp WNL            [X] GI constipation  [X]  WNL                               [X] MSK WNL            [X] Neuro + deficits.   [X] Cognitive mild deficits.       VITALS  Vital Signs Last 24 Hrs  T(C): 36.7 (18 Jun 2019 07:45), Max: 36.7 (17 Jun 2019 20:04)  T(F): 98.1 (18 Jun 2019 07:45), Max: 98.1 (18 Jun 2019 07:45)  HR: 90 (18 Jun 2019 07:45) (71 - 90)  BP: 153/85 (18 Jun 2019 07:45) (131/79 - 169/79)  BP(mean): --  RR: 14 (18 Jun 2019 07:45) (14 - 14)  SpO2: 98% (18 Jun 2019 07:45) (98% - 98%)    PHYSICAL EXAM  General- NAD, Comfortable                                                                                    HEENT - NCAT, EOMI  Cardio- RRR, S1S2, No murmurs                                                                           Resp- CTA bilaterally, No wheeze / rhonchi / Rales  Abdomen - BS+, Soft, NTND                                                                                    Extremities - No C/C/E, No calf tenderness   Skin: Intact                                                                                                                    Neuro                   Cognitive - Awake, Alert, AAO x 3                                                                          Communication - Fluent, +dysarthria  Cranial Nerves - CN VII impaired on the right side, but improved                                                                          Sensory - Intact to LT       MSK - wnl   Motor - No focal Deficits                                                                                      Psychiatric - Mood stable, Affect WNL      RECENT LABS                  RADIOLOGY/OTHER RESULTS      MEDICATIONS  (STANDING):  amLODIPine   Tablet 10 milliGRAM(s) Oral daily  aspirin 325 milliGRAM(s) Oral daily  atorvastatin 40 milliGRAM(s) Oral at bedtime  docusate sodium 100 milliGRAM(s) Oral three times a day  hydrALAZINE 50 milliGRAM(s) Oral three times a day  losartan 100 milliGRAM(s) Oral daily  pantoprazole    Tablet 40 milliGRAM(s) Oral before breakfast  polyethylene glycol 3350 17 Gram(s) Oral daily  senna 2 Tablet(s) Oral at bedtime    MEDICATIONS  (PRN):

## 2019-06-18 NOTE — PROGRESS NOTE ADULT - ASSESSMENT
63 year-old man with a PMH of HTN, T2DM, HFpEF, who was found to have an acute CVA and is now with Gait Instability, ADL impairments and Functional impairments.    > Acute CVA with Gait abnormality, ADL impairments and Functional impairments  - continue ASA, statin  - Comprehensive Rehab Program of PT/OT/SLP    > Dysarthria  - SLP eval    > HTN uncontolled  - hydralizine incr to 75 mg tid  - c/w Amlodipine 10 mg daily, losartan 100 mg daily  - monitor BP    > Compensated HFpEF (EF 60% on 6/6/2019)  -  Cardiac meds as above  - f/u cardiology    > DM II: A1c 5.2  - CC diet  - resume linagliptin    > Dysphagia  - diet per swallow eval  - SLP - evaluation and treatment    > Constipation  - Colace, Senna, Miralax PRN,    > DVT ppx  - Lovenox

## 2019-06-18 NOTE — PROGRESS NOTE ADULT - ATTENDING COMMENTS
Maintain fall, aspiration, decub precautions at all times  Jami Bennett MD
Jami Bennett MD
Pt. seen with resident.  Agree with documentation above as per resident with amendments made as appropriate. Patient medically stable.     BP uncontrolled---in PT this AM /90s w/o activity,  Gave STAT hydralazine 25mg.  BP came down to --increased >170 with therapy/exercise. Gave another STAT dose of 25mg hydralazine.  Added Hydralazine 50mg q.12h
Pt. seen with resident.  Agree with documentation above as per resident. Patient medically stable. Making progress towards rehab goals.
Pt. seen with resident.  Agree with documentation above as per resident with amendments made as appropriate. Patient medically stable. Making progress towards rehab goals.       BP elevated--increase hydralazine

## 2019-06-18 NOTE — PROGRESS NOTE ADULT - SUBJECTIVE AND OBJECTIVE BOX
Patient is a 63 year old male who presents with a chief complaint of functional deficits (18 Jun 2019 10:20)    Patient seen and examined, reports he feels better, no complaints      MEDICATIONS  (STANDING):  amLODIPine   Tablet 10 milliGRAM(s) Oral daily  aspirin 325 milliGRAM(s) Oral daily  atorvastatin 40 milliGRAM(s) Oral at bedtime  docusate sodium 100 milliGRAM(s) Oral three times a day  hydrALAZINE 75 milliGRAM(s) Oral every 8 hours  losartan 100 milliGRAM(s) Oral daily  pantoprazole    Tablet 40 milliGRAM(s) Oral before breakfast  polyethylene glycol 3350 17 Gram(s) Oral daily  senna 2 Tablet(s) Oral at bedtime        REVIEW OF SYSTEMS:  CONSTITUTIONAL: No fever, weight loss, has fatigue  EYES: No eye pain, visual disturbances, or discharge  ENMT:  No difficulty hearing, tinnitus, vertigo; No sinus or throat pain  NECK: No pain or stiffness  RESPIRATORY: No cough, wheezing, chills or hemoptysis; No shortness of breath  CARDIOVASCULAR: No chest pain, palpitations, dizziness, or leg swelling  GASTROINTESTINAL: No abdominal or epigastric pain. No nausea, vomiting, or hematemesis; No diarrhea, has constipation. No melena or hematochezia.  GENITOURINARY: No dysuria, frequency, hematuria, or incontinence  NEUROLOGICAL: Has dysarthria, RUE weakness, No headaches, memory loss,  numbness, or tremors  SKIN: No itching, burning, rashes, or lesions   ENDOCRINE: No heat or cold intolerance; No hair loss  MUSCULOSKELETAL: No joint pain or swelling; No muscle, back, or extremity pain  PSYCHIATRIC: depression, anxiety  HEME/LYMPH: No easy bruising, or bleeding gums  ALLERGY AND IMMUNOLOGIC: No hives or eczema        PHYSICAL EXAM:    T(C): 36.7 (06-18-19 @ 07:45), Max: 36.7 (06-17-19 @ 20:04)  HR: 90 (06-18-19 @ 07:45) (71 - 90)  BP: 153/85 (06-18-19 @ 07:45) (131/79 - 169/79)  RR: 14 (06-18-19 @ 07:45) (14 - 14)  SpO2: 98% (06-18-19 @ 07:45) (98% - 98%)    GENERAL: NAD, comfortable  HEAD:  Atraumatic, Normocephalic  EYES: EOMI, PERRL, conjunctiva and sclera clear  ENMT: No tonsillar erythema, exudates, or enlargement; Moist mucous membranes, Good dentition, No lesions  NECK: Supple, No JVD, Normal thyroid  NERVOUS SYSTEM:  Alert & Oriented X3, dysarthria, Good concentration; right facial droop, Motor Strength 5/5 B/L upper and lower extremities; DTRs 2+ intact and symmetric  CHEST/LUNG: Clear to ascultation bilaterally; No rales, rhonchi, wheezing, or rubs  HEART: Regular rate and rhythm; No murmurs, rubs, or gallops  ABDOMEN: Soft, Nontender, Nondistended; Bowel sounds present  EXTREMITIES:  2+ Peripheral Pulses, No clubbing, cyanosis, or edema  SKIN: No rash        LABS: none today        RADIOLOGY & ADDITIONAL TESTS:    Consultant(s) Notes Reviewed:  [x] YES  [ ] NO    Care Discussed with Consultants/Other Providers [x] YES  [ ] NO

## 2019-06-18 NOTE — PROGRESS NOTE ADULT - ASSESSMENT
ASSESSMENT/PLAN  63 year-old Man with a PMH of HTN, T2DM, HFpEF, who was found to have an acute CVA and is now with Gait Instability, ADL impairments and Functional impairments.    COMORBIDITIES / ACTIVE MEDICAL ISSUES     Gait Instability, ADL impairments and Functional impairments 2/2 acute CVA:   - Comprehensive Rehab Program of PT/OT/SLP  - Pain Mgmt - Tylenol PRN  - GI/Bowel Mgmt -  Colace, Senna, add Miralax PRN,  - /Bladder Mgmt -  monitor     Dysarthria / dysphagia  - SLP   - add vital stim     HTN  BP improved today  - Amlodipine, losartan  - cont. hydralazine, monitor BP     Compensated HFpEF (EF60% on 6/6/19)  - f/u cards outpt  - Cardiac meds as above    DM: A1c 5.2  - CC diet    FEN   - upgraded diet - soft + thins   - Dysphagia  SLP - treatment    Precautions / PROPHYLAXIS:   - Falls,   - Lungs: Aspiration, Incentive Spirometer (patient instructed at the bedside)  - Pressure injury/Skin: Turn Q2hrs while in bed, OOB to Chair, PT/OT    - DVT: Lovenox, SCDs, TEDs     Dispo - [facility] of 6/20 home with home care. ASSESSMENT/PLAN  63 year-old Man with a PMH of HTN, T2DM, HFpEF, who was found to have an acute CVA and is now with Gait Instability, ADL impairments and Functional impairments.    COMORBIDITIES / ACTIVE MEDICAL ISSUES     Gait Instability, ADL impairments and Functional impairments 2/2 acute CVA:   - Comprehensive Rehab Program of PT/OT/SLP  - Pain Mgmt - Tylenol PRN  - GI/Bowel Mgmt -  Colace, Senna, add Miralax PRN,  - /Bladder Mgmt -  monitor     Dysarthria / dysphagia  - SLP   - add vital stim     HTN  BP elevated  --increase hydralazine to 75mg q8h.  -cont.  Amlodipine, losartan  - monitor BP     Compensated HFpEF (EF60% on 6/6/19)  - f/u cards outpt  - Cardiac meds as above    DM: A1c 5.2  - CC diet    FEN   - upgraded diet - soft + thins   - Dysphagia  SLP - treatment    Precautions / PROPHYLAXIS:   - Falls,   - Lungs: Aspiration, Incentive Spirometer (patient instructed at the bedside)  - Pressure injury/Skin: Turn Q2hrs while in bed, OOB to Chair, PT/OT    - DVT: Lovenox, SCDs, TEDs     Dispo - [facility] of 6/20 home with home care.

## 2019-06-19 ENCOUNTER — TRANSCRIPTION ENCOUNTER (OUTPATIENT)
Age: 64
End: 2019-06-19

## 2019-06-19 VITALS — HEART RATE: 87 BPM | DIASTOLIC BLOOD PRESSURE: 79 MMHG | SYSTOLIC BLOOD PRESSURE: 151 MMHG

## 2019-06-19 PROCEDURE — 99232 SBSQ HOSP IP/OBS MODERATE 35: CPT

## 2019-06-19 PROCEDURE — 97116 GAIT TRAINING THERAPY: CPT

## 2019-06-19 PROCEDURE — 97530 THERAPEUTIC ACTIVITIES: CPT

## 2019-06-19 PROCEDURE — 92523 SPEECH SOUND LANG COMPREHEN: CPT

## 2019-06-19 PROCEDURE — 97535 SELF CARE MNGMENT TRAINING: CPT

## 2019-06-19 PROCEDURE — 90832 PSYTX W PT 30 MINUTES: CPT

## 2019-06-19 PROCEDURE — 80053 COMPREHEN METABOLIC PANEL: CPT

## 2019-06-19 PROCEDURE — 92610 EVALUATE SWALLOWING FUNCTION: CPT

## 2019-06-19 PROCEDURE — 97112 NEUROMUSCULAR REEDUCATION: CPT

## 2019-06-19 PROCEDURE — 92507 TX SP LANG VOICE COMM INDIV: CPT

## 2019-06-19 PROCEDURE — 99233 SBSQ HOSP IP/OBS HIGH 50: CPT

## 2019-06-19 PROCEDURE — 92526 ORAL FUNCTION THERAPY: CPT

## 2019-06-19 PROCEDURE — 85027 COMPLETE CBC AUTOMATED: CPT

## 2019-06-19 PROCEDURE — 97110 THERAPEUTIC EXERCISES: CPT

## 2019-06-19 PROCEDURE — 97163 PT EVAL HIGH COMPLEX 45 MIN: CPT

## 2019-06-19 RX ORDER — NEBIVOLOL HYDROCHLORIDE 5 MG/1
1 TABLET ORAL
Qty: 0 | Refills: 0 | DISCHARGE

## 2019-06-19 RX ORDER — ATORVASTATIN CALCIUM 80 MG/1
1 TABLET, FILM COATED ORAL
Qty: 30 | Refills: 0
Start: 2019-06-19

## 2019-06-19 RX ORDER — HYDRALAZINE HCL 50 MG
3 TABLET ORAL
Qty: 270 | Refills: 0
Start: 2019-06-19

## 2019-06-19 RX ORDER — VALSARTAN 80 MG/1
1 TABLET ORAL
Qty: 30 | Refills: 0
Start: 2019-06-19 | End: 2019-07-18

## 2019-06-19 RX ORDER — ASPIRIN/CALCIUM CARB/MAGNESIUM 324 MG
1 TABLET ORAL
Qty: 30 | Refills: 0
Start: 2019-06-19

## 2019-06-19 RX ORDER — AMLODIPINE BESYLATE 2.5 MG/1
1 TABLET ORAL
Qty: 30 | Refills: 0
Start: 2019-06-19

## 2019-06-19 RX ADMIN — AMLODIPINE BESYLATE 10 MILLIGRAM(S): 2.5 TABLET ORAL at 06:01

## 2019-06-19 RX ADMIN — Medication 75 MILLIGRAM(S): at 06:01

## 2019-06-19 RX ADMIN — Medication 100 MILLIGRAM(S): at 06:01

## 2019-06-19 RX ADMIN — Medication 100 MILLIGRAM(S): at 13:20

## 2019-06-19 RX ADMIN — Medication 75 MILLIGRAM(S): at 13:20

## 2019-06-19 RX ADMIN — POLYETHYLENE GLYCOL 3350 17 GRAM(S): 17 POWDER, FOR SOLUTION ORAL at 11:25

## 2019-06-19 RX ADMIN — Medication 325 MILLIGRAM(S): at 11:25

## 2019-06-19 RX ADMIN — PANTOPRAZOLE SODIUM 40 MILLIGRAM(S): 20 TABLET, DELAYED RELEASE ORAL at 06:01

## 2019-06-19 RX ADMIN — LOSARTAN POTASSIUM 100 MILLIGRAM(S): 100 TABLET, FILM COATED ORAL at 06:01

## 2019-06-19 NOTE — PROGRESS NOTE ADULT - SUBJECTIVE AND OBJECTIVE BOX
CC: Asked to evaluate patient for assessment of blood pressure medications at the time of discharge CC: Asked to evaluate patient for assessment of blood pressure medications at the time of discharge as patient was on prior meds at home    HISTORY OF PRESENT ILLNESS: 63 year old male with pmhx of HTN (poorly controlled despite reported compliance with bp meds), DM type 2 who presented to the ER with right facial droop and difficulty speaking. CT head intially negative, but patient was given TPA and sent to ICU for IV labetalol for SBP >200.  Pt's home bystolic was never restarted, and his home amlodipine increased to 10mg.   Pt sent to acute rehab, and his course on BIU including high blood pressures that required addition of hydralazine.  He has been on losartan while in hospital but at home takes valsartan  PAST MEDICAL & SURGICAL HISTORY:  DM (diabetes mellitus)  HTN (hypertension)  No significant past surgical history    MEDICATIONS  (STANDING):  amLODIPine   Tablet 10 milliGRAM(s) Oral daily  aspirin 325 milliGRAM(s) Oral daily  atorvastatin 40 milliGRAM(s) Oral at bedtime  docusate sodium 100 milliGRAM(s) Oral three times a day  hydrALAZINE 75 milliGRAM(s) Oral every 8 hours  losartan 100 milliGRAM(s) Oral daily  polyethylene glycol 3350 17 Gram(s) Oral daily  senna 2 Tablet(s) Oral at bedtime    MEDICATIONS  (PRN):    ROS: doing well, no CP SOB N V D HA; feels well enough to go home and is scheduled to go to [facility] for ongoing outpatient rehabilitation    T(C): 36.7 (06-18-19 @ 21:12), Max: 36.7 (06-18-19 @ 21:12)  HR: 77 (06-19-19 @ 05:55) (74 - 77)  BP: 150/77 (06-19-19 @ 05:55) (150/77 - 158/79)  RR: 18 (06-18-19 @ 21:12) (18 - 18)  SpO2: 98% (06-18-19 @ 21:12) (98% - 98%)  Wt(kg): --Vital Signs Last 24 Hrs  T(C): 36.7 (18 Jun 2019 21:12), Max: 36.7 (18 Jun 2019 21:12)  T(F): 98.1 (18 Jun 2019 21:12), Max: 98.1 (18 Jun 2019 21:12)  HR: 77 (19 Jun 2019 05:55) (74 - 77)  BP: 150/77 (19 Jun 2019 05:55) (150/77 - 158/79)  BP(mean): --  RR: 18 (18 Jun 2019 21:12) (18 - 18)  SpO2: 98% (18 Jun 2019 21:12) (98% - 98%)    PHYSICAL EXAM:  GENERAL: NAD, well-groomed, well-developed, ambulating independently in room  HEAD:  Atraumatic, Normocephalic, right facial droop  EYES: EOMI, PERRLA, conjunctiva and sclera clear  ENMT: No tonsillar erythema, exudates, or enlargement; Moist mucous membranes, Good dentition, No lesions  NECK: Supple, No JVD, Normal thyroid  NERVOUS SYSTEM:  Alert & Oriented X3, Good concentration; Motor Strength 5/5 B/L upper and lower extremities; DTRs 2+ intact and symmetric  CHEST/LUNG: Clear to percussion bilaterally; No rales, rhonchi, wheezing, or rubs  HEART: Regular rate and rhythm; No murmurs, rubs, or gallops  ABDOMEN: Soft, Nontender, Nondistended; Bowel sounds present  EXTREMITIES:  2+ Peripheral Pulses, No clubbing, cyanosis, or edema  LYMPH: No lymphadenopathy noted  SKIN: No rashes or lesions

## 2019-06-19 NOTE — PROGRESS NOTE ADULT - ASSESSMENT
ASSESSMENT/PLAN  63 year-old Man with a PMH of HTN, T2DM, HFpEF, who was found to have an acute CVA and is now with Gait Instability, ADL impairments and Functional impairments.    Patient medically stable for discharge to home.  Discharge medications and instructions reviewed with patient and pt's wife.  All questions answered. Pt. will f/u as outpatient at [facility].   Patient also advised to see his PCP Dr Barrera in the [facility] to have his blood pressure checked and adjust his medications as appropriate.       Dysarthria / dysphagia  - SLP   - add vital stim     HTN  BP controlled  Continue  -- hydralazine to 75mg q8h.  -cont.  Amlodipine, losartan can be discontinued and resume his home valsartan 320mg daily  - I advised them that if he were my patient I would reintroduce the bystolic as it has cardioprotective effects not offered by hydralazine, but to discuss this with Dr Barrera his PCP    Compensated HFpEF (EF60% on 6/6/19)  - f/u cardiologist as outpt vs PCP (they state they no longer see the cardiologist who lowered his blood pressure "too low")  - Cardiac meds as above  -daily weights at home, notify PCP if gains over 5 lbs    DM: A1c 5.2  - CC diet       THIS RESIDENT IS MEDICALLY STABLE FROM MY STANDPOINT FOR DISCHARGE

## 2019-06-19 NOTE — DISCHARGE NOTE PROVIDER - CARE PROVIDER_API CALL
Russ Kyle)  Neurology  [address], [address]  [place] [address]  Phone: [phone]  Fax: [phone]  Follow Up Time:     Dr. Nikole  PCP  Phone: (   )    -  Fax: (   )    -  Follow Up Time:     Mary Beth Panda (DO)  Brain Injury Medicine; PhysicalRehab Medicine  [address] [place] [address]  Phone: [phone]  Fax: [phone]  Follow Up Time:

## 2019-06-19 NOTE — DISCHARGE NOTE NURSING/CASE MANAGEMENT/SOCIAL WORK - NSDCDPATPORTLINK_GEN_ALL_CORE
You can access the [facility] Patient Portal, offered by [facility], by registering with the following website: [email or web address]

## 2019-06-19 NOTE — PROGRESS NOTE ADULT - SUBJECTIVE AND OBJECTIVE BOX
HPI:  SHADE CUELLO is a 62yo Male with pmhx of HTN (poorly controlled despite reported compliance with bp meds) DM type 2 who presents to the ER with right facial droop and difficulty speaking. Patients reports that in he felt off the evening before when visiting with his grandchildren. Patient then developed right. facial droop and dysarthria the next morning around 10am.  Denied paresthesias weakness, dizziness, vision loss, LOC, chest pain, SOB, N/V/D, fever, and urinary symptoms.     In the ER patient was code stroke, CTA Brain neg for intracranial hemorrhage and involutional and ischemic gliotic changes. Patient received tPA and labetelol. BP of 235/108 was reduced to 156/75.   Pt. admitted to icu, no events on tele for 24 hours as per icu attending, Dr. Tafoya. Pt. evaluated by neuro, speech (will need speech therapy and dsyphagia diet). OT/PT recommend acute rehab,   Pt. can tolerate and benefit from acute rehab.= Tolerating slightly high bp for perfusion s/p stroke , continue to monitor and increase or add other bp agents as needed. Increased norvasc to 10mg (11 Jun 2019 16:45)      PAST MEDICAL & SURGICAL HISTORY:  DM (diabetes mellitus)  HTN (hypertension)  No significant past surgical history      Subjective:  No new complaints.  Seen in therapy with wife--present for family training.  Pt. requesting if he can leave after therapy today    REVIEW OF SYMPTOMS  X] Constitutional WNL       [X] HEENT   [X] Cardio WNL              [X] Resp WNL            [X] GI constipation  [X]  WNL                               [X] MSK WNL            [X] Neuro + deficits.   [X] Cognitive mild deficits.       VITALS  Vital Signs Last 24 Hrs  T(C): 36.7 (18 Jun 2019 21:12), Max: 36.7 (18 Jun 2019 21:12)  T(F): 98.1 (18 Jun 2019 21:12), Max: 98.1 (18 Jun 2019 21:12)  HR: 77 (19 Jun 2019 05:55) (74 - 77)  BP: 150/77 (19 Jun 2019 05:55) (150/77 - 158/79)  BP(mean): --  RR: 18 (18 Jun 2019 21:12) (18 - 18)  SpO2: 98% (18 Jun 2019 21:12) (98% - 98%)      PHYSICAL EXAM  General- NAD, Comfortable                                                                                    HEENT - NCAT, EOMI  Cardio- RRR, S1S2, No murmurs                                                                           Resp- CTA bilaterally, No wheeze / rhonchi / Rales  Abdomen - BS+, Soft, NTND                                                                                    Extremities - No C/C/E, No calf tenderness   Skin: Intact                                                                                                                    Neuro                   Cognitive - Awake, Alert, AAO x 3                                                                          Communication - Fluent, +dysarthria  Cranial Nerves - CN VII impaired on the right side, but improved                                                                          Sensory - Intact to LT       MSK - wnl   Motor - No focal Deficits                                                                                      Psychiatric - Mood stable, Affect WNL  RECENT LABS                  RADIOLOGY/OTHER RESULTS      MEDICATIONS  (STANDING):  amLODIPine   Tablet 10 milliGRAM(s) Oral daily  aspirin 325 milliGRAM(s) Oral daily  atorvastatin 40 milliGRAM(s) Oral at bedtime  docusate sodium 100 milliGRAM(s) Oral three times a day  hydrALAZINE 75 milliGRAM(s) Oral every 8 hours  losartan 100 milliGRAM(s) Oral daily  polyethylene glycol 3350 17 Gram(s) Oral daily  senna 2 Tablet(s) Oral at bedtime    MEDICATIONS  (PRN):

## 2019-06-19 NOTE — DISCHARGE NOTE PROVIDER - HOSPITAL COURSE
63 year-old Male with pmhx of HTN (poorly controlled despite reported compliance with bp meds) DM type 2 who presents to the ER with right facial droop and difficulty speaking. Patients reports that in he felt off the evening before when visiting with his grandchildren. Patient then developed right. facial droop and dysarthria the next morning around 10am.  Denied paresthesias weakness, dizziness, vision loss, LOC, chest pain, SOB, N/V/D, fever, and urinary symptoms.         In the ER patient was code stroke, CTA Brain neg for intracranial hemorrhage and involutional and ischemic gliotic changes. Patient received tPA and labetelol. BP of 235/108 was reduced to 156/75.     Pt. admitted to icu, no events on tele for 24 hours as per icu attending, Dr. Tafoya. Pt. evaluated by neuro, speech (will need speech therapy and dsyphagia diet). OT/PT recommend acute rehab,     Pt. can tolerate and benefit from acute rehab. Tolerating slightly high bp for perfusion s/p stroke , continue to monitor and increase or add other bp agents as needed. Increased norvasc to 10mg        The patient was admitted to [facility] on 6/11/19. The patient participated in an comprehensive and rigorous rehabilitation program consisting of physical and occupational therapy. The patient was seen daily by a Physiatrist to oversee their rehabilitation care. The patient was admitted with deficits in gait and ambulation, ability to perform Activities of daily living, and overall function. There were weekly team meeting pertaining to the patients abilities to perform Tasks in PT/OT and goals were assigned as well as an Estimated Discharge date. The patient's Discharge Date assigned was 6/19/19 and the patient was able to obtain their goals by this discharge date and is now able to be discharged home.         Rehabilitation course: The patient had laboratory tests to monitor CBC (complete blood counts) and CMP (Complete metabolic Panel) that incorporates electrolytes and liver function tests. The patient had stable blood tests during their stay. The patient was started on an additional blood pressure medication (hydralazine) and the patients blood pressure improved.         The discharge plan was reviewed with the patient, medications were assigned to the preferred pharmacy, and all questions were answered.

## 2019-06-19 NOTE — PROGRESS NOTE ADULT - ASSESSMENT
ASSESSMENT/PLAN  63 year-old Man with a PMH of HTN, T2DM, HFpEF, who was found to have an acute CVA and is now with Gait Instability, ADL impairments and Functional impairments.    Patient medically stable for discharge to home.  Discharge medications and instructions reviewed with patient and pt's wife.  All questions answered. Pt. will f/u as outpatient at [facility].     COMORBIDITIES / ACTIVE MEDICAL ISSUES     Gait Instability, ADL impairments and Functional impairments 2/2 acute CVA:   - Comprehensive Rehab Program of PT/OT/SLP in outpatient  - Pain Mgmt - Tylenol PRN  - GI/Bowel Mgmt -  Colace, Senna, add Miralax PRN,  - /Bladder Mgmt -  monitor     Dysarthria / dysphagia  - SLP   - add vital stim     HTN  BP controlled  Spoke with Hospitalist regarding discharge medications for BP  -- hydralazine to 75mg q8h.  -cont.  Amlodipine, losartan  - monitor BP     Compensated HFpEF (EF60% on 6/6/19)  - f/u cards outpt  - Cardiac meds as above    DM: A1c 5.2  - CC diet    FEN   - upgraded diet - soft + thins   - Dysphagia  SLP - treatment

## 2019-06-19 NOTE — DISCHARGE NOTE PROVIDER - CARE PROVIDERS DIRECT ADDRESSES
,DirectAddress_Unknown,DirectAddress_Unknown,[email or web address]

## 2019-06-19 NOTE — DISCHARGE NOTE PROVIDER - PROVIDER TOKENS
PROVIDER:[TOKEN:[[id number]]],FREE:[LAST:[Nikole],FIRST:[],PHONE:[(   )    -],FAX:[(   )    -],ADDRESS:[PCP]],PROVIDER:[TOKEN:[[id number]]]

## 2019-06-19 NOTE — PROGRESS NOTE ADULT - REASON FOR ADMISSION
functional deficits
functional deficits after stroke with right facial droop
functional deficits

## 2019-06-19 NOTE — DISCHARGE NOTE NURSING/CASE MANAGEMENT/SOCIAL WORK - NSDCPEPTSTRK_GEN_ALL_CORE
Signs and symptoms of stroke/Call 911 for stroke/Stroke support groups for patients, families, and friends/Stroke warning signs and symptoms/Need for follow up after discharge/Prescribed medications/Risk factors for stroke/Stroke education booklet

## 2019-06-19 NOTE — DISCHARGE NOTE PROVIDER - NSDCCPCAREPLAN_GEN_ALL_CORE_FT
PRINCIPAL DISCHARGE DIAGNOSIS  Diagnosis: Acute CVA (cerebrovascular accident)  Assessment and Plan of Treatment: likely ischemic, continue with aspirin, statin, rehab, control of HTN and DM      SECONDARY DISCHARGE DIAGNOSES  Diagnosis: Chronic diastolic heart failure  Assessment and Plan of Treatment: no signs of decompensation, as per echo,, follow as outpatient, continue with current cardiac medications    Diagnosis: Dysphagia  Assessment and Plan of Treatment: dysphagia diet, monitor for improvement    Diagnosis: Dysarthria due to acute stroke  Assessment and Plan of Treatment: speech therapy    Diagnosis: Type 2 diabetes mellitus  Assessment and Plan of Treatment: well controlled, continue with home regimen    Diagnosis: Hypertension  Assessment and Plan of Treatment: continue valsartan, amlodipine (increase to 10mg if needed), add back in BB , bystolic, close follow-up with PMD

## 2019-06-19 NOTE — PROGRESS NOTE ADULT - PROVIDER SPECIALTY LIST ADULT
Hospitalist
Rehab Medicine
Hospitalist
Rehab Medicine
Hospitalist

## 2019-07-15 NOTE — PHYSICAL THERAPY INITIAL EVALUATION ADULT - PATIENT PROFILE REVIEW, REHAB EVAL
Start the antibiotic tonight    Also start saccharomyces boulardii 250 mg twice daily until finished with antibiotic and then do 250 mg once daily for 2 weeks. [place] is a good brand, available at [employer] [facility] [employer].    If you develop the following, please call the clinic immediately:  - fever, nausea, vomiting,   - worsening hand pain or rapid swelling/redness growth   - loss of sensation or function of the hand  - severe tightening sensation around the wrist  
yes

## 2020-01-07 NOTE — DISCHARGE NOTE PROVIDER - NSTOBACCOUSAGEY/N_GEN_A_CS
Chief Complaint   Patient presents with   • Cough   • Fever       Emilio Adams male 21 m.o.    History was provided by the mother.    Cough runny nose and fever for past 4 days        The following portions of the patient's history were reviewed and updated as appropriate: allergies, current medications, past family history, past medical history, past social history, past surgical history and problem list.    Current Outpatient Medications   Medication Sig Dispense Refill   • cefdinir (OMNICEF) 125 MG/5ML suspension Take 5 mL by mouth Daily for 10 days. 50 mL 0   • prednisoLONE (PRELONE) 15 MG/5ML syrup Take 4 mL by mouth 2 (Two) Times a Day for 5 days. 40 mL 0     No current facility-administered medications for this visit.        No Known Allergies        Review of Systems   Constitutional: Positive for fever. Negative for activity change, appetite change and fatigue.   HENT: Negative for congestion, ear discharge, ear pain, hearing loss, mouth sores, rhinorrhea, sneezing, sore throat and swollen glands.    Eyes: Negative for discharge, redness and visual disturbance.   Respiratory: Positive for cough. Negative for wheezing and stridor.    Cardiovascular: Negative for chest pain.   Gastrointestinal: Negative for abdominal pain, constipation, diarrhea, nausea, vomiting and GERD.   Genitourinary: Negative for dysuria, enuresis and frequency.   Musculoskeletal: Negative for arthralgias and myalgias.   Skin: Negative for rash.   Neurological: Negative for headache.   Hematological: Negative for adenopathy.   Psychiatric/Behavioral: Negative for behavioral problems and sleep disturbance.              Temp 98.4 °F (36.9 °C)   Wt 13.9 kg (30 lb 9.8 oz)     Physical Exam   Constitutional: He appears well-developed and well-nourished.   HENT:   Right Ear: Tympanic membrane is erythematous and bulging.   Left Ear: Tympanic membrane is erythematous and bulging.   Nose: Nose normal. No nasal discharge.   Mouth/Throat:  Mucous membranes are moist. Dentition is normal. No tonsillar exudate. Oropharynx is clear. Pharynx is normal.   Eyes: Conjunctivae are normal. Right eye exhibits no discharge. Left eye exhibits no discharge.   Neck: Neck supple.   Cardiovascular: Normal rate, regular rhythm, S1 normal and S2 normal. Pulses are palpable.   No murmur heard.  Pulmonary/Chest: Effort normal and breath sounds normal. No nasal flaring or stridor. No respiratory distress. He has no wheezes. He has no rhonchi. He has no rales. He exhibits no retraction.   Abdominal: Soft. Bowel sounds are normal. He exhibits no distension and no mass. There is no hepatosplenomegaly. There is no tenderness. There is no rebound and no guarding.   Musculoskeletal: Normal range of motion.   Lymphadenopathy: No occipital adenopathy is present.     He has no cervical adenopathy.   Neurological: He is alert.   Skin: Skin is warm and dry. No rash noted.         Assessment/Plan     Diagnoses and all orders for this visit:    1. Non-recurrent acute suppurative otitis media of both ears without spontaneous rupture of tympanic membranes (Primary)  -     cefdinir (OMNICEF) 125 MG/5ML suspension; Take 5 mL by mouth Daily for 10 days.  Dispense: 50 mL; Refill: 0    2. Bronchitis  -     prednisoLONE (PRELONE) 15 MG/5ML syrup; Take 4 mL by mouth 2 (Two) Times a Day for 5 days.  Dispense: 40 mL; Refill: 0    3. Fever, unspecified fever cause  -     POC Influenza A / B          Return if symptoms worsen or fail to improve.                     No

## 2021-05-05 NOTE — PROGRESS NOTE ADULT - SUBJECTIVE AND OBJECTIVE BOX
Patient is a 63y old  Male who presents with a chief complaint of CVA on tPA (07 Jun 2019 10:27)      INTERVAL HPI/OVERNIGHT EVENTS:  pt seen and examined, reports constipation, other wise feels ok, tolerating dysphagia diet.    T(C): 36.8 (06-08-19 @ 17:33), Max: 36.8 (06-08-19 @ 17:33)  HR: 71 (06-08-19 @ 17:33) (68 - 72)  BP: 168/80 (06-08-19 @ 17:33) (146/70 - 168/80)  RR: 17 (06-08-19 @ 17:33) (16 - 17)  SpO2: 98% (06-08-19 @ 17:33) (97% - 98%)    MEDICATIONS  (STANDING):  amLODIPine   Tablet 5 milliGRAM(s) Oral daily  aspirin 325 milliGRAM(s) Oral daily  atorvastatin 40 milliGRAM(s) Oral at bedtime  dextrose 5%. 1000 milliLiter(s) (50 mL/Hr) IV Continuous <Continuous>  dextrose 50% Injectable 12.5 Gram(s) IV Push once  dextrose 50% Injectable 25 Gram(s) IV Push once  dextrose 50% Injectable 25 Gram(s) IV Push once  docusate sodium 100 milliGRAM(s) Oral three times a day  insulin lispro (HumaLOG) corrective regimen sliding scale   SubCutaneous at bedtime  insulin lispro (HumaLOG) corrective regimen sliding scale   SubCutaneous three times a day before meals  losartan 100 milliGRAM(s) Oral daily  pantoprazole    Tablet 40 milliGRAM(s) Oral before breakfast  polyethylene glycol 3350 17 Gram(s) Oral daily  senna 2 Tablet(s) Oral at bedtime    MEDICATIONS  (PRN):  dextrose 40% Gel 15 Gram(s) Oral once PRN Blood Glucose LESS THAN 70 milliGRAM(s)/deciliter  glucagon  Injectable 1 milliGRAM(s) IntraMuscular once PRN Glucose LESS THAN 70 milligrams/deciliter    Allergies    No Known Allergies    Intolerances        Vital Signs Last 24 Hrs    PHYSICAL EXAM:  GENERAL: NAD, well-groomed, well-developed, sitting in chair, dysarthric (but comprehensible speech),   HEAD:  Atraumatic, Normocephalic, Right facial droop  EYES: EOMI, conjunctiva and sclera clear  ENMT: No tonsillar erythema, exudates, or enlargement; Moist mucous membranes, Good dentition, No lesions  NERVOUS SYSTEM:  Alert & Oriented X3, Good concentration; Motor Strength grossly intact  CHEST/LUNG: Clear to percussion bilaterally; No rales, rhonchi, wheezing, or rubs  HEART: Regular rate and rhythm; No murmurs, rubs, or gallops  ABDOMEN: Soft, Nontender, Nondistended; Bowel sounds present  EXTREMITIES:  2+ Peripheral Pulses, No clubbing, cyanosis, or edema  LYMPH: No lymphadenopathy noted  SKIN: No rashes or lesions                            16.2   4.81  )-----------( 161      ( 07 Jun 2019 05:44 )             47.7                     RADIOLOGY & ADDITIONAL TESTS:  < from: TTE Echo Doppler w/o Cont (06.06.19 @ 07:23) >   EXAM:  TTE WO CON COMPLETE W DOPPL         PROCEDURE DATE:  06/06/2019      < end of copied text >  < from: TTE Echo Doppler w/o Cont (06.06.19 @ 07:23) >  Summary:   1. Left ventricular ejection fraction, by visual estimation, is 60 to   65%.   2. Technically fair study.   3. Normal global left ventricular systolic function.   4. Normal left ventricular internal cavity size.   5. Spectral Doppler shows impaired relaxation pattern of left   ventricular myocardial filling (Grade I diastolic dysfunction).   6. Normal right ventricular size and function.   7. There is no evidence of pericardial effusion.   8. Structurally normal mitral valve, with normal leaflet excursion.   9. Trace mitral valve regurgitation.    Shravan Haines MD FACC, FASE, FACP  Electronically signed on 6/7/2019 at 12:28:38 PM    < end of copied text >      Imaging Personally Reviewed:  [x ] YES  [ ] NO    Consultant(s) Notes Reviewed:  [x ] YES  [ ] NO    Care Discussed with Consultants/Other Providers [x ] YES  [ ] NO Home

## 2021-08-07 NOTE — PROGRESS NOTE ADULT - ASSESSMENT
Patient is a 62 yo M with a pmhx of HTN (poorly controlled despite reported compliance with bp meds) DM type 2 who presents to the ED with right facial droop and difficulty speaking. Patients reports that, in retrospect he felt off the evening before, when visiting with his grandchildren. Patient then developed right facial droop and dysarthria in the morning at 10am. s/p tPA and ICU observation, no events on tele as per ICU attending, transferred to medical floor, still with facial droop and dysarthria No

## 2022-05-31 ENCOUNTER — APPOINTMENT (OUTPATIENT)
Dept: INTERNAL MEDICINE | Facility: CLINIC | Age: 67
End: 2022-05-31
Payer: MEDICARE

## 2022-05-31 VITALS
SYSTOLIC BLOOD PRESSURE: 163 MMHG | HEIGHT: 67 IN | OXYGEN SATURATION: 98 % | DIASTOLIC BLOOD PRESSURE: 75 MMHG | HEART RATE: 78 BPM | BODY MASS INDEX: 37.35 KG/M2 | WEIGHT: 238 LBS | TEMPERATURE: 97.8 F

## 2022-05-31 LAB — HBA1C MFR BLD HPLC: 6.1

## 2022-05-31 PROCEDURE — 99204 OFFICE O/P NEW MOD 45 MIN: CPT | Mod: 25

## 2022-05-31 PROCEDURE — G0444 DEPRESSION SCREEN ANNUAL: CPT | Mod: 59

## 2022-05-31 PROCEDURE — G0442 ANNUAL ALCOHOL SCREEN 15 MIN: CPT | Mod: 59

## 2022-05-31 RX ORDER — ASPIRIN 81 MG
81 TABLET, DELAYED RELEASE (ENTERIC COATED) ORAL DAILY
Refills: 0 | Status: ACTIVE | COMMUNITY

## 2022-05-31 NOTE — ASSESSMENT
[FreeTextEntry1] : - SHADE CUELLO with multiple comorbidities now clinically stable\par - further recommendations pending labs\par - continue current medications\par Blood work drawn in office today\par \par health maintenance-\par overdue for colonoscopy\par \par blood reviewed \par DM well controlled\par \par HTN- elevated- not taking hydralazine bid\par only once a day\par makes him dizzy\par will lower dose to make compliance better\par \par

## 2022-05-31 NOTE — HEALTH RISK ASSESSMENT
[Good] : ~his/her~  mood as  good [Yes] : Yes [2 - 4 times a month (2 pts)] : 2-4 times a month (2 points) [1 or 2 (0 pts)] : 1 or 2 (0 points) [Never (0 pts)] : Never (0 points) [No] : In the past 12 months have you used drugs other than those required for medical reasons? No [No falls in past year] : Patient reported no falls in the past year [0] : 2) Feeling down, depressed, or hopeless: Not at all (0) [HIV Test offered] : HIV Test offered [Hepatitis C test offered] : Hepatitis C test offered [# of Members in Household ___] :  household currently consist of [unfilled] member(s) [Retired] : retired [] :  [Reports changes in vision] : Reports changes in vision [Smoke Detector] : smoke detector [Carbon Monoxide Detector] : carbon monoxide detector [Seat Belt] :  uses seat belt [Sunscreen] : uses sunscreen [Never] : Never [PHQ-2 Negative - No further assessment needed] : PHQ-2 Negative - No further assessment needed [# Of Children ___] : has [unfilled] children [de-identified] : No [de-identified] : pcp at [facility]  [de-identified] : No [de-identified] : a glass of wine sometimes  [Audit-CScore] : 2 [de-identified] : Walking, body weight squast  [de-identified] : Regular  [[id number]] : 0 [Reports changes in hearing] : Reports no changes in hearing [Reports changes in dental health] : Reports no changes in dental health [ColonoscopyComments] : Never had one [de-identified] : Wife

## 2022-08-05 ENCOUNTER — LABORATORY RESULT (OUTPATIENT)
Age: 67
End: 2022-08-05

## 2022-08-05 ENCOUNTER — APPOINTMENT (OUTPATIENT)
Dept: INTERNAL MEDICINE | Facility: CLINIC | Age: 67
End: 2022-08-05

## 2022-08-05 VITALS
HEIGHT: 67 IN | DIASTOLIC BLOOD PRESSURE: 72 MMHG | WEIGHT: 238 LBS | SYSTOLIC BLOOD PRESSURE: 162 MMHG | TEMPERATURE: 97.8 F | OXYGEN SATURATION: 97 % | BODY MASS INDEX: 37.35 KG/M2 | HEART RATE: 80 BPM

## 2022-08-05 PROCEDURE — 99214 OFFICE O/P EST MOD 30 MIN: CPT | Mod: 25

## 2022-08-05 PROCEDURE — 36415 COLL VENOUS BLD VENIPUNCTURE: CPT

## 2022-08-05 NOTE — ASSESSMENT
[FreeTextEntry1] : DM- well controlled with diet and exersice\par \par on a statin\par \par \par HTN- elevated - will add lisinopril 5mg \par follow up 6 weeks for BP check\par \par \par \par Blood work drawn in office today\par

## 2022-08-08 LAB
25(OH)D3 SERPL-MCNC: 34.9 NG/ML
ALBUMIN SERPL ELPH-MCNC: 4.7 G/DL
ALP BLD-CCNC: 79 U/L
ALT SERPL-CCNC: 22 U/L
ANION GAP SERPL CALC-SCNC: 11 MMOL/L
AST SERPL-CCNC: 23 U/L
BASOPHILS # BLD AUTO: 0.06 K/UL
BASOPHILS NFR BLD AUTO: 1 %
BILIRUB SERPL-MCNC: 2 MG/DL
BUN SERPL-MCNC: 10 MG/DL
CALCIUM SERPL-MCNC: 9.8 MG/DL
CHLORIDE SERPL-SCNC: 99 MMOL/L
CHOLEST SERPL-MCNC: 128 MG/DL
CO2 SERPL-SCNC: 26 MMOL/L
CREAT SERPL-MCNC: 0.97 MG/DL
EGFR: 86 ML/MIN/1.73M2
EOSINOPHIL # BLD AUTO: 0.16 K/UL
EOSINOPHIL NFR BLD AUTO: 2.7 %
ESTIMATED AVERAGE GLUCOSE: 151 MG/DL
GLUCOSE SERPL-MCNC: 172 MG/DL
HBA1C MFR BLD HPLC: 6.9 %
HCT VFR BLD CALC: 48.1 %
HDLC SERPL-MCNC: 62 MG/DL
HGB BLD-MCNC: 15.7 G/DL
IMM GRANULOCYTES NFR BLD AUTO: 0.2 %
LDLC SERPL CALC-MCNC: 48 MG/DL
LYMPHOCYTES # BLD AUTO: 1.37 K/UL
LYMPHOCYTES NFR BLD AUTO: 23.1 %
MAN DIFF?: NORMAL
MCHC RBC-ENTMCNC: 30.5 PG
MCHC RBC-ENTMCNC: 32.6 GM/DL
MCV RBC AUTO: 93.6 FL
MONOCYTES # BLD AUTO: 0.5 K/UL
MONOCYTES NFR BLD AUTO: 8.4 %
NEUTROPHILS # BLD AUTO: 3.84 K/UL
NEUTROPHILS NFR BLD AUTO: 64.6 %
NONHDLC SERPL-MCNC: 66 MG/DL
PLATELET # BLD AUTO: 151 K/UL
POTASSIUM SERPL-SCNC: 4.9 MMOL/L
PROT SERPL-MCNC: 7.2 G/DL
RBC # BLD: 5.14 M/UL
RBC # FLD: 13 %
SODIUM SERPL-SCNC: 135 MMOL/L
TRIGL SERPL-MCNC: 89 MG/DL
TSH SERPL-ACNC: 4.8 UIU/ML
WBC # FLD AUTO: 5.94 K/UL

## 2022-09-07 ENCOUNTER — APPOINTMENT (OUTPATIENT)
Dept: INTERNAL MEDICINE | Facility: CLINIC | Age: 67
End: 2022-09-07

## 2022-09-07 ENCOUNTER — LABORATORY RESULT (OUTPATIENT)
Age: 67
End: 2022-09-07

## 2022-09-07 VITALS
WEIGHT: 238 LBS | OXYGEN SATURATION: 98 % | BODY MASS INDEX: 37.35 KG/M2 | HEART RATE: 79 BPM | TEMPERATURE: 97.5 F | HEIGHT: 67 IN | DIASTOLIC BLOOD PRESSURE: 72 MMHG | SYSTOLIC BLOOD PRESSURE: 148 MMHG

## 2022-09-07 PROCEDURE — 99213 OFFICE O/P EST LOW 20 MIN: CPT | Mod: 25

## 2022-09-07 PROCEDURE — 36415 COLL VENOUS BLD VENIPUNCTURE: CPT

## 2022-09-07 NOTE — ASSESSMENT
[FreeTextEntry1] : DM- well controlled with diet and exersice- med added\par \par on a statin\par \par \par HTN- improved with lisinopril\par \par elevated tsh- needs repeat tsh\par \par \par \par Blood work drawn in office today\par

## 2022-09-07 NOTE — HEALTH RISK ASSESSMENT

## 2022-09-07 NOTE — HISTORY OF PRESENT ILLNESS
[FreeTextEntry1] : follow up [de-identified] : follow up\par \par PMHx of HTN, Dm, HLD\par \par htn improved with lisnopril 5 mg\par \par need to repeat tsh level- was elevated last visit\par

## 2022-09-09 LAB — TSH SERPL-ACNC: 4.67 UIU/ML

## 2022-11-14 ENCOUNTER — NON-APPOINTMENT (OUTPATIENT)
Age: 67
End: 2022-11-14

## 2022-11-14 ENCOUNTER — APPOINTMENT (OUTPATIENT)
Dept: INTERNAL MEDICINE | Facility: CLINIC | Age: 67
End: 2022-11-14

## 2022-11-14 VITALS
SYSTOLIC BLOOD PRESSURE: 186 MMHG | TEMPERATURE: 97.9 F | WEIGHT: 240 LBS | BODY MASS INDEX: 37.67 KG/M2 | OXYGEN SATURATION: 97 % | HEART RATE: 69 BPM | DIASTOLIC BLOOD PRESSURE: 93 MMHG | HEIGHT: 67 IN

## 2022-11-14 DIAGNOSIS — R05.3 CHRONIC COUGH: ICD-10-CM

## 2022-11-14 PROCEDURE — 93000 ELECTROCARDIOGRAM COMPLETE: CPT

## 2022-11-14 PROCEDURE — 99215 OFFICE O/P EST HI 40 MIN: CPT | Mod: 25

## 2022-11-15 ENCOUNTER — NON-APPOINTMENT (OUTPATIENT)
Age: 67
End: 2022-11-15

## 2022-11-15 PROBLEM — R05.3 PERSISTENT COUGH: Status: ACTIVE | Noted: 2022-11-14

## 2022-11-15 LAB — NT-PROBNP SERPL-MCNC: 150 PG/ML

## 2022-11-15 NOTE — PHYSICAL EXAM
[No Acute Distress] : no acute distress [No Lymphadenopathy] : no lymphadenopathy [No Respiratory Distress] : no respiratory distress  [No Accessory Muscle Use] : no accessory muscle use [Normal Rate] : normal rate  [Regular Rhythm] : with a regular rhythm [Normal S1, S2] : normal S1 and S2 [Normal Gait] : normal gait [Normal Affect] : the affect was normal [Alert and Oriented x3] : oriented to person, place, and time [de-identified] : clear to auscultation in lung fields

## 2022-11-15 NOTE — HISTORY OF PRESENT ILLNESS
[de-identified] : 66 yo M with persistent cough.\par \par went to an outdoor wedding in [place] and caught a cold in Sept\par productive cough of black/green phlegm\par occasional sneeze\par bothered by post nasal drip\par COVID negative\par states he feels like he has water in his lungs\par was told in the past he has an enlarged heart\par difficulty laying down flat

## 2022-11-15 NOTE — HEALTH RISK ASSESSMENT
[Intercurrent Urgi Care visits] : went to urgent care [Never] : Never [Yes] : Yes [2 - 3 times a week (3 pts)] : 2 - 3  times a week (3 points) [1 or 2 (0 pts)] : 1 or 2 (0 points) [Never (0 pts)] : Never (0 points) [No] : In the past 12 months have you used drugs other than those required for medical reasons? No [No falls in past year] : Patient reported no falls in the past year [0] : 2) Feeling down, depressed, or hopeless: Not at all (0) [de-identified] : no [Audit-CScore] : 3 [de-identified] : fair [de-identified] : walking

## 2022-12-08 ENCOUNTER — APPOINTMENT (OUTPATIENT)
Dept: INTERNAL MEDICINE | Facility: CLINIC | Age: 67
End: 2022-12-08

## 2022-12-08 ENCOUNTER — LABORATORY RESULT (OUTPATIENT)
Age: 67
End: 2022-12-08

## 2022-12-08 VITALS
TEMPERATURE: 97.9 F | OXYGEN SATURATION: 99 % | BODY MASS INDEX: 37.83 KG/M2 | WEIGHT: 241 LBS | HEART RATE: 72 BPM | SYSTOLIC BLOOD PRESSURE: 158 MMHG | HEIGHT: 67 IN | DIASTOLIC BLOOD PRESSURE: 78 MMHG

## 2022-12-08 DIAGNOSIS — H61.22 IMPACTED CERUMEN, LEFT EAR: ICD-10-CM

## 2022-12-08 DIAGNOSIS — R09.82 POSTNASAL DRIP: ICD-10-CM

## 2022-12-08 PROCEDURE — 99214 OFFICE O/P EST MOD 30 MIN: CPT | Mod: 25

## 2022-12-08 PROCEDURE — 36415 COLL VENOUS BLD VENIPUNCTURE: CPT

## 2022-12-08 RX ORDER — ACETIC ACID 20 MG/ML
2 SOLUTION AURICULAR (OTIC)
Qty: 1 | Refills: 8 | Status: ACTIVE | COMMUNITY
Start: 2022-12-08 | End: 1900-01-01

## 2022-12-08 NOTE — ASSESSMENT
[FreeTextEntry1] : follow up\par \par DM- last hba1c 6.9\par compliant with meds\par \par c/o pnd- flonase helping\par suggest to use saline spray prior to flonase\par \par HTN- upped his hydralazient o 50 on his own and BP improved today\par \par \par Obesity with comorbidities- DM stable- and weight is stable\par \par impacted cerumen

## 2022-12-08 NOTE — HISTORY OF PRESENT ILLNESS
[FreeTextEntry1] : follow up [de-identified] : follow up\par \par DM- last hba1c 6.9\par compliant with meds\par \par c/o pnd- flonase helping\par suggest to use saline spray prior to flonase\par \par HTN- upped his hydralazient o 50 on his own and BP improved today\par \par

## 2022-12-08 NOTE — HEALTH RISK ASSESSMENT
[Intercurrent Urgi Care visits] : went to urgent care [Never] : Never [Yes] : Yes [2 - 3 times a week (3 pts)] : 2 - 3  times a week (3 points) [1 or 2 (0 pts)] : 1 or 2 (0 points) [No] : In the past 12 months have you used drugs other than those required for medical reasons? No [No falls in past year] : Patient reported no falls in the past year [0] : 2) Feeling down, depressed, or hopeless: Not at all (0) [de-identified] : x-ray of lungs  [Audit-CScore] : 3 [de-identified] : walking,squats [de-identified] : regular  [[id number]] : 0

## 2022-12-09 LAB
ALBUMIN SERPL ELPH-MCNC: 4.7 G/DL
ALP BLD-CCNC: 78 U/L
ALT SERPL-CCNC: 17 U/L
ANION GAP SERPL CALC-SCNC: 13 MMOL/L
AST SERPL-CCNC: 16 U/L
BASOPHILS # BLD AUTO: 0.05 K/UL
BASOPHILS NFR BLD AUTO: 0.8 %
BILIRUB SERPL-MCNC: 2.3 MG/DL
BUN SERPL-MCNC: 15 MG/DL
CALCIUM SERPL-MCNC: 9.8 MG/DL
CHLORIDE SERPL-SCNC: 96 MMOL/L
CHOLEST SERPL-MCNC: 145 MG/DL
CO2 SERPL-SCNC: 24 MMOL/L
CREAT SERPL-MCNC: 1.04 MG/DL
CREAT SPEC-SCNC: 203 MG/DL
EGFR: 78 ML/MIN/1.73M2
EOSINOPHIL # BLD AUTO: 0.11 K/UL
EOSINOPHIL NFR BLD AUTO: 1.7 %
ESTIMATED AVERAGE GLUCOSE: 157 MG/DL
FOLATE SERPL-MCNC: 16.9 NG/ML
GLUCOSE SERPL-MCNC: 207 MG/DL
HBA1C MFR BLD HPLC: 7.1 %
HCT VFR BLD CALC: 49 %
HDLC SERPL-MCNC: 59 MG/DL
HGB BLD-MCNC: 16.1 G/DL
IMM GRANULOCYTES NFR BLD AUTO: 0.3 %
IRON SATN MFR SERPL: 38 %
IRON SERPL-MCNC: 117 UG/DL
LDLC SERPL CALC-MCNC: 65 MG/DL
LYMPHOCYTES # BLD AUTO: 1.88 K/UL
LYMPHOCYTES NFR BLD AUTO: 28.5 %
MAN DIFF?: NORMAL
MCHC RBC-ENTMCNC: 30 PG
MCHC RBC-ENTMCNC: 32.9 GM/DL
MCV RBC AUTO: 91.2 FL
MICROALBUMIN 24H UR DL<=1MG/L-MCNC: 11 MG/DL
MICROALBUMIN/CREAT 24H UR-RTO: 54 MG/G
MONOCYTES # BLD AUTO: 0.58 K/UL
MONOCYTES NFR BLD AUTO: 8.8 %
NEUTROPHILS # BLD AUTO: 3.96 K/UL
NEUTROPHILS NFR BLD AUTO: 59.9 %
NONHDLC SERPL-MCNC: 86 MG/DL
PLATELET # BLD AUTO: 190 K/UL
POTASSIUM SERPL-SCNC: 4.7 MMOL/L
PROT SERPL-MCNC: 7.3 G/DL
RBC # BLD: 5.37 M/UL
RBC # FLD: 12.8 %
SODIUM SERPL-SCNC: 133 MMOL/L
TIBC SERPL-MCNC: 307 UG/DL
TRIGL SERPL-MCNC: 107 MG/DL
TSH SERPL-ACNC: 4.26 UIU/ML
UIBC SERPL-MCNC: 191 UG/DL
VIT B12 SERPL-MCNC: 347 PG/ML
WBC # FLD AUTO: 6.6 K/UL

## 2023-02-09 ENCOUNTER — RX RENEWAL (OUTPATIENT)
Age: 68
End: 2023-02-09

## 2023-02-09 RX ORDER — FLUTICASONE PROPIONATE 50 UG/1
50 SPRAY, METERED NASAL
Qty: 48 | Refills: 0 | Status: ACTIVE | COMMUNITY
Start: 2022-11-14 | End: 1900-01-01

## 2023-03-10 ENCOUNTER — APPOINTMENT (OUTPATIENT)
Dept: INTERNAL MEDICINE | Facility: CLINIC | Age: 68
End: 2023-03-10
Payer: MEDICARE

## 2023-03-10 VITALS
TEMPERATURE: 97.9 F | HEART RATE: 68 BPM | SYSTOLIC BLOOD PRESSURE: 149 MMHG | OXYGEN SATURATION: 99 % | BODY MASS INDEX: 37.35 KG/M2 | HEIGHT: 67 IN | DIASTOLIC BLOOD PRESSURE: 80 MMHG | WEIGHT: 238 LBS

## 2023-03-10 DIAGNOSIS — R21 RASH AND OTHER NONSPECIFIC SKIN ERUPTION: ICD-10-CM

## 2023-03-10 DIAGNOSIS — Z86.73 PERSONAL HISTORY OF TRANSIENT ISCHEMIC ATTACK (TIA), AND CEREBRAL INFARCTION W/OUT RESIDUAL DEFICITS: ICD-10-CM

## 2023-03-10 PROCEDURE — 99214 OFFICE O/P EST MOD 30 MIN: CPT | Mod: 25

## 2023-03-10 PROCEDURE — 36415 COLL VENOUS BLD VENIPUNCTURE: CPT

## 2023-03-10 RX ORDER — TRIAMCINOLONE ACETONIDE 1 MG/G
0.1 CREAM TOPICAL
Qty: 1 | Refills: 1 | Status: ACTIVE | COMMUNITY
Start: 2023-03-10 | End: 1900-01-01

## 2023-03-10 RX ORDER — ATORVASTATIN CALCIUM 40 MG/1
40 TABLET, FILM COATED ORAL
Qty: 90 | Refills: 1 | Status: DISCONTINUED | COMMUNITY
Start: 2022-01-05 | End: 2023-03-10

## 2023-03-10 NOTE — HISTORY OF PRESENT ILLNESS
[FreeTextEntry1] : follow up [de-identified] : follow up\par \par DM- hba1c 7.1 last visit\par wiill recheck levels\par \par obesity- weight is stable\par \par has a rash on arms- needs  asteroid cream\par \par hld- atorvastatin causing muscle aches- will change to pravastatin\par \par htn- stable\par cont meds\par

## 2023-03-10 NOTE — PHYSICAL EXAM
[No Acute Distress] : no acute distress [Well Nourished] : well nourished [Well Developed] : well developed [Well-Appearing] : well-appearing [Normal Sclera/Conjunctiva] : normal sclera/conjunctiva [PERRL] : pupils equal round and reactive to light [EOMI] : extraocular movements intact [Normal Outer Ear/Nose] : the outer ears and nose were normal in appearance [Normal Oropharynx] : the oropharynx was normal [No JVD] : no jugular venous distention [No Lymphadenopathy] : no lymphadenopathy [Supple] : supple [Thyroid Normal, No Nodules] : the thyroid was normal and there were no nodules present [No Respiratory Distress] : no respiratory distress  [No Accessory Muscle Use] : no accessory muscle use [Clear to Auscultation] : lungs were clear to auscultation bilaterally [Normal Rate] : normal rate  [Regular Rhythm] : with a regular rhythm [Normal S1, S2] : normal S1 and S2 [No Murmur] : no murmur heard [No Carotid Bruits] : no carotid bruits [No Abdominal Bruit] : a ~M bruit was not heard ~T in the abdomen [No Varicosities] : no varicosities [Pedal Pulses Present] : the pedal pulses are present [No Edema] : there was no peripheral edema [No Palpable Aorta] : no palpable aorta [No Extremity Clubbing/Cyanosis] : no extremity clubbing/cyanosis [Soft] : abdomen soft [Non Tender] : non-tender [Non-distended] : non-distended [No Masses] : no abdominal mass palpated [No HSM] : no HSM [Normal Bowel Sounds] : normal bowel sounds [Normal Posterior Cervical Nodes] : no posterior cervical lymphadenopathy [Normal Anterior Cervical Nodes] : no anterior cervical lymphadenopathy [No CVA Tenderness] : no CVA  tenderness [No Spinal Tenderness] : no spinal tenderness [No Joint Swelling] : no joint swelling [Grossly Normal Strength/Tone] : grossly normal strength/tone [No Rash] : no rash [Coordination Grossly Intact] : coordination grossly intact [No Focal Deficits] : no focal deficits [Normal Gait] : normal gait [Deep Tendon Reflexes (DTR)] : deep tendon reflexes were 2+ and symmetric [Normal Affect] : the affect was normal [Normal Insight/Judgement] : insight and judgment were intact [de-identified] : red patchy rash on lower arms

## 2023-03-10 NOTE — ASSESSMENT
[FreeTextEntry1] : follow up\par \par DM- hba1c 7.1 last visit\par wiill recheck levels\par \par obesity- weight is stable\par \par has a rash on arms- needs  asteroid cream\par \par hld- atorvastatin causing muscle aches- will change to pravastatin\par \par htn- stable\par cont meds\par

## 2023-03-10 NOTE — HEALTH RISK ASSESSMENT
[Yes] : Yes [Never (0 pts)] : Never (0 points) [No] : In the past 12 months have you used drugs other than those required for medical reasons? No [No falls in past year] : Patient reported no falls in the past year [0] : 2) Feeling down, depressed, or hopeless: Not at all (0) [Never] : Never [Audit-CScore] : 1 [de-identified] : walking [de-identified] : healthy [[id number]] : 0

## 2023-03-13 LAB
ALBUMIN SERPL ELPH-MCNC: 4.5 G/DL
ALP BLD-CCNC: 69 U/L
ALT SERPL-CCNC: 21 U/L
ANION GAP SERPL CALC-SCNC: 14 MMOL/L
AST SERPL-CCNC: 22 U/L
BASOPHILS # BLD AUTO: 0.04 K/UL
BASOPHILS NFR BLD AUTO: 0.9 %
BILIRUB SERPL-MCNC: 2 MG/DL
BUN SERPL-MCNC: 12 MG/DL
CALCIUM SERPL-MCNC: 10 MG/DL
CHLORIDE SERPL-SCNC: 97 MMOL/L
CHOLEST SERPL-MCNC: 104 MG/DL
CO2 SERPL-SCNC: 22 MMOL/L
CREAT SERPL-MCNC: 1.02 MG/DL
EGFR: 80 ML/MIN/1.73M2
EOSINOPHIL # BLD AUTO: 0.1 K/UL
EOSINOPHIL NFR BLD AUTO: 2.2 %
ESTIMATED AVERAGE GLUCOSE: 146 MG/DL
FOLATE SERPL-MCNC: 14.7 NG/ML
GLUCOSE SERPL-MCNC: 291 MG/DL
HBA1C MFR BLD HPLC: 6.7 %
HCT VFR BLD CALC: 44.6 %
HDLC SERPL-MCNC: 52 MG/DL
HGB BLD-MCNC: 15.1 G/DL
IMM GRANULOCYTES NFR BLD AUTO: 0.2 %
LDLC SERPL CALC-MCNC: 32 MG/DL
LYMPHOCYTES # BLD AUTO: 1.08 K/UL
LYMPHOCYTES NFR BLD AUTO: 24.2 %
MAN DIFF?: NORMAL
MCHC RBC-ENTMCNC: 30.1 PG
MCHC RBC-ENTMCNC: 33.9 GM/DL
MCV RBC AUTO: 88.8 FL
MONOCYTES # BLD AUTO: 0.35 K/UL
MONOCYTES NFR BLD AUTO: 7.8 %
NEUTROPHILS # BLD AUTO: 2.89 K/UL
NEUTROPHILS NFR BLD AUTO: 64.7 %
NONHDLC SERPL-MCNC: 51 MG/DL
PLATELET # BLD AUTO: 129 K/UL
POTASSIUM SERPL-SCNC: 4.8 MMOL/L
PROT SERPL-MCNC: 6.9 G/DL
RBC # BLD: 5.02 M/UL
RBC # FLD: 12.5 %
SODIUM SERPL-SCNC: 133 MMOL/L
TRIGL SERPL-MCNC: 94 MG/DL
TSH SERPL-ACNC: 2.92 UIU/ML
VIT B12 SERPL-MCNC: 395 PG/ML
WBC # FLD AUTO: 4.47 K/UL

## 2023-04-20 ENCOUNTER — APPOINTMENT (OUTPATIENT)
Dept: INTERNAL MEDICINE | Facility: CLINIC | Age: 68
End: 2023-04-20
Payer: MEDICARE

## 2023-04-20 VITALS
DIASTOLIC BLOOD PRESSURE: 80 MMHG | BODY MASS INDEX: 37.04 KG/M2 | HEIGHT: 67 IN | WEIGHT: 236 LBS | OXYGEN SATURATION: 99 % | TEMPERATURE: 98.3 F | HEART RATE: 74 BPM | SYSTOLIC BLOOD PRESSURE: 162 MMHG

## 2023-04-20 DIAGNOSIS — N39.0 URINARY TRACT INFECTION, SITE NOT SPECIFIED: ICD-10-CM

## 2023-04-20 PROCEDURE — 99213 OFFICE O/P EST LOW 20 MIN: CPT

## 2023-04-20 RX ORDER — CIPROFLOXACIN HYDROCHLORIDE 500 MG/1
500 TABLET, FILM COATED ORAL
Qty: 20 | Refills: 0 | Status: ACTIVE | COMMUNITY
Start: 2023-04-20 | End: 1900-01-01

## 2023-04-20 RX ORDER — CLOTRIMAZOLE AND BETAMETHASONE DIPROPIONATE 10; .5 MG/G; MG/G
1-0.05 CREAM TOPICAL TWICE DAILY
Qty: 1 | Refills: 1 | Status: ACTIVE | COMMUNITY
Start: 2023-04-20 | End: 1900-01-01

## 2023-04-24 LAB
APPEARANCE: ABNORMAL
BACTERIA UR CULT: ABNORMAL
BACTERIA: NEGATIVE /HPF
BILIRUBIN URINE: NEGATIVE
BLOOD URINE: ABNORMAL
CAST: 0 /LPF
COLOR: YELLOW
EPITHELIAL CELLS: 0 /HPF
GLUCOSE QUALITATIVE U: >=1000 MG/DL
KETONES URINE: NEGATIVE MG/DL
LEUKOCYTE ESTERASE URINE: ABNORMAL
MICROSCOPIC-UA: NORMAL
NITRITE URINE: NEGATIVE
PH URINE: 5.5
PROTEIN URINE: 100 MG/DL
RED BLOOD CELLS URINE: 36 /HPF
SPECIFIC GRAVITY URINE: 1.02
UROBILINOGEN URINE: 0.2 MG/DL
WHITE BLOOD CELLS URINE: 400 /HPF

## 2023-04-24 NOTE — HISTORY OF PRESENT ILLNESS
[FreeTextEntry1] : follow up [de-identified] : states that he thinks he has a uti\par has pain with urination

## 2023-04-24 NOTE — HEALTH RISK ASSESSMENT
[Yes] : Yes [4 or more  times a week (4 pts)] : 4 or more  times a week (4 points) [1 or 2 (0 pts)] : 1 or 2 (0 points) [Never (0 pts)] : Never (0 points) [No] : In the past 12 months have you used drugs other than those required for medical reasons? No [No falls in past year] : Patient reported no falls in the past year [0] : 2) Feeling down, depressed, or hopeless: Not at all (0) [de-identified] : No [de-identified] : No [de-identified] : A glass of wine  with dinner  [Audit-CScore] : 4 [de-identified] : Walking [de-identified] : Healthy [[id number]] : 0 [de-identified] : No

## 2023-06-02 ENCOUNTER — LABORATORY RESULT (OUTPATIENT)
Age: 68
End: 2023-06-02

## 2023-06-02 ENCOUNTER — APPOINTMENT (OUTPATIENT)
Dept: INTERNAL MEDICINE | Facility: CLINIC | Age: 68
End: 2023-06-02
Payer: MEDICARE

## 2023-06-02 VITALS
BODY MASS INDEX: 36.88 KG/M2 | SYSTOLIC BLOOD PRESSURE: 148 MMHG | WEIGHT: 235 LBS | DIASTOLIC BLOOD PRESSURE: 78 MMHG | HEART RATE: 79 BPM | TEMPERATURE: 98 F | HEIGHT: 67 IN | OXYGEN SATURATION: 97 %

## 2023-06-02 DIAGNOSIS — Z12.11 ENCOUNTER FOR SCREENING FOR MALIGNANT NEOPLASM OF COLON: ICD-10-CM

## 2023-06-02 DIAGNOSIS — Z00.00 ENCOUNTER FOR GENERAL ADULT MEDICAL EXAMINATION W/OUT ABNORMAL FINDINGS: ICD-10-CM

## 2023-06-02 PROCEDURE — 36415 COLL VENOUS BLD VENIPUNCTURE: CPT

## 2023-06-02 PROCEDURE — G0439: CPT

## 2023-06-02 PROCEDURE — G0444 DEPRESSION SCREEN ANNUAL: CPT | Mod: 59

## 2023-06-02 NOTE — HEALTH RISK ASSESSMENT
[Good] : ~his/her~  mood as  good [Yes] : Yes [2 - 3 times a week (3 pts)] : 2 - 3  times a week (3 points) [1 or 2 (0 pts)] : 1 or 2 (0 points) [Never (0 pts)] : Never (0 points) [No] : In the past 12 months have you used drugs other than those required for medical reasons? No [No falls in past year] : Patient reported no falls in the past year [0] : 2) Feeling down, depressed, or hopeless: Not at all (0) [HIV test declined] : HIV test declined [Hepatitis C test declined] : Hepatitis C test declined [With Family] : lives with family [] :  [Fully functional (bathing, dressing, toileting, transferring, walking, feeding)] : Fully functional (bathing, dressing, toileting, transferring, walking, feeding) [Fully functional (using the telephone, shopping, preparing meals, housekeeping, doing laundry, using] : Fully functional and needs no help or supervision to perform IADLs (using the telephone, shopping, preparing meals, housekeeping, doing laundry, using transportation, managing medications and managing finances) [Smoke Detector] : smoke detector [Carbon Monoxide Detector] : carbon monoxide detector [Seat Belt] :  uses seat belt [Sunscreen] : uses sunscreen [Never] : Never [PHQ-2 Negative - No further assessment needed] : PHQ-2 Negative - No further assessment needed [Audit-CScore] : 3 [de-identified] : walking [de-identified] : fair [de-identified] : pt tripped 3 weeks ago [[id number]] : 0 [Reports changes in hearing] : Reports no changes in hearing [Reports changes in vision] : Reports no changes in vision [Reports changes in dental health] : Reports no changes in dental health [ColonoscopyComments] : will order colongaurd

## 2023-06-02 NOTE — HISTORY OF PRESENT ILLNESS
Detail Level: Zone Detail Level: Simple Detail Level: Detailed [FreeTextEntry1] : annual exam\par  [de-identified] : SHADE CUELLO  67 year M  presents for follow up for chronic medical conditions.\par SHADE compliant with medications and diet restrictions.\par \par DM- well controlled\par \par HTN- improved from last visit\par

## 2023-06-06 LAB
ALBUMIN SERPL ELPH-MCNC: 4.6 G/DL
ALP BLD-CCNC: 68 U/L
ALT SERPL-CCNC: 22 U/L
ANION GAP SERPL CALC-SCNC: 12 MMOL/L
APPEARANCE: CLEAR
AST SERPL-CCNC: 19 U/L
BILIRUB SERPL-MCNC: 1.7 MG/DL
BILIRUBIN URINE: NEGATIVE
BLOOD URINE: NEGATIVE
BUN SERPL-MCNC: 12 MG/DL
CALCIUM SERPL-MCNC: 9.8 MG/DL
CHLORIDE SERPL-SCNC: 98 MMOL/L
CHOLEST SERPL-MCNC: 152 MG/DL
CO2 SERPL-SCNC: 23 MMOL/L
COLOR: NORMAL
CREAT SERPL-MCNC: 1.03 MG/DL
CREAT SPEC-SCNC: 140 MG/DL
EGFR: 79 ML/MIN/1.73M2
ESTIMATED AVERAGE GLUCOSE: 148 MG/DL
FERRITIN SERPL-MCNC: 87 NG/ML
FOLATE SERPL-MCNC: 9.4 NG/ML
GLUCOSE QUALITATIVE U: 100 MG/DL
GLUCOSE SERPL-MCNC: 183 MG/DL
HBA1C MFR BLD HPLC: 6.8 %
HDLC SERPL-MCNC: 67 MG/DL
IRON SATN MFR SERPL: 42 %
IRON SERPL-MCNC: 119 UG/DL
KETONES URINE: NEGATIVE MG/DL
LDLC SERPL CALC-MCNC: 71 MG/DL
LEUKOCYTE ESTERASE URINE: ABNORMAL
MICROALBUMIN 24H UR DL<=1MG/L-MCNC: 4.2 MG/DL
MICROALBUMIN/CREAT 24H UR-RTO: 30 MG/G
NITRITE URINE: NEGATIVE
NONHDLC SERPL-MCNC: 85 MG/DL
PH URINE: 7
POTASSIUM SERPL-SCNC: 5 MMOL/L
PROT SERPL-MCNC: 6.9 G/DL
PROTEIN URINE: 30 MG/DL
PSA SERPL-MCNC: 0.4 NG/ML
SODIUM SERPL-SCNC: 133 MMOL/L
SPECIFIC GRAVITY URINE: 1.02
TIBC SERPL-MCNC: 285 UG/DL
TRIGL SERPL-MCNC: 67 MG/DL
TSH SERPL-ACNC: 4.01 UIU/ML
UIBC SERPL-MCNC: 166 UG/DL
UROBILINOGEN URINE: 1 MG/DL
VIT B12 SERPL-MCNC: 321 PG/ML

## 2023-07-03 ENCOUNTER — NON-APPOINTMENT (OUTPATIENT)
Age: 68
End: 2023-07-03

## 2023-09-08 ENCOUNTER — LABORATORY RESULT (OUTPATIENT)
Age: 68
End: 2023-09-08

## 2023-09-08 ENCOUNTER — APPOINTMENT (OUTPATIENT)
Dept: INTERNAL MEDICINE | Facility: CLINIC | Age: 68
End: 2023-09-08
Payer: MEDICARE

## 2023-09-08 VITALS
SYSTOLIC BLOOD PRESSURE: 136 MMHG | OXYGEN SATURATION: 98 % | DIASTOLIC BLOOD PRESSURE: 68 MMHG | WEIGHT: 233 LBS | HEART RATE: 73 BPM | HEIGHT: 67 IN | BODY MASS INDEX: 36.57 KG/M2 | TEMPERATURE: 97.3 F

## 2023-09-08 DIAGNOSIS — K21.9 GASTRO-ESOPHAGEAL REFLUX DISEASE W/OUT ESOPHAGITIS: ICD-10-CM

## 2023-09-08 DIAGNOSIS — E66.01 MORBID (SEVERE) OBESITY DUE TO EXCESS CALORIES: ICD-10-CM
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
## 2023-09-08 PROCEDURE — 36415 COLL VENOUS BLD VENIPUNCTURE: CPT

## 2023-09-08 PROCEDURE — 99214 OFFICE O/P EST MOD 30 MIN: CPT | Mod: 25

## 2023-09-08 NOTE — ASSESSMENT
[FreeTextEntry1] : SHADE CUELLO  67 year M  presents for follow up for chronic medical conditions. SHADE compliant with medications and diet restrictions.  DM- well controlled  HTN- improved from last visit  gerd- needs refill of omeprazole  had a UTI- treated at [facility] ed  bug bite - still has scar after 2 months

## 2023-09-08 NOTE — HISTORY OF PRESENT ILLNESS
[FreeTextEntry1] : follow up [de-identified] : SHADE CUELLO  67 year M  presents for follow up for chronic medical conditions. SHADE compliant with medications and diet restrictions.  DM- well controlled  HTN- improved from last visit  gerd- needs refill of omeprazole  had a UTI- treated at [facility]

## 2023-09-08 NOTE — HEALTH RISK ASSESSMENT
[Yes] : Yes [4 or more  times a week (4 pts)] : 4 or more  times a week (4 points) [1 or 2 (0 pts)] : 1 or 2 (0 points) [Never (0 pts)] : Never (0 points) [No] : In the past 12 months have you used drugs other than those required for medical reasons? No [No falls in past year] : Patient reported no falls in the past year [0] : 2) Feeling down, depressed, or hopeless: Not at all (0) [Never] : Never [Audit-CScore] : 4 [de-identified] : walking [de-identified] : ok [[id number]] : 0

## 2023-09-11 LAB
ALBUMIN SERPL ELPH-MCNC: 4.6 G/DL
ALP BLD-CCNC: 68 U/L
ALT SERPL-CCNC: 26 U/L
ANION GAP SERPL CALC-SCNC: 17 MMOL/L
APPEARANCE: CLEAR
AST SERPL-CCNC: 23 U/L
BASOPHILS # BLD AUTO: 0.06 K/UL
BASOPHILS NFR BLD AUTO: 1.1 %
BILIRUB SERPL-MCNC: 1.9 MG/DL
BILIRUBIN URINE: NEGATIVE
BLOOD URINE: NEGATIVE
BUN SERPL-MCNC: 12 MG/DL
CALCIUM SERPL-MCNC: 9.5 MG/DL
CHLORIDE SERPL-SCNC: 99 MMOL/L
CHOLEST SERPL-MCNC: 162 MG/DL
CO2 SERPL-SCNC: 16 MMOL/L
COLOR: NORMAL
CREAT SERPL-MCNC: 1.01 MG/DL
EGFR: 81 ML/MIN/1.73M2
EOSINOPHIL # BLD AUTO: 0.1 K/UL
EOSINOPHIL NFR BLD AUTO: 1.9 %
FOLATE SERPL-MCNC: 10.7 NG/ML
GLUCOSE QUALITATIVE U: 100 MG/DL
GLUCOSE SERPL-MCNC: 168 MG/DL
HBA1C MFR BLD HPLC: NORMAL
HCT VFR BLD CALC: 48.3 %
HDLC SERPL-MCNC: 52 MG/DL
HGB BLD-MCNC: 15.7 G/DL
IMM GRANULOCYTES NFR BLD AUTO: 0.2 %
KETONES URINE: ABNORMAL MG/DL
LDLC SERPL CALC-MCNC: 90 MG/DL
LEUKOCYTE ESTERASE URINE: NEGATIVE
LYMPHOCYTES # BLD AUTO: 1.23 K/UL
LYMPHOCYTES NFR BLD AUTO: 22.9 %
MAN DIFF?: NORMAL
MCHC RBC-ENTMCNC: 30 PG
MCHC RBC-ENTMCNC: 32.5 GM/DL
MCV RBC AUTO: 92.4 FL
MONOCYTES # BLD AUTO: 0.47 K/UL
MONOCYTES NFR BLD AUTO: 8.8 %
NEUTROPHILS # BLD AUTO: 3.49 K/UL
NEUTROPHILS NFR BLD AUTO: 65.1 %
NITRITE URINE: NEGATIVE
NONHDLC SERPL-MCNC: 110 MG/DL
PH URINE: 8
PLATELET # BLD AUTO: 152 K/UL
POTASSIUM SERPL-SCNC: 4.4 MMOL/L
PROT SERPL-MCNC: 7.1 G/DL
PROTEIN URINE: 30 MG/DL
RBC # BLD: 5.23 M/UL
RBC # FLD: 13.6 %
SODIUM SERPL-SCNC: 133 MMOL/L
SPECIFIC GRAVITY URINE: 1.02
TRIGL SERPL-MCNC: 109 MG/DL
TSH SERPL-ACNC: 4.37 UIU/ML
UROBILINOGEN URINE: 1 MG/DL
VIT B12 SERPL-MCNC: 321 PG/ML
WBC # FLD AUTO: 5.36 K/UL

## 2023-10-17 ENCOUNTER — APPOINTMENT (OUTPATIENT)
Dept: INTERNAL MEDICINE | Facility: CLINIC | Age: 68
End: 2023-10-17
Payer: MEDICARE

## 2023-10-17 VITALS
WEIGHT: 238 LBS | RESPIRATION RATE: 16 BRPM | HEART RATE: 76 BPM | BODY MASS INDEX: 37.35 KG/M2 | DIASTOLIC BLOOD PRESSURE: 66 MMHG | TEMPERATURE: 97.6 F | HEIGHT: 67 IN | SYSTOLIC BLOOD PRESSURE: 162 MMHG | OXYGEN SATURATION: 99 %

## 2023-10-17 DIAGNOSIS — F51.01 PRIMARY INSOMNIA: ICD-10-CM

## 2023-10-17 DIAGNOSIS — R47.81 SLURRED SPEECH: ICD-10-CM

## 2023-10-17 DIAGNOSIS — Z78.9 OTHER SPECIFIED HEALTH STATUS: ICD-10-CM

## 2023-10-17 PROCEDURE — 99214 OFFICE O/P EST MOD 30 MIN: CPT

## 2023-10-17 RX ORDER — FLUTICASONE PROPIONATE AND SALMETEROL 50; 100 UG/1; UG/1
100-50 POWDER RESPIRATORY (INHALATION)
Qty: 1 | Refills: 0 | Status: DISCONTINUED | COMMUNITY
Start: 2022-11-15 | End: 2023-10-17

## 2023-12-05 ENCOUNTER — APPOINTMENT (OUTPATIENT)
Dept: CARDIOLOGY | Facility: CLINIC | Age: 68
End: 2023-12-05
Payer: MEDICARE

## 2023-12-05 ENCOUNTER — NON-APPOINTMENT (OUTPATIENT)
Age: 68
End: 2023-12-05

## 2023-12-05 VITALS
HEIGHT: 67 IN | BODY MASS INDEX: 37.35 KG/M2 | OXYGEN SATURATION: 99 % | TEMPERATURE: 98.4 F | SYSTOLIC BLOOD PRESSURE: 154 MMHG | DIASTOLIC BLOOD PRESSURE: 71 MMHG | HEART RATE: 69 BPM | WEIGHT: 238 LBS

## 2023-12-05 PROCEDURE — 93000 ELECTROCARDIOGRAM COMPLETE: CPT | Mod: 59

## 2023-12-05 PROCEDURE — 99205 OFFICE O/P NEW HI 60 MIN: CPT

## 2023-12-05 PROCEDURE — 93242 EXT ECG>48HR<7D RECORDING: CPT

## 2023-12-05 RX ORDER — AMLODIPINE BESYLATE 10 MG/1
10 TABLET ORAL DAILY
Qty: 90 | Refills: 1 | Status: DISCONTINUED | COMMUNITY
Start: 2022-04-03 | End: 2023-12-05

## 2023-12-05 RX ORDER — LISINOPRIL 5 MG/1
5 TABLET ORAL
Qty: 90 | Refills: 1 | Status: DISCONTINUED | COMMUNITY
Start: 2022-08-05 | End: 2023-12-05

## 2023-12-05 RX ORDER — ROSUVASTATIN CALCIUM 20 MG/1
20 TABLET, FILM COATED ORAL
Qty: 90 | Refills: 3 | Status: ACTIVE | COMMUNITY
Start: 2023-12-05 | End: 1900-01-01

## 2023-12-05 RX ORDER — LOSARTAN POTASSIUM 100 MG/1
100 TABLET, FILM COATED ORAL DAILY
Qty: 90 | Refills: 3 | Status: ACTIVE | COMMUNITY
Start: 2023-12-05 | End: 1900-01-01

## 2023-12-05 RX ORDER — HYDRALAZINE HYDROCHLORIDE 50 MG/1
50 TABLET ORAL TWICE DAILY
Qty: 180 | Refills: 1 | Status: DISCONTINUED | COMMUNITY
Start: 2022-01-05 | End: 2023-12-05

## 2023-12-05 RX ORDER — NEBIVOLOL 5 MG/1
5 TABLET ORAL DAILY
Qty: 90 | Refills: 3 | Status: ACTIVE | COMMUNITY
Start: 2022-01-05 | End: 1900-01-01

## 2023-12-05 RX ORDER — PRAVASTATIN SODIUM 40 MG/1
40 TABLET ORAL
Qty: 90 | Refills: 1 | Status: DISCONTINUED | COMMUNITY
Start: 2023-03-10 | End: 2023-12-05

## 2023-12-18 ENCOUNTER — NON-APPOINTMENT (OUTPATIENT)
Age: 68
End: 2023-12-18

## 2023-12-18 ENCOUNTER — APPOINTMENT (OUTPATIENT)
Dept: CARDIOLOGY | Facility: CLINIC | Age: 68
End: 2023-12-18
Payer: MEDICARE

## 2023-12-18 PROCEDURE — 93015 CV STRESS TEST SUPVJ I&R: CPT

## 2023-12-18 PROCEDURE — 78452 HT MUSCLE IMAGE SPECT MULT: CPT

## 2023-12-18 PROCEDURE — A9500: CPT

## 2023-12-19 ENCOUNTER — APPOINTMENT (OUTPATIENT)
Dept: CARDIOLOGY | Facility: CLINIC | Age: 68
End: 2023-12-19
Payer: MEDICARE

## 2023-12-19 PROCEDURE — 93306 TTE W/DOPPLER COMPLETE: CPT

## 2023-12-21 ENCOUNTER — APPOINTMENT (OUTPATIENT)
Dept: INTERNAL MEDICINE | Facility: CLINIC | Age: 68
End: 2023-12-21
Payer: MEDICARE

## 2023-12-21 VITALS — SYSTOLIC BLOOD PRESSURE: 150 MMHG | OXYGEN SATURATION: 99 % | DIASTOLIC BLOOD PRESSURE: 100 MMHG | HEART RATE: 63 BPM

## 2023-12-21 PROCEDURE — 99211 OFF/OP EST MAY X REQ PHY/QHP: CPT

## 2024-01-03 ENCOUNTER — APPOINTMENT (OUTPATIENT)
Dept: INTERNAL MEDICINE | Facility: CLINIC | Age: 69
End: 2024-01-03
Payer: MEDICARE

## 2024-01-03 VITALS
WEIGHT: 235 LBS | SYSTOLIC BLOOD PRESSURE: 167 MMHG | DIASTOLIC BLOOD PRESSURE: 71 MMHG | BODY MASS INDEX: 36.88 KG/M2 | HEIGHT: 67 IN | HEART RATE: 70 BPM | TEMPERATURE: 97.4 F | OXYGEN SATURATION: 100 %

## 2024-01-03 DIAGNOSIS — R35.0 FREQUENCY OF MICTURITION: ICD-10-CM

## 2024-01-03 DIAGNOSIS — R10.31 RIGHT LOWER QUADRANT PAIN: ICD-10-CM

## 2024-01-03 PROCEDURE — G2211 COMPLEX E/M VISIT ADD ON: CPT

## 2024-01-03 PROCEDURE — 36415 COLL VENOUS BLD VENIPUNCTURE: CPT

## 2024-01-03 PROCEDURE — 99214 OFFICE O/P EST MOD 30 MIN: CPT | Mod: 25

## 2024-01-03 RX ORDER — CIPROFLOXACIN HYDROCHLORIDE 500 MG/1
500 TABLET, FILM COATED ORAL
Qty: 14 | Refills: 0 | Status: ACTIVE | COMMUNITY
Start: 2024-01-03 | End: 1900-01-01

## 2024-01-03 RX ORDER — CLOTRIMAZOLE AND BETAMETHASONE DIPROPIONATE 10; .5 MG/G; MG/G
1-0.05 CREAM TOPICAL TWICE DAILY
Qty: 1 | Refills: 0 | Status: ACTIVE | COMMUNITY
Start: 2024-01-03 | End: 1900-01-01

## 2024-01-03 NOTE — HISTORY OF PRESENT ILLNESS
[FreeTextEntry8] : patient c/o increased frequency of urination and constipation and pain on left groin area +foamy urine and looks like an oil slick on top of the urine RLQ abdominal pain

## 2024-01-03 NOTE — REVIEW OF SYSTEMS
[Abdominal Pain] : abdominal pain [Frequency] : frequency [Negative] : Heme/Lymph [FreeTextEntry8] : fungal rash

## 2024-01-03 NOTE — HEALTH RISK ASSESSMENT
[Yes] : Yes [2 - 3 times a week (3 pts)] : 2 - 3  times a week (3 points) [1 or 2 (0 pts)] : 1 or 2 (0 points) [No] : In the past 12 months have you used drugs other than those required for medical reasons? No [No falls in past year] : Patient reported no falls in the past year [0] : 2) Feeling down, depressed, or hopeless: Not at all (0) [de-identified] : No [de-identified] : Cardiologist Dr. Gardner [de-identified] : Wine [Audit-CScore] : 3 [de-identified] : Walking [de-identified] : Regular  [de-identified] : No

## 2024-01-03 NOTE — ASSESSMENT
[FreeTextEntry1] : urine frequency- UTI- will get a culture and urine studies will treat with cipro for now  RLQ abdominal pain with constipation will order CT abd pelvis rule out appendicitis or hernia  htn- elevated- increase amlodipine to 10mg daily  DM- will recheck hba1c

## 2024-01-03 NOTE — PHYSICAL EXAM
[No Acute Distress] : no acute distress [Well Nourished] : well nourished [Well Developed] : well developed [Well-Appearing] : well-appearing [Normal Sclera/Conjunctiva] : normal sclera/conjunctiva [PERRL] : pupils equal round and reactive to light [EOMI] : extraocular movements intact [Normal Outer Ear/Nose] : the outer ears and nose were normal in appearance [Normal Oropharynx] : the oropharynx was normal [No JVD] : no jugular venous distention [No Lymphadenopathy] : no lymphadenopathy [Supple] : supple [Thyroid Normal, No Nodules] : the thyroid was normal and there were no nodules present [No Respiratory Distress] : no respiratory distress  [No Accessory Muscle Use] : no accessory muscle use [Clear to Auscultation] : lungs were clear to auscultation bilaterally [Normal Rate] : normal rate  [Regular Rhythm] : with a regular rhythm [Normal S1, S2] : normal S1 and S2 [No Murmur] : no murmur heard [No Carotid Bruits] : no carotid bruits [No Abdominal Bruit] : a ~M bruit was not heard ~T in the abdomen [No Varicosities] : no varicosities [Pedal Pulses Present] : the pedal pulses are present [No Edema] : there was no peripheral edema [No Palpable Aorta] : no palpable aorta [No Extremity Clubbing/Cyanosis] : no extremity clubbing/cyanosis [Soft] : abdomen soft [Non-distended] : non-distended [No Masses] : no abdominal mass palpated [No HSM] : no HSM [Normal Bowel Sounds] : normal bowel sounds [Normal Posterior Cervical Nodes] : no posterior cervical lymphadenopathy [Normal Anterior Cervical Nodes] : no anterior cervical lymphadenopathy [No CVA Tenderness] : no CVA  tenderness [No Spinal Tenderness] : no spinal tenderness [No Joint Swelling] : no joint swelling [Grossly Normal Strength/Tone] : grossly normal strength/tone [No Rash] : no rash [Coordination Grossly Intact] : coordination grossly intact [No Focal Deficits] : no focal deficits [Normal Gait] : normal gait [Deep Tendon Reflexes (DTR)] : deep tendon reflexes were 2+ and symmetric [Normal Affect] : the affect was normal [Normal Insight/Judgement] : insight and judgment were intact [de-identified] : MOderate RLQ tenderness to palpation

## 2024-01-05 ENCOUNTER — NON-APPOINTMENT (OUTPATIENT)
Age: 69
End: 2024-01-05

## 2024-01-05 LAB
ALBUMIN SERPL ELPH-MCNC: 4.5 G/DL
ALP BLD-CCNC: 76 U/L
ALT SERPL-CCNC: 15 U/L
ANION GAP SERPL CALC-SCNC: 11 MMOL/L
APPEARANCE: CLEAR
AST SERPL-CCNC: 16 U/L
BACTERIA UR CULT: NORMAL
BACTERIA: NEGATIVE /HPF
BASOPHILS # BLD AUTO: 0.04 K/UL
BASOPHILS NFR BLD AUTO: 0.8 %
BILIRUB SERPL-MCNC: 1.7 MG/DL
BILIRUBIN URINE: NEGATIVE
BLOOD URINE: NEGATIVE
BUN SERPL-MCNC: 9 MG/DL
CALCIUM SERPL-MCNC: 9.6 MG/DL
CAST: 0 /LPF
CHLORIDE SERPL-SCNC: 97 MMOL/L
CHOLEST SERPL-MCNC: 115 MG/DL
CO2 SERPL-SCNC: 26 MMOL/L
COLOR: YELLOW
CREAT SERPL-MCNC: 0.97 MG/DL
EGFR: 85 ML/MIN/1.73M2
EOSINOPHIL # BLD AUTO: 0.11 K/UL
EOSINOPHIL NFR BLD AUTO: 2.1 %
EPITHELIAL CELLS: 0 /HPF
ESTIMATED AVERAGE GLUCOSE: 177 MG/DL
FERRITIN SERPL-MCNC: 99 NG/ML
FOLATE SERPL-MCNC: 10.8 NG/ML
GLUCOSE QUALITATIVE U: 500 MG/DL
GLUCOSE SERPL-MCNC: 231 MG/DL
HBA1C MFR BLD HPLC: 7.8 %
HCT VFR BLD CALC: 45 %
HDLC SERPL-MCNC: 50 MG/DL
HGB BLD-MCNC: 15.4 G/DL
IMM GRANULOCYTES NFR BLD AUTO: 0.2 %
IRON SATN MFR SERPL: 28 %
IRON SERPL-MCNC: 81 UG/DL
KETONES URINE: NEGATIVE MG/DL
LDLC SERPL CALC-MCNC: 47 MG/DL
LEUKOCYTE ESTERASE URINE: NEGATIVE
LYMPHOCYTES # BLD AUTO: 1.16 K/UL
LYMPHOCYTES NFR BLD AUTO: 22.4 %
MAN DIFF?: NORMAL
MCHC RBC-ENTMCNC: 28.9 PG
MCHC RBC-ENTMCNC: 34.2 GM/DL
MCV RBC AUTO: 84.4 FL
MICROSCOPIC-UA: NORMAL
MONOCYTES # BLD AUTO: 0.35 K/UL
MONOCYTES NFR BLD AUTO: 6.7 %
NEUTROPHILS # BLD AUTO: 3.52 K/UL
NEUTROPHILS NFR BLD AUTO: 67.8 %
NITRITE URINE: NEGATIVE
NONHDLC SERPL-MCNC: 64 MG/DL
PH URINE: 7.5
PLATELET # BLD AUTO: 170 K/UL
POTASSIUM SERPL-SCNC: 5.3 MMOL/L
PROT SERPL-MCNC: 7 G/DL
PROTEIN URINE: 30 MG/DL
RBC # BLD: 5.33 M/UL
RBC # FLD: 12.5 %
RED BLOOD CELLS URINE: 0 /HPF
SODIUM SERPL-SCNC: 133 MMOL/L
SPECIFIC GRAVITY URINE: 1.01
TIBC SERPL-MCNC: 285 UG/DL
TRIGL SERPL-MCNC: 87 MG/DL
TSH SERPL-ACNC: 3.31 UIU/ML
UIBC SERPL-MCNC: 204 UG/DL
UROBILINOGEN URINE: 1 MG/DL
VIT B12 SERPL-MCNC: 468 PG/ML
WBC # FLD AUTO: 5.19 K/UL
WHITE BLOOD CELLS URINE: 0 /HPF

## 2024-01-09 ENCOUNTER — APPOINTMENT (OUTPATIENT)
Dept: INTERNAL MEDICINE | Facility: CLINIC | Age: 69
End: 2024-01-09
Payer: MEDICARE

## 2024-01-09 VITALS
HEIGHT: 67 IN | DIASTOLIC BLOOD PRESSURE: 81 MMHG | SYSTOLIC BLOOD PRESSURE: 154 MMHG | TEMPERATURE: 97.3 F | WEIGHT: 235 LBS | OXYGEN SATURATION: 99 % | BODY MASS INDEX: 36.88 KG/M2 | HEART RATE: 70 BPM

## 2024-01-09 DIAGNOSIS — K57.32 DIVERTICULITIS OF LARGE INTESTINE W/OUT PERFORATION OR ABSCESS W/OUT BLEEDING: ICD-10-CM

## 2024-01-09 PROCEDURE — G2211 COMPLEX E/M VISIT ADD ON: CPT

## 2024-01-09 PROCEDURE — 99213 OFFICE O/P EST LOW 20 MIN: CPT

## 2024-01-09 RX ORDER — METRONIDAZOLE 375 MG/1
375 CAPSULE ORAL
Qty: 14 | Refills: 0 | Status: ACTIVE | COMMUNITY
Start: 2024-01-09 | End: 1900-01-01

## 2024-01-15 ENCOUNTER — APPOINTMENT (OUTPATIENT)
Dept: CARDIOLOGY | Facility: CLINIC | Age: 69
End: 2024-01-15

## 2024-01-15 VITALS
HEIGHT: 67 IN | WEIGHT: 238 LBS | DIASTOLIC BLOOD PRESSURE: 70 MMHG | HEART RATE: 70 BPM | OXYGEN SATURATION: 99 % | BODY MASS INDEX: 37.35 KG/M2 | SYSTOLIC BLOOD PRESSURE: 130 MMHG

## 2024-01-15 PROCEDURE — G2211 COMPLEX E/M VISIT ADD ON: CPT

## 2024-01-15 PROCEDURE — 93000 ELECTROCARDIOGRAM COMPLETE: CPT

## 2024-01-15 PROCEDURE — 99214 OFFICE O/P EST MOD 30 MIN: CPT

## 2024-01-18 ENCOUNTER — APPOINTMENT (OUTPATIENT)
Dept: SURGERY | Facility: CLINIC | Age: 69
End: 2024-01-18
Payer: MEDICARE

## 2024-01-18 VITALS
BODY MASS INDEX: 36.57 KG/M2 | WEIGHT: 233 LBS | HEIGHT: 67 IN | TEMPERATURE: 97.7 F | OXYGEN SATURATION: 100 % | SYSTOLIC BLOOD PRESSURE: 136 MMHG | DIASTOLIC BLOOD PRESSURE: 78 MMHG | HEART RATE: 61 BPM

## 2024-01-18 PROCEDURE — 99204 OFFICE O/P NEW MOD 45 MIN: CPT

## 2024-01-18 RX ORDER — CIPROFLOXACIN HYDROCHLORIDE 500 MG/1
500 TABLET, FILM COATED ORAL
Qty: 28 | Refills: 0 | Status: ACTIVE | COMMUNITY
Start: 2024-01-18 | End: 1900-01-01

## 2024-01-18 RX ORDER — METRONIDAZOLE 500 MG/1
500 TABLET ORAL EVERY 8 HOURS
Qty: 42 | Refills: 0 | Status: ACTIVE | COMMUNITY
Start: 2024-01-18 | End: 1900-01-01

## 2024-01-18 NOTE — HISTORY OF PRESENT ILLNESS
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 Has had for many months, has gotten worse over past month. CT scan revealed sigmoid diverticulitis (Hinchey 0), cholelithiasis, and choledocholithiasis. Patient also found to have a mild elevation in bilirubin to 1.7 on CMP performed 1/3/24. He has completed one week of cipro followed by one week of flagyl. Today Mr. Adams reports that his pain is much improved. He states that it comes an goes, is mild. He finds that he actually has two areas of pain, one in the pelvis on the right side, which is the pain that improved with antibiotics, and one under the ribs on the right side, which he notes is sometimes worse after eating but is always self resolved.  Denies weight loss/gain, denies jaundice, denies nausea/vomiting, denies headaches.  Has never had colonoscopy, has been using colo-guard. Had endoscopy a few years ago for GI bleed, is not sure what results of that were and not sure who performed it.  AVSS No acute distress No jaundice or scleral icterus Non labored respirations Abdomen is soft, non distended, no hernias. Negative Landry's sign. Patient has some mild tenderness with deep palpation of RLQ. No skin changes or scars.  Patient with mild diverticulitis and ongoing, but improved, symptoms as well as choledocholithiasis and biliary colic.  I spoke with patient at length about each of these diagnoses and counseled him on when to seek emergent medical attention. Will resume cipro/flagyl fo 2 weeks and have patient follow up with me after. I have referred. Mr. Adams to Dr. Simone Cisneros for evaluation of choledocholithiasis.

## 2024-02-02 RX ORDER — AMLODIPINE BESYLATE 10 MG/1
10 TABLET ORAL DAILY
Qty: 90 | Refills: 1 | Status: ACTIVE | COMMUNITY
Start: 2023-12-21 | End: 1900-01-01

## 2024-02-27 RX ORDER — LINAGLIPTIN 5 MG/1
5 TABLET, FILM COATED ORAL DAILY
Qty: 90 | Refills: 1 | Status: ACTIVE | COMMUNITY
Start: 2022-04-03 | End: 1900-01-01

## 2024-02-27 RX ORDER — OMEPRAZOLE 20 MG/1
20 CAPSULE, DELAYED RELEASE ORAL
Qty: 90 | Refills: 1 | Status: ACTIVE | COMMUNITY
Start: 2023-03-10 | End: 1900-01-01

## 2024-05-14 ENCOUNTER — APPOINTMENT (OUTPATIENT)
Dept: INTERNAL MEDICINE | Facility: CLINIC | Age: 69
End: 2024-05-14
Payer: MEDICARE

## 2024-05-14 VITALS
DIASTOLIC BLOOD PRESSURE: 77 MMHG | HEART RATE: 65 BPM | BODY MASS INDEX: 35.31 KG/M2 | OXYGEN SATURATION: 99 % | HEIGHT: 67 IN | WEIGHT: 225 LBS | TEMPERATURE: 97.3 F | SYSTOLIC BLOOD PRESSURE: 148 MMHG

## 2024-05-14 DIAGNOSIS — R79.89 OTHER SPECIFIED ABNORMAL FINDINGS OF BLOOD CHEMISTRY: ICD-10-CM

## 2024-05-14 DIAGNOSIS — E11.9 TYPE 2 DIABETES MELLITUS W/OUT COMPLICATIONS: ICD-10-CM

## 2024-05-14 DIAGNOSIS — I10 ESSENTIAL (PRIMARY) HYPERTENSION: ICD-10-CM

## 2024-05-14 PROCEDURE — 99214 OFFICE O/P EST MOD 30 MIN: CPT

## 2024-05-14 PROCEDURE — G2211 COMPLEX E/M VISIT ADD ON: CPT

## 2024-05-14 PROCEDURE — 36415 COLL VENOUS BLD VENIPUNCTURE: CPT

## 2024-05-14 NOTE — HISTORY OF PRESENT ILLNESS
[FreeTextEntry1] : follow up   [de-identified] : follow up  abdominal discomfort- discussed- likely gallstones discussed options such as diet control or surgery  DM- last haba1c 7.8  HTN- slight elevated

## 2024-05-14 NOTE — HEALTH RISK ASSESSMENT
[Yes] : Yes [1 or 2 (0 pts)] : 1 or 2 (0 points) [Never (0 pts)] : Never (0 points) [No] : In the past 12 months have you used drugs other than those required for medical reasons? No [No falls in past year] : Patient reported no falls in the past year [0] : 2) Feeling down, depressed, or hopeless: Not at all (0) [Never] : Never [Audit-CScore] : 3 [de-identified] : healthy [[id number]] : 0

## 2024-05-14 NOTE — ASSESSMENT
[FreeTextEntry1] : follow up  abdominal discomfort- discussed- likely gallstones discussed options such as diet control or surgery  DM- last haba1c 7.8  HTN- slight elevated improved on repeat    Blood work drawn in office today

## 2024-05-16 LAB
25(OH)D3 SERPL-MCNC: 28.6 NG/ML
ALBUMIN SERPL ELPH-MCNC: 4.3 G/DL
ALP BLD-CCNC: 338 U/L
ALT SERPL-CCNC: 42 U/L
ANION GAP SERPL CALC-SCNC: 14 MMOL/L
AST SERPL-CCNC: 37 U/L
BILIRUB SERPL-MCNC: 1.9 MG/DL
BUN SERPL-MCNC: 11 MG/DL
CALCIUM SERPL-MCNC: 9.8 MG/DL
CHLORIDE SERPL-SCNC: 95 MMOL/L
CHOLEST SERPL-MCNC: 128 MG/DL
CO2 SERPL-SCNC: 23 MMOL/L
CREAT SERPL-MCNC: 1.14 MG/DL
EGFR: 70 ML/MIN/1.73M2
ESTIMATED AVERAGE GLUCOSE: 217 MG/DL
FERRITIN SERPL-MCNC: 214 NG/ML
FOLATE SERPL-MCNC: 9 NG/ML
GGT SERPL-CCNC: 1072 U/L
GLUCOSE SERPL-MCNC: 218 MG/DL
HBA1C MFR BLD HPLC: 9.2 %
HDLC SERPL-MCNC: 54 MG/DL
IRON SATN MFR SERPL: 30 %
IRON SERPL-MCNC: 86 UG/DL
LDLC SERPL CALC-MCNC: 58 MG/DL
NONHDLC SERPL-MCNC: 75 MG/DL
POTASSIUM SERPL-SCNC: 5.9 MMOL/L
PROT SERPL-MCNC: 7.2 G/DL
SODIUM SERPL-SCNC: 133 MMOL/L
TIBC SERPL-MCNC: 289 UG/DL
TRIGL SERPL-MCNC: 89 MG/DL
TSH SERPL-ACNC: 3.84 UIU/ML
UIBC SERPL-MCNC: 203 UG/DL
URATE SERPL-MCNC: 2.2 MG/DL
VIT B12 SERPL-MCNC: 492 PG/ML

## 2024-05-16 RX ORDER — METFORMIN HYDROCHLORIDE 500 MG/1
500 TABLET, COATED ORAL
Qty: 180 | Refills: 1 | Status: ACTIVE | COMMUNITY
Start: 2022-12-09 | End: 1900-01-01

## 2024-06-06 ENCOUNTER — APPOINTMENT (OUTPATIENT)
Dept: SURGERY | Facility: CLINIC | Age: 69
End: 2024-06-06
Payer: MEDICARE

## 2024-06-06 ENCOUNTER — APPOINTMENT (OUTPATIENT)
Dept: INTERNAL MEDICINE | Facility: CLINIC | Age: 69
End: 2024-06-06
Payer: MEDICARE

## 2024-06-06 VITALS
BODY MASS INDEX: 35 KG/M2 | DIASTOLIC BLOOD PRESSURE: 78 MMHG | SYSTOLIC BLOOD PRESSURE: 148 MMHG | HEIGHT: 67 IN | WEIGHT: 223 LBS | HEART RATE: 68 BPM | OXYGEN SATURATION: 99 % | TEMPERATURE: 97.4 F

## 2024-06-06 VITALS — DIASTOLIC BLOOD PRESSURE: 76 MMHG | SYSTOLIC BLOOD PRESSURE: 139 MMHG

## 2024-06-06 DIAGNOSIS — K80.50 CALCULUS OF BILE DUCT W/OUT CHOLANGITIS OR CHOLECYSTITIS W/OUT OBSTRUCTION: ICD-10-CM

## 2024-06-06 DIAGNOSIS — R79.89 OTHER SPECIFIED ABNORMAL FINDINGS OF BLOOD CHEMISTRY: ICD-10-CM

## 2024-06-06 PROCEDURE — G2211 COMPLEX E/M VISIT ADD ON: CPT

## 2024-06-06 PROCEDURE — 99213 OFFICE O/P EST LOW 20 MIN: CPT

## 2024-06-06 PROCEDURE — 99212 OFFICE O/P EST SF 10 MIN: CPT

## 2024-06-06 PROCEDURE — 36415 COLL VENOUS BLD VENIPUNCTURE: CPT

## 2024-06-06 NOTE — PHYSICAL EXAM
[No Acute Distress] : no acute distress [Well Nourished] : well nourished [Well Developed] : well developed [Well-Appearing] : well-appearing [Normal Sclera/Conjunctiva] : normal sclera/conjunctiva [PERRL] : pupils equal round and reactive to light [EOMI] : extraocular movements intact [Normal Outer Ear/Nose] : the outer ears and nose were normal in appearance [Normal Oropharynx] : the oropharynx was normal [No JVD] : no jugular venous distention [No Lymphadenopathy] : no lymphadenopathy [Supple] : supple [Thyroid Normal, No Nodules] : the thyroid was normal and there were no nodules present [No Respiratory Distress] : no respiratory distress  [No Accessory Muscle Use] : no accessory muscle use [Clear to Auscultation] : lungs were clear to auscultation bilaterally [Normal Rate] : normal rate  [Regular Rhythm] : with a regular rhythm [Normal S1, S2] : normal S1 and S2 [No Murmur] : no murmur heard [No Carotid Bruits] : no carotid bruits [No Abdominal Bruit] : a ~M bruit was not heard ~T in the abdomen [No Varicosities] : no varicosities [Pedal Pulses Present] : the pedal pulses are present [No Edema] : there was no peripheral edema [No Palpable Aorta] : no palpable aorta [No Extremity Clubbing/Cyanosis] : no extremity clubbing/cyanosis [Soft] : abdomen soft [Non Tender] : non-tender [Non-distended] : non-distended [No Masses] : no abdominal mass palpated [No HSM] : no HSM [Normal Bowel Sounds] : normal bowel sounds [Normal Posterior Cervical Nodes] : no posterior cervical lymphadenopathy [Normal Anterior Cervical Nodes] : no anterior cervical lymphadenopathy [No CVA Tenderness] : no CVA  tenderness [No Spinal Tenderness] : no spinal tenderness [No Joint Swelling] : no joint swelling [Grossly Normal Strength/Tone] : grossly normal strength/tone [No Rash] : no rash [Coordination Grossly Intact] : coordination grossly intact [No Focal Deficits] : no focal deficits [Normal Gait] : normal gait [Deep Tendon Reflexes (DTR)] : deep tendon reflexes were 2+ and symmetric [Normal Affect] : the affect was normal [Normal Insight/Judgement] : insight and judgment were intact [de-identified] : mild positive ramirez sign

## 2024-06-06 NOTE — HISTORY OF PRESENT ILLNESS
[de-identified] : 69 yo male with history of CVA in 2019 with residual right sided weakness and mild aphasia, as well has history of DMII and HTN both well controlled, presenting for evaluation of abdominal pain. Has had for many months, has gotten worse over past month. CT scan revealed sigmoid diverticulitis (Hinchey 0), cholelithiasis, and choledocholithiasis. Patient also found to have a mild elevation in bilirubin to 1.7 on CMP performed 1/3/24. He has completed one week of cipro followed by one week of flagyl. Today Mr. Adams reports that his pain is much improved. He states that it comes an goes, is mild. He finds that he actually has two areas of pain, one in the pelvis on the right side, which is the pain that improved with antibiotics, and one under the ribs on the right side, which he notes is sometimes worse after eating but is always self resolved.  Denies weight loss/gain, denies jaundice, denies nausea/vomiting, denies headaches.  Has never had colonoscopy, has been using colo-guard. Had endoscopy a few years ago for GI bleed, is not sure what results of that were and not sure who performed it.  AVSS No acute distress No jaundice or scleral icterus Non labored respirations Abdomen is soft, non distended, no hernias. Negative Landry's sign. Patient has some mild tenderness with deep palpation of RLQ. No skin changes or scars.  Patient with mild diverticulitis and ongoing, but improved, symptoms as well as choledocholithiasis and biliary colic.  I spoke with patient at length about each of these diagnoses and counseled him on when to seek emergent medical attention. Will resume cipro/flagyl fo 2 weeks and have patient follow up with me after. I have referred. Mr. Adams to Dr. Simone Cisneros for evaluation of choledocholithiasis.  [de-identified] : 6/6/24: Having more episodes of pain now, sometimes with night sweats (about twice per week).  AVSS No jaundice Abdomen soft, non tender, non distended Have re-referred patient to GI for evaluation for ERCP, plan for lap angie thereafter. I have stressed the importance of follow up. Will call patient in a week to check in on progress of setting up GI eval.

## 2024-06-06 NOTE — ASSESSMENT
[FreeTextEntry1] : follow up  elevated LFTs GGT >1000 alk phos elevated CT scan showed choledocalithiasis increasing RUQ abdominal pain  will refer back to surgeon and recheck levels

## 2024-06-06 NOTE — HISTORY OF PRESENT ILLNESS
[FreeTextEntry1] : follow up [de-identified] : follow up  elevated LFTs GGT >1000 alk phos elevated CT scan showed choledocalithiasis increasing RUQ abdominal pain  will refer back to surgeon and recheck levels

## 2024-06-06 NOTE — HEALTH RISK ASSESSMENT
[Yes] : Yes [2 - 3 times a week (3 pts)] : 2 - 3  times a week (3 points) [1 or 2 (0 pts)] : 1 or 2 (0 points) [Never (0 pts)] : Never (0 points) [No] : In the past 12 months have you used drugs other than those required for medical reasons? No [No falls in past year] : Patient reported no falls in the past year [0] : 2) Feeling down, depressed, or hopeless: Not at all (0) [Never] : Never [de-identified] : no [de-identified] : no [Audit-CScore] : 3 [de-identified] : a lot of walking  [de-identified] : good

## 2024-06-10 LAB
ALBUMIN SERPL ELPH-MCNC: 4.4 G/DL
ALP BLD-CCNC: 197 U/L
ALT SERPL-CCNC: 35 U/L
ANION GAP SERPL CALC-SCNC: 12 MMOL/L
AST SERPL-CCNC: 24 U/L
BILIRUB SERPL-MCNC: 2.8 MG/DL
BUN SERPL-MCNC: 16 MG/DL
CALCIUM SERPL-MCNC: 10 MG/DL
CHLORIDE SERPL-SCNC: 97 MMOL/L
CO2 SERPL-SCNC: 24 MMOL/L
CREAT SERPL-MCNC: 1.01 MG/DL
EGFR: 81 ML/MIN/1.73M2
GGT SERPL-CCNC: 755 U/L
GLUCOSE SERPL-MCNC: 200 MG/DL
POTASSIUM SERPL-SCNC: 5.4 MMOL/L
PROT SERPL-MCNC: 7.1 G/DL
SODIUM SERPL-SCNC: 133 MMOL/L

## 2024-06-27 ENCOUNTER — APPOINTMENT (OUTPATIENT)
Dept: SURGERY | Facility: CLINIC | Age: 69
End: 2024-06-27
Payer: MEDICARE

## 2024-06-27 VITALS
SYSTOLIC BLOOD PRESSURE: 129 MMHG | DIASTOLIC BLOOD PRESSURE: 96 MMHG | OXYGEN SATURATION: 100 % | WEIGHT: 217 LBS | BODY MASS INDEX: 34.06 KG/M2 | HEART RATE: 83 BPM | HEIGHT: 67 IN | TEMPERATURE: 98.2 F

## 2024-06-27 PROCEDURE — 99212 OFFICE O/P EST SF 10 MIN: CPT

## 2024-07-08 ENCOUNTER — NON-APPOINTMENT (OUTPATIENT)
Age: 69
End: 2024-07-08

## 2024-07-08 ENCOUNTER — INPATIENT (INPATIENT)
Facility: HOSPITAL | Age: 69
LOS: 2 days | Discharge: HOME | End: 2024-07-11
Attending: STUDENT IN AN ORGANIZED HEALTH CARE EDUCATION/TRAINING PROGRAM | Admitting: STUDENT IN AN ORGANIZED HEALTH CARE EDUCATION/TRAINING PROGRAM
Payer: MEDICARE

## 2024-07-08 ENCOUNTER — APPOINTMENT (OUTPATIENT)
Dept: INTERNAL MEDICINE | Facility: CLINIC | Age: 69
End: 2024-07-08
Payer: MEDICARE

## 2024-07-08 ENCOUNTER — LABORATORY RESULT (OUTPATIENT)
Age: 69
End: 2024-07-08

## 2024-07-08 VITALS
DIASTOLIC BLOOD PRESSURE: 77 MMHG | SYSTOLIC BLOOD PRESSURE: 152 MMHG | WEIGHT: 217 LBS | BODY MASS INDEX: 34.06 KG/M2 | OXYGEN SATURATION: 99 % | HEIGHT: 67 IN | TEMPERATURE: 98.3 F | HEART RATE: 65 BPM

## 2024-07-08 VITALS
TEMPERATURE: 98 F | SYSTOLIC BLOOD PRESSURE: 180 MMHG | RESPIRATION RATE: 18 BRPM | DIASTOLIC BLOOD PRESSURE: 77 MMHG | WEIGHT: 216.93 LBS | OXYGEN SATURATION: 99 % | HEART RATE: 81 BPM | HEIGHT: 67 IN

## 2024-07-08 VITALS — HEIGHT: 67 IN | WEIGHT: 217 LBS | BODY MASS INDEX: 34.06 KG/M2

## 2024-07-08 DIAGNOSIS — K80.00 CALCULUS OF GALLBLADDER WITH ACUTE CHOLECYSTITIS WITHOUT OBSTRUCTION: ICD-10-CM

## 2024-07-08 DIAGNOSIS — K80.50 CALCULUS OF BILE DUCT W/OUT CHOLANGITIS OR CHOLECYSTITIS W/OUT OBSTRUCTION: ICD-10-CM

## 2024-07-08 DIAGNOSIS — Z01.818 ENCOUNTER FOR OTHER PREPROCEDURAL EXAMINATION: ICD-10-CM

## 2024-07-08 DIAGNOSIS — K80.70 CALCULUS OF GALLBLADDER AND BILE DUCT WITHOUT CHOLECYSTITIS WITHOUT OBSTRUCTION: ICD-10-CM

## 2024-07-08 LAB
ALBUMIN SERPL ELPH-MCNC: 3.4 G/DL — SIGNIFICANT CHANGE UP (ref 3.3–5)
ALP SERPL-CCNC: 532 U/L — HIGH (ref 40–120)
ALT FLD-CCNC: 80 U/L — HIGH (ref 12–78)
ANION GAP SERPL CALC-SCNC: 10 MMOL/L — SIGNIFICANT CHANGE UP (ref 5–17)
APTT BLD: 38 SEC — HIGH (ref 24.5–35.6)
AST SERPL-CCNC: 59 U/L — HIGH (ref 15–37)
BASOPHILS # BLD AUTO: 0.05 K/UL — SIGNIFICANT CHANGE UP (ref 0–0.2)
BASOPHILS NFR BLD AUTO: 0.8 % — SIGNIFICANT CHANGE UP (ref 0–2)
BILIRUB SERPL-MCNC: 2.5 MG/DL — HIGH (ref 0.2–1.2)
BLD GP AB SCN SERPL QL: SIGNIFICANT CHANGE UP
BUN SERPL-MCNC: 13 MG/DL — SIGNIFICANT CHANGE UP (ref 7–23)
CALCIUM SERPL-MCNC: 9.8 MG/DL — SIGNIFICANT CHANGE UP (ref 8.5–10.1)
CHLORIDE SERPL-SCNC: 97 MMOL/L — SIGNIFICANT CHANGE UP (ref 96–108)
CO2 SERPL-SCNC: 24 MMOL/L — SIGNIFICANT CHANGE UP (ref 22–31)
CREAT SERPL-MCNC: 1.45 MG/DL — HIGH (ref 0.5–1.3)
EGFR: 52 ML/MIN/1.73M2 — LOW
EOSINOPHIL # BLD AUTO: 0.09 K/UL — SIGNIFICANT CHANGE UP (ref 0–0.5)
EOSINOPHIL NFR BLD AUTO: 1.4 % — SIGNIFICANT CHANGE UP (ref 0–6)
GLUCOSE BLDC GLUCOMTR-MCNC: 144 MG/DL — HIGH (ref 70–99)
GLUCOSE BLDC GLUCOMTR-MCNC: 153 MG/DL — HIGH (ref 70–99)
GLUCOSE SERPL-MCNC: 199 MG/DL — HIGH (ref 70–99)
HCT VFR BLD CALC: 38.9 % — LOW (ref 39–50)
HGB BLD-MCNC: 13.9 G/DL — SIGNIFICANT CHANGE UP (ref 13–17)
IMM GRANULOCYTES NFR BLD AUTO: 0.5 % — SIGNIFICANT CHANGE UP (ref 0–0.9)
INR BLD: 1.12 RATIO — SIGNIFICANT CHANGE UP (ref 0.85–1.18)
LACTATE SERPL-SCNC: 0.9 MMOL/L — SIGNIFICANT CHANGE UP (ref 0.7–2)
LACTATE SERPL-SCNC: 3.2 MMOL/L — HIGH (ref 0.7–2)
LIDOCAIN IGE QN: 83 U/L — HIGH (ref 13–75)
LYMPHOCYTES # BLD AUTO: 1.52 K/UL — SIGNIFICANT CHANGE UP (ref 1–3.3)
LYMPHOCYTES # BLD AUTO: 22.9 % — SIGNIFICANT CHANGE UP (ref 13–44)
MCHC RBC-ENTMCNC: 30.2 PG — SIGNIFICANT CHANGE UP (ref 27–34)
MCHC RBC-ENTMCNC: 35.7 G/DL — SIGNIFICANT CHANGE UP (ref 32–36)
MCV RBC AUTO: 84.4 FL — SIGNIFICANT CHANGE UP (ref 80–100)
MONOCYTES # BLD AUTO: 0.56 K/UL — SIGNIFICANT CHANGE UP (ref 0–0.9)
MONOCYTES NFR BLD AUTO: 8.4 % — SIGNIFICANT CHANGE UP (ref 2–14)
NEUTROPHILS # BLD AUTO: 4.39 K/UL — SIGNIFICANT CHANGE UP (ref 1.8–7.4)
NEUTROPHILS NFR BLD AUTO: 66 % — SIGNIFICANT CHANGE UP (ref 43–77)
NRBC # BLD: 0 /100 WBCS — SIGNIFICANT CHANGE UP (ref 0–0)
PLATELET # BLD AUTO: 233 K/UL — SIGNIFICANT CHANGE UP (ref 150–400)
POTASSIUM SERPL-MCNC: 3.7 MMOL/L — SIGNIFICANT CHANGE UP (ref 3.5–5.3)
POTASSIUM SERPL-SCNC: 3.7 MMOL/L — SIGNIFICANT CHANGE UP (ref 3.5–5.3)
PROT SERPL-MCNC: 7.7 GM/DL — SIGNIFICANT CHANGE UP (ref 6–8.3)
PROTHROM AB SERPL-ACNC: 13.3 SEC — HIGH (ref 9.5–13)
RBC # BLD: 4.61 M/UL — SIGNIFICANT CHANGE UP (ref 4.2–5.8)
RBC # FLD: 12.2 % — SIGNIFICANT CHANGE UP (ref 10.3–14.5)
SODIUM SERPL-SCNC: 131 MMOL/L — LOW (ref 135–145)
WBC # BLD: 6.64 K/UL — SIGNIFICANT CHANGE UP (ref 3.8–10.5)
WBC # FLD AUTO: 6.64 K/UL — SIGNIFICANT CHANGE UP (ref 3.8–10.5)

## 2024-07-08 PROCEDURE — 76700 US EXAM ABDOM COMPLETE: CPT | Mod: 26

## 2024-07-08 PROCEDURE — 99222 1ST HOSP IP/OBS MODERATE 55: CPT | Mod: FS,57

## 2024-07-08 PROCEDURE — G2211 COMPLEX E/M VISIT ADD ON: CPT

## 2024-07-08 PROCEDURE — 99285 EMERGENCY DEPT VISIT HI MDM: CPT

## 2024-07-08 PROCEDURE — 99214 OFFICE O/P EST MOD 30 MIN: CPT

## 2024-07-08 PROCEDURE — 36415 COLL VENOUS BLD VENIPUNCTURE: CPT

## 2024-07-08 PROCEDURE — 93000 ELECTROCARDIOGRAM COMPLETE: CPT

## 2024-07-08 PROCEDURE — 71045 X-RAY EXAM CHEST 1 VIEW: CPT | Mod: 26

## 2024-07-08 RX ORDER — DEXTROSE MONOHYDRATE AND SODIUM CHLORIDE 5; .3 G/100ML; G/100ML
1000 INJECTION, SOLUTION INTRAVENOUS
Refills: 0 | Status: DISCONTINUED | OUTPATIENT
Start: 2024-07-08 | End: 2024-07-10

## 2024-07-08 RX ORDER — DEXTROSE MONOHYDRATE AND SODIUM CHLORIDE 5; .3 G/100ML; G/100ML
1000 INJECTION, SOLUTION INTRAVENOUS ONCE
Refills: 0 | Status: COMPLETED | OUTPATIENT
Start: 2024-07-08 | End: 2024-07-08

## 2024-07-08 RX ORDER — CIPROFLOXACIN HCL 500 MG
400 TABLET ORAL EVERY 12 HOURS
Refills: 0 | Status: DISCONTINUED | OUTPATIENT
Start: 2024-07-09 | End: 2024-07-10

## 2024-07-08 RX ORDER — ONDANSETRON HYDROCHLORIDE 2 MG/ML
4 INJECTION INTRAMUSCULAR; INTRAVENOUS EVERY 6 HOURS
Refills: 0 | Status: DISCONTINUED | OUTPATIENT
Start: 2024-07-08 | End: 2024-07-11

## 2024-07-08 RX ORDER — DEXTROSE 30 % IN WATER 30 %
25 VIAL (ML) INTRAVENOUS ONCE
Refills: 0 | Status: DISCONTINUED | OUTPATIENT
Start: 2024-07-08 | End: 2024-07-10

## 2024-07-08 RX ORDER — DEXTROSE MONOHYDRATE 100 MG/ML
125 INJECTION, SOLUTION INTRAVENOUS ONCE
Refills: 0 | Status: DISCONTINUED | OUTPATIENT
Start: 2024-07-08 | End: 2024-07-10

## 2024-07-08 RX ORDER — GLUCAGON HYDROCHLORIDE 1 MG/ML
1 INJECTION, POWDER, FOR SOLUTION INTRAMUSCULAR; INTRAVENOUS; SUBCUTANEOUS ONCE
Refills: 0 | Status: DISCONTINUED | OUTPATIENT
Start: 2024-07-08 | End: 2024-07-10

## 2024-07-08 RX ORDER — INSULIN LISPRO 100 [IU]/ML
INJECTION, SOLUTION SUBCUTANEOUS EVERY 6 HOURS
Refills: 0 | Status: DISCONTINUED | OUTPATIENT
Start: 2024-07-08 | End: 2024-07-10

## 2024-07-08 RX ORDER — CIPROFLOXACIN HCL 500 MG
TABLET ORAL
Refills: 0 | Status: DISCONTINUED | OUTPATIENT
Start: 2024-07-08 | End: 2024-07-10

## 2024-07-08 RX ORDER — DEXTROSE 30 % IN WATER 30 %
12.5 VIAL (ML) INTRAVENOUS ONCE
Refills: 0 | Status: DISCONTINUED | OUTPATIENT
Start: 2024-07-08 | End: 2024-07-10

## 2024-07-08 RX ORDER — CIPROFLOXACIN HCL 500 MG
400 TABLET ORAL ONCE
Refills: 0 | Status: COMPLETED | OUTPATIENT
Start: 2024-07-08 | End: 2024-07-08

## 2024-07-08 RX ORDER — ACETAMINOPHEN 325 MG
1000 TABLET ORAL ONCE
Refills: 0 | Status: DISCONTINUED | OUTPATIENT
Start: 2024-07-08 | End: 2024-07-08

## 2024-07-08 RX ORDER — MORPHINE SULFATE 100 MG/1
2 TABLET, EXTENDED RELEASE ORAL EVERY 6 HOURS
Refills: 0 | Status: DISCONTINUED | OUTPATIENT
Start: 2024-07-08 | End: 2024-07-10

## 2024-07-08 RX ORDER — SODIUM CHLORIDE 0.9 % (FLUSH) 0.9 %
1000 SYRINGE (ML) INJECTION ONCE
Refills: 0 | Status: COMPLETED | OUTPATIENT
Start: 2024-07-08 | End: 2024-07-08

## 2024-07-08 RX ORDER — METRONIDAZOLE 500 MG/1
500 TABLET ORAL EVERY 12 HOURS
Refills: 0 | Status: DISCONTINUED | OUTPATIENT
Start: 2024-07-08 | End: 2024-07-10

## 2024-07-08 RX ORDER — DEXTROSE 30 % IN WATER 30 %
15 VIAL (ML) INTRAVENOUS ONCE
Refills: 0 | Status: DISCONTINUED | OUTPATIENT
Start: 2024-07-08 | End: 2024-07-10

## 2024-07-08 RX ADMIN — DEXTROSE MONOHYDRATE AND SODIUM CHLORIDE 1000 MILLILITER(S): 5; .3 INJECTION, SOLUTION INTRAVENOUS at 20:42

## 2024-07-08 RX ADMIN — Medication 400 MILLIGRAM(S): at 18:04

## 2024-07-08 RX ADMIN — METRONIDAZOLE 100 MILLIGRAM(S): 500 TABLET ORAL at 22:21

## 2024-07-08 RX ADMIN — Medication 1000 MILLILITER(S): at 18:03

## 2024-07-08 NOTE — CONSULT NOTE ADULT - ASSESSMENT
69yo M with PMH of CVA on ASA with right sided residual weakness, HTN, DM, who presents to ED c/o worsening ruq abdominal pain. and elevated LFTS suspicious for Choledocholithiasis

## 2024-07-08 NOTE — H&P ADULT - ASSESSMENT
69yo M with PMH of CVA on ASA with right sided residual weakness, HTN, DM, who presents to ED c/o worsening abdominal pain x5 days. Patient has a known history of cholelithiasis and choledocholithiasis and was scheduled to have an elective cholecystectomy on 7/10, but he was unable to handle the pain. Patient; presentation now concerning for acute cholecystitis.  - admit to surgery  - IV abx   - NPO, IVFs  - analgesics prn  - plan for ERCP with Dr. Cisneros on +7/9, and cholecystectomy on 7/10 with Dr. Vidales  - holding ASA  - f/u labs, pre op EKG, CXR  - medicine consult for comanagement/optimization   - d/w Dr. Vidales

## 2024-07-08 NOTE — CONSULT NOTE ADULT - PROBLEM SELECTOR RECOMMENDATION 2
-  Risks, benefits and alternatives including but not limited to pancreatitis were discussed at length with patient regarding endoscopic retrograde cholangio pancreatography   and informed consent obtained. All questions were answered.   _NPO after midnight for ERCP   - IV hydration and hold ASA

## 2024-07-08 NOTE — ED PROVIDER NOTE - OBJECTIVE STATEMENT
68 year old male with PMH of DM II , HTN, CVA hx otherwise presenting to ED due to abd pain - RUQ on and off for past 6 months - now worsened 68 year old male with PMH of DM II , HTN, CVA hx otherwise presenting to ED due to abd pain - RUQ on and off for past 6 months - now worsened without fever/chills however. Pain is about a 5/10 and otherwise sent in for ERCP and cholecystectomy with known GB stone and GB disease.

## 2024-07-08 NOTE — H&P ADULT - NSHPPHYSICALEXAM_GEN_ALL_CORE
General: Appears stated age, No acute distress, WD/WN  Head: NC/AT  Eyes: EOMI. Conjunctiva and sclera clear.   Neck: Supple.  Lungs: CTA B/l. Nonlabored Respirations  CV: +S1S2, RRR  Abdomen: soft, ttp at RUQ, non distended  Extremities: Warm and well perfused. 2+ peripheral pulses b/l. Calf soft, nontender b/l. No pedal edema.  Neuro: A&Ox3.

## 2024-07-08 NOTE — ED ADULT TRIAGE NOTE - CHIEF COMPLAINT QUOTE
Patient reports "I have pain in my Gallbladder. I am scheduled for surgery on Wednesday. Saw my regular doctor for medical clearance. I have sweat, chills, tired, worsening pain, slight nausea. Doctor told me to come to hospital."  PMH: HTN, DM, CVA 5 years ago

## 2024-07-08 NOTE — CONSULT NOTE ADULT - SUBJECTIVE AND OBJECTIVE BOX
Chief Complaint:  Patient is a 68y old  Male who presents with a chief complaint of acute angie/choledocho (08 Jul 2024 17:16)      HPI:  67yo M with PMH of CVA on ASA with right sided residual weakness, HTN, DM, who presents to ED c/o worsening abdominal pain. Patient states he's had abdominal pain for the past few months in the RUQ/epigastric region, which worsened x5 days ago. Patient has a known history of cholelithiasis and choledocholithiasis and was scheduled to have an elective cholecystectomy on 7/10, but he was unable to handle the pain, prompting him to come to ED. Patient admits to chills and sweats but denies any N/V/D, CP, SOB.  (08 Jul 2024 17:16)  We were asked to evaluate patient for above with elevated LFTS and + CBD stone   intermittent dark urine       PMH/PSH:PAST MEDICAL & SURGICAL HISTORY:  HTN (hypertension)  DM (diabetes mellitus)  CVA (cerebrovascular accident)  No significant past surgical history    Allergies:  penicillin (Swelling)    Medications:  ciprofloxacin   IVPB      dextrose 10% Bolus 125 milliLiter(s) IV Bolus once  dextrose 5%. 1000 milliLiter(s) IV Continuous <Continuous>  dextrose 5%. 1000 milliLiter(s) IV Continuous <Continuous>  dextrose 50% Injectable 12.5 Gram(s) IV Push once  dextrose 50% Injectable 25 Gram(s) IV Push once  dextrose Oral Gel 15 Gram(s) Oral once PRN  glucagon  Injectable 1 milliGRAM(s) IntraMuscular once  insulin lispro (ADMELOG) corrective regimen sliding scale   SubCutaneous every 6 hours  lactated ringers Bolus 1000 milliLiter(s) IV Bolus once  lactated ringers. 1000 milliLiter(s) IV Continuous <Continuous>  metroNIDAZOLE  IVPB 500 milliGRAM(s) IV Intermittent every 12 hours      Review of Systems:    General:  No weight loss, fevers, chills, night sweats, fatigue,   Eyes:  Good vision, no reported pain  ENT:  No sore throat, pain, runny nose, dysphagia  CV:  No pain, palpitations, hypo/hypertension  Resp:  No dyspnea, cough, tachypnea, wheezing  GI:  +pain, No nausea, No vomiting, No diarrhea, No constipation, No pruritis, No rectal bleeding, No tarry stools, No dysphagia,  :  No pain, bleeding, incontinence, nocturia  Muscle:  No pain, weakness  Breast:  No pain, abscess, mass, discharge  Neuro:  No weakness, tingling, memory problems  Psych:  No fatigue, insomnia, mood problems, depression  Endocrine:  No polyuria, polydipsia, cold/heat intolerance  Heme:  No petechiae, ecchymosis, easy bruisability  Skin:  No rash, tattoos, scars, edema    Relevant Family History:   FAMILY HISTORY:  No pertinent family history in first degree relatives        Relevant Social History: Alcohol ( -) , Tobacco ( -) , Illicit drugs (- )     Physical Exam:    Vital Signs:  Vital Signs Last 24 Hrs  T(C): 36.7 (08 Jul 2024 18:15), Max: 36.8 (08 Jul 2024 15:55)  T(F): 98.1 (08 Jul 2024 18:15), Max: 98.2 (08 Jul 2024 15:55)  HR: 81 (08 Jul 2024 15:55) (81 - 81)  BP: 154/61 (08 Jul 2024 18:15) (154/61 - 180/77)  BP(mean): --  RR: 14 (08 Jul 2024 18:15) (14 - 18)  SpO2: 98% (08 Jul 2024 18:15) (98% - 99%)    Parameters below as of 08 Jul 2024 18:15  Patient On (Oxygen Delivery Method): room air      Daily Height in cm: 170.18 (08 Jul 2024 15:55)    Daily     General:  Appears stated age, well-groomed, well-nourished, no distress  HEENT:  NC/AT,  conjunctivae clear and pink, no thyromegaly, nodules, adenopathy, no JVD, anicteric sclera  Chest:  Full & symmetric excursion, no increased effort, breath sounds clear  Cardiovascular:  Regular rhythm, S1, S2, no murmur/rub/S3/S4, no abdominal bruit, no edema  Abdomen:  Soft, +tender, non distended, normoactive bowel sounds,  no masses , no hepatosplenomegaly, no signs of chronic liver disease  Extremities:  no cyanosis, clubbing or edema  Skin:  No rash/erythema/ecchymoses/petechiae/wounds/abscess/warm/dry  Neuro/Psych:  Alert, oriented, no asterixis, no tremor, no encephalopathy    Laboratory:                          13.9   6.64  )-----------( 233      ( 08 Jul 2024 17:49 )             38.9     07-08    131<L>  |  97  |  13  ----------------------------<  199<H>  3.7   |  24  |  1.45<H>    Ca    9.8      08 Jul 2024 17:49    TPro  7.7  /  Alb  3.4  /  TBili  2.5<H>  /  DBili  x   /  AST  59<H>  /  ALT  80<H>  /  AlkPhos  532<H>  07-08    LIVER FUNCTIONS - ( 08 Jul 2024 17:49 )  Alb: 3.4 g/dL / Pro: 7.7 gm/dL / ALK PHOS: 532 U/L / ALT: 80 U/L / AST: 59 U/L / GGT: x           PT/INR - ( 08 Jul 2024 17:49 )   PT: 13.3 sec;   INR: 1.12 ratio         PTT - ( 08 Jul 2024 17:49 )  PTT:38.0 sec  Urinalysis Basic - ( 08 Jul 2024 17:49 )    Color: x / Appearance: x / SG: x / pH: x  Gluc: 199 mg/dL / Ketone: x  / Bili: x / Urobili: x   Blood: x / Protein: x / Nitrite: x   Leuk Esterase: x / RBC: x / WBC x   Sq Epi: x / Non Sq Epi: x / Bacteria: x          Lipase serum83 U/L<H>

## 2024-07-08 NOTE — H&P ADULT - HISTORY OF PRESENT ILLNESS
67yo M with PMH of CVA on ASA with right sided residual weakness, HTN, DM, who presents to ED c/o worsening abdominal pain 69yo M with PMH of CVA on ASA with right sided residual weakness, HTN, DM, who presents to ED c/o worsening abdominal pain. Patient states he's had abdominal pain for the past few months in the RUQ/epigastric region, which worsened x5 days ago. Patient has a known history of cholelithiasis and choledocholithiasis and was scheduled to have an elective cholecystectomy on 7/10, but he was unable to handle the pain, prompting him to come to ED. Patient admits to chills and sweats but denies any N/V/D, CP, SOB.

## 2024-07-08 NOTE — ED PROVIDER NOTE - CLINICAL SUMMARY MEDICAL DECISION MAKING FREE TEXT BOX
pt with suspected GB stone in CBD - otherwise to admit for ERCP with  surgical team - Dr Vidales  and will have surgical resection of gallbladder in 2 days.

## 2024-07-08 NOTE — ED ADULT NURSE NOTE - NSFALLUNIVINTERV_ED_ALL_ED
Bed/Stretcher in lowest position, wheels locked, appropriate side rails in place/Call bell, personal items and telephone in reach/Instruct patient to call for assistance before getting out of bed/chair/stretcher/Non-slip footwear applied when patient is off stretcher/[place] to call system/Physically safe environment - no spills, clutter or unnecessary equipment/Purposeful proactive rounding/Room/bathroom lighting operational, light cord in reach

## 2024-07-08 NOTE — ED ADULT NURSE NOTE - OBJECTIVE STATEMENT
68 yr old male with PMH of hypertension, diabetes, right sided stroke. Pt comes in with abdominal pain starting 6 months ago, for a planned surgery. Pt denies chest pain, SOB, dizziness, blurred vision, N/V/D.

## 2024-07-09 ENCOUNTER — TRANSCRIPTION ENCOUNTER (OUTPATIENT)
Age: 69
End: 2024-07-09

## 2024-07-09 LAB
A1C WITH ESTIMATED AVERAGE GLUCOSE RESULT: 7.8 % — HIGH (ref 4–5.6)
ALBUMIN SERPL ELPH-MCNC: 3 G/DL — LOW (ref 3.3–5)
ALBUMIN SERPL ELPH-MCNC: 4.3 G/DL
ALP BLD-CCNC: 538 U/L
ALP SERPL-CCNC: 451 U/L — HIGH (ref 40–120)
ALT FLD-CCNC: 64 U/L — SIGNIFICANT CHANGE UP (ref 12–78)
ALT SERPL-CCNC: 68 U/L
ANION GAP SERPL CALC-SCNC: 17 MMOL/L
ANION GAP SERPL CALC-SCNC: 9 MMOL/L — SIGNIFICANT CHANGE UP (ref 5–17)
APPEARANCE: CLEAR
AST SERPL-CCNC: 39 U/L — HIGH (ref 15–37)
BASOPHILS # BLD AUTO: 0.05 K/UL
BASOPHILS NFR BLD AUTO: 0.8 %
BILIRUB DIRECT SERPL-MCNC: 0.8 MG/DL — HIGH (ref 0–0.3)
BILIRUB INDIRECT FLD-MCNC: 1.4 MG/DL — HIGH (ref 0.2–1)
BILIRUB SERPL-MCNC: 2.2 MG/DL — HIGH (ref 0.2–1.2)
BILIRUBIN URINE: NEGATIVE
BLOOD URINE: NEGATIVE
BUN SERPL-MCNC: 10 MG/DL — SIGNIFICANT CHANGE UP (ref 7–23)
BUN SERPL-MCNC: 12 MG/DL
CALCIUM SERPL-MCNC: 9.6 MG/DL
CALCIUM SERPL-MCNC: 9.6 MG/DL — SIGNIFICANT CHANGE UP (ref 8.5–10.1)
CHLORIDE SERPL-SCNC: 102 MMOL/L — SIGNIFICANT CHANGE UP (ref 96–108)
CHLORIDE SERPL-SCNC: 93 MMOL/L
CO2 SERPL-SCNC: 21 MMOL/L
CO2 SERPL-SCNC: 23 MMOL/L — SIGNIFICANT CHANGE UP (ref 22–31)
COLOR: NORMAL
CREAT SERPL-MCNC: 1.14 MG/DL — SIGNIFICANT CHANGE UP (ref 0.5–1.3)
CREAT SERPL-MCNC: 1.22 MG/DL
EGFR: 64 ML/MIN/1.73M2
EGFR: 70 ML/MIN/1.73M2 — SIGNIFICANT CHANGE UP
EOSINOPHIL # BLD AUTO: 0.08 K/UL
EOSINOPHIL NFR BLD AUTO: 1.2 %
ESTIMATED AVERAGE GLUCOSE: 177 MG/DL — HIGH (ref 68–114)
ESTIMATED AVERAGE GLUCOSE: 180 MG/DL
GLUCOSE BLDC GLUCOMTR-MCNC: 142 MG/DL — HIGH (ref 70–99)
GLUCOSE BLDC GLUCOMTR-MCNC: 162 MG/DL — HIGH (ref 70–99)
GLUCOSE BLDC GLUCOMTR-MCNC: 170 MG/DL — HIGH (ref 70–99)
GLUCOSE BLDC GLUCOMTR-MCNC: 240 MG/DL — HIGH (ref 70–99)
GLUCOSE BLDC GLUCOMTR-MCNC: 251 MG/DL — HIGH (ref 70–99)
GLUCOSE QUALITATIVE U: 100 MG/DL
GLUCOSE SERPL-MCNC: 177 MG/DL — HIGH (ref 70–99)
GLUCOSE SERPL-MCNC: 198 MG/DL
HBA1C MFR BLD HPLC: 7.9 %
HCT VFR BLD CALC: 36.9 % — LOW (ref 39–50)
HCT VFR BLD CALC: 41.5 %
HGB BLD-MCNC: 13.2 G/DL — SIGNIFICANT CHANGE UP (ref 13–17)
HGB BLD-MCNC: 13.9 G/DL
IMM GRANULOCYTES NFR BLD AUTO: 0.6 %
KETONES URINE: NEGATIVE MG/DL
LEUKOCYTE ESTERASE URINE: NEGATIVE
LIDOCAIN IGE QN: 74 U/L — SIGNIFICANT CHANGE UP (ref 13–75)
LYMPHOCYTES # BLD AUTO: 1.5 K/UL
LYMPHOCYTES NFR BLD AUTO: 22.9 %
MAGNESIUM SERPL-MCNC: 1.8 MG/DL — SIGNIFICANT CHANGE UP (ref 1.6–2.6)
MAN DIFF?: NORMAL
MCHC RBC-ENTMCNC: 30 PG
MCHC RBC-ENTMCNC: 30.1 PG — SIGNIFICANT CHANGE UP (ref 27–34)
MCHC RBC-ENTMCNC: 33.5 GM/DL
MCHC RBC-ENTMCNC: 35.8 G/DL — SIGNIFICANT CHANGE UP (ref 32–36)
MCV RBC AUTO: 84.1 FL — SIGNIFICANT CHANGE UP (ref 80–100)
MONOCYTES # BLD AUTO: 0.55 K/UL
MONOCYTES NFR BLD AUTO: 8.4 %
NEUTROPHILS # BLD AUTO: 4.32 K/UL
NEUTROPHILS NFR BLD AUTO: 66.1 %
NITRITE URINE: NEGATIVE
NRBC # BLD: 0 /100 WBCS — SIGNIFICANT CHANGE UP (ref 0–0)
PH URINE: 6
PHOSPHATE SERPL-MCNC: 2.7 MG/DL — SIGNIFICANT CHANGE UP (ref 2.5–4.5)
PLATELET # BLD AUTO: 213 K/UL — SIGNIFICANT CHANGE UP (ref 150–400)
PLATELET # BLD AUTO: 242 K/UL
POTASSIUM SERPL-MCNC: 3.6 MMOL/L — SIGNIFICANT CHANGE UP (ref 3.5–5.3)
POTASSIUM SERPL-SCNC: 3.6 MMOL/L — SIGNIFICANT CHANGE UP (ref 3.5–5.3)
POTASSIUM SERPL-SCNC: 4.1 MMOL/L
PROT SERPL-MCNC: 6.9 GM/DL — SIGNIFICANT CHANGE UP (ref 6–8.3)
PROT SERPL-MCNC: 7.3 G/DL
PROTEIN URINE: 30 MG/DL
RBC # BLD: 4.39 M/UL — SIGNIFICANT CHANGE UP (ref 4.2–5.8)
RBC # BLD: 4.63 M/UL
RBC # FLD: 12.2 % — SIGNIFICANT CHANGE UP (ref 10.3–14.5)
RBC # FLD: 12.9 %
SODIUM SERPL-SCNC: 131 MMOL/L
SODIUM SERPL-SCNC: 134 MMOL/L — LOW (ref 135–145)
SPECIFIC GRAVITY URINE: 1.01
UROBILINOGEN URINE: 1 MG/DL
WBC # BLD: 4.99 K/UL — SIGNIFICANT CHANGE UP (ref 3.8–10.5)
WBC # FLD AUTO: 4.99 K/UL — SIGNIFICANT CHANGE UP (ref 3.8–10.5)
WBC # FLD AUTO: 6.54 K/UL

## 2024-07-09 PROCEDURE — 99223 1ST HOSP IP/OBS HIGH 75: CPT

## 2024-07-09 PROCEDURE — 99231 SBSQ HOSP IP/OBS SF/LOW 25: CPT | Mod: FS

## 2024-07-09 PROCEDURE — 99233 SBSQ HOSP IP/OBS HIGH 50: CPT

## 2024-07-09 DEVICE — HYDRATOME 44: Type: IMPLANTABLE DEVICE | Status: FUNCTIONAL

## 2024-07-09 DEVICE — IMPLANTABLE DEVICE: Type: IMPLANTABLE DEVICE | Status: FUNCTIONAL

## 2024-07-09 DEVICE — CATH BLLN BIL RX 9-12CM: Type: IMPLANTABLE DEVICE | Status: FUNCTIONAL

## 2024-07-09 DEVICE — STENT BIL PUSH CATH 7FRX170CM: Type: IMPLANTABLE DEVICE | Status: FUNCTIONAL

## 2024-07-09 RX ORDER — ROSUVASTATIN CALCIUM 20 MG/1
1 TABLET ORAL
Refills: 0 | DISCHARGE

## 2024-07-09 RX ORDER — HYDRALAZINE HYDROCHLORIDE 50 MG/1
25 TABLET ORAL EVERY 8 HOURS
Refills: 0 | Status: DISCONTINUED | OUTPATIENT
Start: 2024-07-09 | End: 2024-07-09

## 2024-07-09 RX ORDER — MAGNESIUM OXIDE 400 MG/1
400 TABLET ORAL ONCE
Refills: 0 | Status: COMPLETED | OUTPATIENT
Start: 2024-07-09 | End: 2024-07-09

## 2024-07-09 RX ORDER — NEBIVOLOL 5 MG/1
5 TABLET ORAL DAILY
Refills: 0 | Status: DISCONTINUED | OUTPATIENT
Start: 2024-07-09 | End: 2024-07-11

## 2024-07-09 RX ORDER — ONDANSETRON HYDROCHLORIDE 2 MG/ML
4 INJECTION INTRAMUSCULAR; INTRAVENOUS ONCE
Refills: 0 | Status: DISCONTINUED | OUTPATIENT
Start: 2024-07-09 | End: 2024-07-09

## 2024-07-09 RX ORDER — NEBIVOLOL 5 MG/1
1 TABLET ORAL
Refills: 0 | DISCHARGE

## 2024-07-09 RX ORDER — DEXTROSE MONOHYDRATE AND SODIUM CHLORIDE 5; .3 G/100ML; G/100ML
1000 INJECTION, SOLUTION INTRAVENOUS
Refills: 0 | Status: DISCONTINUED | OUTPATIENT
Start: 2024-07-09 | End: 2024-07-09

## 2024-07-09 RX ORDER — OMEPRAZOLE 10 MG/1
20 CAPSULE, DELAYED RELEASE ORAL
Refills: 0 | DISCHARGE

## 2024-07-09 RX ORDER — POTASSIUM CHLORIDE 600 MG/1
40 TABLET, FILM COATED, EXTENDED RELEASE ORAL ONCE
Refills: 0 | Status: COMPLETED | OUTPATIENT
Start: 2024-07-09 | End: 2024-07-09

## 2024-07-09 RX ORDER — FENTANYL CITRATE 50 UG/ML
50 INJECTION, SOLUTION INTRAMUSCULAR; INTRAVENOUS
Refills: 0 | Status: DISCONTINUED | OUTPATIENT
Start: 2024-07-09 | End: 2024-07-09

## 2024-07-09 RX ORDER — LOSARTAN POTASSIUM 100 MG/1
1 TABLET, FILM COATED ORAL
Refills: 0 | DISCHARGE

## 2024-07-09 RX ORDER — METFORMIN HYDROCHLORIDE 850 MG/1
1 TABLET, FILM COATED ORAL
Refills: 0 | DISCHARGE

## 2024-07-09 RX ADMIN — INSULIN LISPRO 2: 100 INJECTION, SOLUTION SUBCUTANEOUS at 12:20

## 2024-07-09 RX ADMIN — Medication 200 MILLIGRAM(S): at 05:24

## 2024-07-09 RX ADMIN — DEXTROSE MONOHYDRATE AND SODIUM CHLORIDE 125 MILLILITER(S): 5; .3 INJECTION, SOLUTION INTRAVENOUS at 16:36

## 2024-07-09 RX ADMIN — Medication 200 MILLIGRAM(S): at 17:56

## 2024-07-09 RX ADMIN — ONDANSETRON HYDROCHLORIDE 4 MILLIGRAM(S): 2 INJECTION INTRAMUSCULAR; INTRAVENOUS at 11:33

## 2024-07-09 RX ADMIN — INSULIN LISPRO 6: 100 INJECTION, SOLUTION SUBCUTANEOUS at 23:54

## 2024-07-09 RX ADMIN — MAGNESIUM OXIDE 400 MILLIGRAM(S): 400 TABLET ORAL at 21:41

## 2024-07-09 RX ADMIN — DEXTROSE MONOHYDRATE AND SODIUM CHLORIDE 140 MILLILITER(S): 5; .3 INJECTION, SOLUTION INTRAVENOUS at 09:57

## 2024-07-09 RX ADMIN — METRONIDAZOLE 100 MILLIGRAM(S): 500 TABLET ORAL at 05:36

## 2024-07-09 RX ADMIN — METRONIDAZOLE 100 MILLIGRAM(S): 500 TABLET ORAL at 17:55

## 2024-07-09 RX ADMIN — INSULIN LISPRO 4: 100 INJECTION, SOLUTION SUBCUTANEOUS at 18:51

## 2024-07-09 RX ADMIN — POTASSIUM CHLORIDE 40 MILLIEQUIVALENT(S): 600 TABLET, FILM COATED, EXTENDED RELEASE ORAL at 21:41

## 2024-07-09 NOTE — CONSULT NOTE ADULT - SUBJECTIVE AND OBJECTIVE BOX
HPI:  67yo M with PMH of CVA on ASA with right sided residual weakness, HTN, DM, who presents to ED c/o worsening abdominal pain. Patient states he's had abdominal pain for the past few month w/ history of cholelithiasis and choledocholithiasis scheduled to have an elective cholecystectomy on 7/10, came to the ED w/ worsening abd pain and was admitted w/ acute cholecystitis. Medicine was called for medical comanagement.        PAST MEDICAL & SURGICAL HISTORY:  HTN (hypertension)  DM (diabetes mellitus)  CVA (cerebrovascular accident)      No significant past surgical history          REVIEW OF SYSTEMS:    CONSTITUTIONAL: No fever, weight loss, or fatigue  EYES: No eye pain, visual disturbances, or discharge  ENMT:  No difficulty hearing, tinnitus, vertigo; No sinus or throat pain  NECK: No pain or stiffness  BREASTS: No pain, masses, or nipple discharge  RESPIRATORY: No cough, wheezing, chills or hemoptysis; No shortness of breath  CARDIOVASCULAR: No chest pain, palpitations, dizziness, or leg swelling  GASTROINTESTINAL: No abdominal or epigastric pain. No nausea, vomiting, or hematemesis; No diarrhea or constipation. No melena or hematochezia.  GENITOURINARY: No dysuria, frequency, hematuria, or incontinence  NEUROLOGICAL: No headaches, memory loss, loss of strength, numbness, or tremors  SKIN: No itching, burning, rashes, or lesions   LYMPH NODES: No enlarged glands  ENDOCRINE: No heat or cold intolerance; No hair loss  MUSCULOSKELETAL: No joint pain or swelling; No muscle, back, or extremity pain  PSYCHIATRIC: No depression, anxiety, mood swings, or difficulty sleeping  HEME/LYMPH: No easy bruising, or bleeding gums  ALLERGY AND IMMUNOLOGIC: No hives or eczema      MEDICATIONS  (STANDING):  ciprofloxacin   IVPB      ciprofloxacin   IVPB 400 milliGRAM(s) IV Intermittent every 12 hours  dextrose 10% Bolus 125 milliLiter(s) IV Bolus once  dextrose 5%. 1000 milliLiter(s) (100 mL/Hr) IV Continuous <Continuous>  dextrose 5%. 1000 milliLiter(s) (50 mL/Hr) IV Continuous <Continuous>  dextrose 50% Injectable 25 Gram(s) IV Push once  dextrose 50% Injectable 12.5 Gram(s) IV Push once  glucagon  Injectable 1 milliGRAM(s) IntraMuscular once  insulin lispro (ADMELOG) corrective regimen sliding scale   SubCutaneous every 6 hours  lactated ringers. 1000 milliLiter(s) (140 mL/Hr) IV Continuous <Continuous>  metroNIDAZOLE  IVPB 500 milliGRAM(s) IV Intermittent every 12 hours    MEDICATIONS  (PRN):  dextrose Oral Gel 15 Gram(s) Oral once PRN Blood Glucose LESS THAN 70 milliGRAM(s)/deciliter  morphine  - Injectable 2 milliGRAM(s) IV Push every 6 hours PRN Severe Pain (7 - 10)  ondansetron Injectable 4 milliGRAM(s) IV Push every 6 hours PRN Nausea and/or Vomiting      Allergies    penicillin (Swelling)    Intolerances        SOCIAL HISTORY:    FAMILY HISTORY:  No pertinent family history in first degree relatives        Vital Signs Last 24 Hrs  T(C): 36.7 (09 Jul 2024 11:34), Max: 36.8 (08 Jul 2024 15:55)  T(F): 98 (09 Jul 2024 11:34), Max: 98.3 (08 Jul 2024 20:16)  HR: 56 (09 Jul 2024 11:34) (56 - 81)  BP: 137/76 (09 Jul 2024 11:34) (137/76 - 180/77)  BP(mean): --  RR: 17 (09 Jul 2024 11:34) (14 - 18)  SpO2: 98% (09 Jul 2024 11:34) (96% - 99%)    Parameters below as of 09 Jul 2024 11:34  Patient On (Oxygen Delivery Method): room air        PHYSICAL EXAM:    GENERAL: NAD, well-groomed, well-developed  HEAD:  Atraumatic, Normocephalic  EYES: EOMI, PERRLA, conjunctiva and sclera clear  ENMT: No tonsillar erythema, exudates, or enlargement; Moist mucous membranes, Good dentition, No lesions  NECK: Supple, No JVD, Normal thyroid  NERVOUS SYSTEM:  Alert & Oriented X3, Good concentration; Motor Strength 5/5 B/L upper and lower extremities; DTRs 2+ intact and symmetric  CHEST/LUNG: Clear to percussion bilaterally; No rales, rhonchi, wheezing, or rubs  HEART: Regular rate and rhythm; No murmurs, rubs, or gallops  ABDOMEN: Soft, Nontender, Nondistended; Bowel sounds present  EXTREMITIES:  2+ Peripheral Pulses, No clubbing, cyanosis, or edema  LYMPH: No lymphadenopathy notedRECTAL: No masses, prostate normal size and smooth, Guiac negative   BREAST: No palpatble masses, skin no lesions no retractions, no discharages. adenexa no palpable masses noted   GYN: uterus normal size, adenexa no palpable masses, no CMT, no uterine discharge   SKIN: No rashes or lesions      LABS:                        13.2   4.99  )-----------( 213      ( 09 Jul 2024 06:00 )             36.9     07-09    134<L>  |  102  |  10  ----------------------------<  177<H>  3.6   |  23  |  1.14    Ca    9.6      09 Jul 2024 06:00  Phos  2.7     07-09  Mg     1.8     07-09    TPro  6.9  /  Alb  3.0<L>  /  TBili  2.2<H>  /  DBili  0.8<H>  /  AST  39<H>  /  ALT  64  /  AlkPhos  451<H>  07-09    PT/INR - ( 08 Jul 2024 17:49 )   PT: 13.3 sec;   INR: 1.12 ratio         PTT - ( 08 Jul 2024 17:49 )  PTT:38.0 sec  Urinalysis Basic - ( 09 Jul 2024 06:00 )    Color: x / Appearance: x / SG: x / pH: x  Gluc: 177 mg/dL / Ketone: x  / Bili: x / Urobili: x   Blood: x / Protein: x / Nitrite: x   Leuk Esterase: x / RBC: x / WBC x   Sq Epi: x / Non Sq Epi: x / Bacteria: x        RADIOLOGY & ADDITIONAL STUDIES: HPI:  67yo M with PMH of CVA on ASA with right sided residual weakness, HTN, DM, who presents to ED c/o worsening abdominal pain. Patient states he's had abdominal pain for the past few month w/ history of cholelithiasis and choledocholithiasis scheduled to have an elective cholecystectomy on 7/10, came to the ED w/ worsening abd pain and was admitted w/ acute cholecystitis. Medicine was called for medical comanagement.        PAST MEDICAL & SURGICAL HISTORY:  HTN (hypertension)  DM (diabetes mellitus)  CVA (cerebrovascular accident)      No significant past surgical history          REVIEW OF SYSTEMS:    CONSTITUTIONAL: No fever, weight loss, or fatigue  EYES: No eye pain, visual disturbances, or discharge  ENMT:  No difficulty hearing, tinnitus, vertigo; No sinus or throat pain  NECK: No pain or stiffness  BREASTS: No pain, masses, or nipple discharge  RESPIRATORY: No cough, wheezing, chills or hemoptysis; No shortness of breath  CARDIOVASCULAR: No chest pain, palpitations, dizziness, or leg swelling  GASTROINTESTINAL: No abdominal or epigastric pain. No nausea, vomiting, or hematemesis; No diarrhea or constipation. No melena or hematochezia.  GENITOURINARY: No dysuria, frequency, hematuria, or incontinence  NEUROLOGICAL: No headaches, memory loss, loss of strength, numbness, or tremors  SKIN: No itching, burning, rashes, or lesions   LYMPH NODES: No enlarged glands  ENDOCRINE: No heat or cold intolerance; No hair loss  MUSCULOSKELETAL: No joint pain or swelling; No muscle, back, or extremity pain  PSYCHIATRIC: No depression, anxiety, mood swings, or difficulty sleeping  HEME/LYMPH: No easy bruising, or bleeding gums  ALLERGY AND IMMUNOLOGIC: No hives or eczema      MEDICATIONS  (STANDING):  ciprofloxacin   IVPB      ciprofloxacin   IVPB 400 milliGRAM(s) IV Intermittent every 12 hours  dextrose 10% Bolus 125 milliLiter(s) IV Bolus once  dextrose 5%. 1000 milliLiter(s) (100 mL/Hr) IV Continuous <Continuous>  dextrose 5%. 1000 milliLiter(s) (50 mL/Hr) IV Continuous <Continuous>  dextrose 50% Injectable 25 Gram(s) IV Push once  dextrose 50% Injectable 12.5 Gram(s) IV Push once  glucagon  Injectable 1 milliGRAM(s) IntraMuscular once  insulin lispro (ADMELOG) corrective regimen sliding scale   SubCutaneous every 6 hours  lactated ringers. 1000 milliLiter(s) (140 mL/Hr) IV Continuous <Continuous>  metroNIDAZOLE  IVPB 500 milliGRAM(s) IV Intermittent every 12 hours    MEDICATIONS  (PRN):  dextrose Oral Gel 15 Gram(s) Oral once PRN Blood Glucose LESS THAN 70 milliGRAM(s)/deciliter  morphine  - Injectable 2 milliGRAM(s) IV Push every 6 hours PRN Severe Pain (7 - 10)  ondansetron Injectable 4 milliGRAM(s) IV Push every 6 hours PRN Nausea and/or Vomiting      Allergies    penicillin (Swelling)    Intolerances        SOCIAL HISTORY:    FAMILY HISTORY:  No pertinent family history in first degree relatives        Vital Signs Last 24 Hrs  T(C): 36.7 (09 Jul 2024 11:34), Max: 36.8 (08 Jul 2024 15:55)  T(F): 98 (09 Jul 2024 11:34), Max: 98.3 (08 Jul 2024 20:16)  HR: 56 (09 Jul 2024 11:34) (56 - 81)  BP: 137/76 (09 Jul 2024 11:34) (137/76 - 180/77)  BP(mean): --  RR: 17 (09 Jul 2024 11:34) (14 - 18)  SpO2: 98% (09 Jul 2024 11:34) (96% - 99%)    Parameters below as of 09 Jul 2024 11:34  Patient On (Oxygen Delivery Method): room air        PHYSICAL EXAM:    GENERAL: NAD   HEAD:  Atraumatic, Normocephalic  EYES: EOMI, PERRLA   NECK: Supple   NERVOUS SYSTEM:  Alert & Oriented X3, Good concentration   CHEST/LUNG: Clear to percussion bilaterally; No rales, rhonchi, wheezing, or rubs  HEART: Regular rate and rhythm; No murmurs, rubs, or gallops  ABDOMEN: Soft, Nontender, Nondistended; Bowel sounds present  EXTREMITIES: No clubbing, cyanosis, or edema         LABS:                        13.2   4.99  )-----------( 213      ( 09 Jul 2024 06:00 )             36.9     07-09    134<L>  |  102  |  10  ----------------------------<  177<H>  3.6   |  23  |  1.14    Ca    9.6      09 Jul 2024 06:00  Phos  2.7     07-09  Mg     1.8     07-09    TPro  6.9  /  Alb  3.0<L>  /  TBili  2.2<H>  /  DBili  0.8<H>  /  AST  39<H>  /  ALT  64  /  AlkPhos  451<H>  07-09    PT/INR - ( 08 Jul 2024 17:49 )   PT: 13.3 sec;   INR: 1.12 ratio         PTT - ( 08 Jul 2024 17:49 )  PTT:38.0 sec  Urinalysis Basic - ( 09 Jul 2024 06:00 )    Color: x / Appearance: x / SG: x / pH: x  Gluc: 177 mg/dL / Ketone: x  / Bili: x / Urobili: x   Blood: x / Protein: x / Nitrite: x   Leuk Esterase: x / RBC: x / WBC x   Sq Epi: x / Non Sq Epi: x / Bacteria: x        RADIOLOGY & ADDITIONAL STUDIES:

## 2024-07-09 NOTE — PROGRESS NOTE ADULT - PROBLEM SELECTOR PLAN 2
-  Risks, benefits and alternatives including but not limited to pancreatitis were discussed at length with patient regarding endoscopic retrograde cholangio pancreatography   and informed consent obtained. All questions were answered.  wife at bedside    _NPO after midnight for ERCP   - IV hydration and ASA held .
- will need a repeat ERCP at tertiary hospital in 6-8 weeks with mechanical lithotripsy and stent removal  - start Kathrine 500 mg bid

## 2024-07-09 NOTE — CONSULT NOTE ADULT - ASSESSMENT
67yo M with PMH of CVA on ASA with right sided residual weakness, HTN, DM, who presents to ED c/o worsening abdominal pain. Patient states he's had abdominal pain for the past few month w/ history of cholelithiasis and choledocholithiasis scheduled to have an elective cholecystectomy on 7/10, came to the ED w/ worsening abd pain and was admitted w/ acute cholecystitis.    Acute cholecystitis  - as per surgery, patient is on Cipro/Flagyl    - ERCP with Dr. Cisneros today & cholecystectomy on 7/10 as per surgery   - recommend patient be put on DVT prophylaxis    Nonspecific asymmetric opacity in the region of the right apex  - will get a repeat film w/ apical lordotic technique as per radiology     Hx of CVA  - ASA held as patient is going to OR    HTN  - resume home Hydralazine  - will resume home Norvasc and Valsartan if BP remains elevated    DM II  - c/w ISS  - check Hgb A1C    67yo M with PMH of CVA on ASA with right sided residual weakness, HTN, DM II & chronic diastolic CHF who presents to ED c/o worsening abdominal pain. Patient states he's had abdominal pain for the past few month w/ history of cholelithiasis and choledocholithiasis scheduled to have an elective cholecystectomy on 7/10, came to the ED w/ worsening abd pain and was admitted w/ acute cholecystitis.    Acute cholecystitis  - as per surgery, patient is on Cipro/Flagyl    - ERCP with Dr. Cisneros today & cholecystectomy on 7/10 as per surgery   - recommend patient be put on DVT prophylaxis  - Echo on 12/19/23 showed EF 60% w/ grade II diastolic dysfunction   - stress test on 12/18/23 was without definite evidence of ischemia w/ EF% is 70 %. Pt has NSR on EKG at 68 BPM w/ 1st degree block.  CXR showed no infiltrates. Pt does have a history of CVA and CHF but is not on insulin and doesn't have CKD. He also reports walking up 36 steps daily without any probelms. Given his negative stress test and no current symptoms, he's at an intermediate cardiac risk for a intermediate risk surgical procedure and can proceed to the OR without further work up if the repeat CXR is normal     Nonspecific asymmetric opacity in the region of the right apex  - will get a repeat film w/ apical lordotic technique as per radiology     STANLEY   - likely prerenal   - resolving      Chronic diastolic CHF   - patient is on nebivolol at home, I spoke w/ pharmacy, who will order it as it's non-formulary   - will resume losartan tomorrow if BP is not low    Hx of CVA  - ASA held as patient is going to OR    HTN  - patient is on nebivolol at home, I spoke w/ pharmacy, who will order it as it's non-formulary   - will resume home Norvasc & losartan tomorrow if BP is not low     DM II  - c/w ISS  - check Hgb A1C

## 2024-07-09 NOTE — PATIENT PROFILE ADULT - FALL HARM RISK - HARM RISK INTERVENTIONS

## 2024-07-09 NOTE — PROGRESS NOTE ADULT - NS ATTEND AMEND GEN_ALL_CORE FT
68M with choledocholithiasis  ERCP today with Dr. Blayne Ely soft, non-tender    NPO, IVF  ERCP today    50 minutes spent with patient and coordinating care

## 2024-07-09 NOTE — PATIENT PROFILE ADULT - CENTRAL VENOUS CATHETER/PICC LINE
Detail Level: Detailed Add 61625 Cpt? (Important Note: In 2017 The Use Of 32590 Is Being Tracked By Cms To Determine Future Global Period Reimbursement For Global Periods): no Wound Evaluated By: Ladonna no

## 2024-07-10 ENCOUNTER — RESULT REVIEW (OUTPATIENT)
Age: 69
End: 2024-07-10

## 2024-07-10 ENCOUNTER — TRANSCRIPTION ENCOUNTER (OUTPATIENT)
Age: 69
End: 2024-07-10

## 2024-07-10 ENCOUNTER — APPOINTMENT (OUTPATIENT)
Dept: SURGERY | Facility: HOSPITAL | Age: 69
End: 2024-07-10

## 2024-07-10 LAB
ALBUMIN SERPL ELPH-MCNC: 2.9 G/DL — LOW (ref 3.3–5)
ALP SERPL-CCNC: 384 U/L — HIGH (ref 40–120)
ALT FLD-CCNC: 54 U/L — SIGNIFICANT CHANGE UP (ref 12–78)
ANION GAP SERPL CALC-SCNC: 6 MMOL/L — SIGNIFICANT CHANGE UP (ref 5–17)
AST SERPL-CCNC: 25 U/L — SIGNIFICANT CHANGE UP (ref 15–37)
BILIRUB DIRECT SERPL-MCNC: 0.8 MG/DL — HIGH (ref 0–0.3)
BILIRUB INDIRECT FLD-MCNC: 1 MG/DL — SIGNIFICANT CHANGE UP (ref 0.2–1)
BILIRUB SERPL-MCNC: 1.8 MG/DL — HIGH (ref 0.2–1.2)
BUN SERPL-MCNC: 13 MG/DL — SIGNIFICANT CHANGE UP (ref 7–23)
CALCIUM SERPL-MCNC: 9.6 MG/DL — SIGNIFICANT CHANGE UP (ref 8.5–10.1)
CHLORIDE SERPL-SCNC: 101 MMOL/L — SIGNIFICANT CHANGE UP (ref 96–108)
CO2 SERPL-SCNC: 26 MMOL/L — SIGNIFICANT CHANGE UP (ref 22–31)
CREAT SERPL-MCNC: 1.15 MG/DL — SIGNIFICANT CHANGE UP (ref 0.5–1.3)
EGFR: 69 ML/MIN/1.73M2 — SIGNIFICANT CHANGE UP
GLUCOSE BLDC GLUCOMTR-MCNC: 216 MG/DL — HIGH (ref 70–99)
GLUCOSE BLDC GLUCOMTR-MCNC: 245 MG/DL — HIGH (ref 70–99)
GLUCOSE BLDC GLUCOMTR-MCNC: 266 MG/DL — HIGH (ref 70–99)
GLUCOSE SERPL-MCNC: 235 MG/DL — HIGH (ref 70–99)
HCT VFR BLD CALC: 37.6 % — LOW (ref 39–50)
HGB BLD-MCNC: 13.2 G/DL — SIGNIFICANT CHANGE UP (ref 13–17)
MAGNESIUM SERPL-MCNC: 1.9 MG/DL — SIGNIFICANT CHANGE UP (ref 1.6–2.6)
MCHC RBC-ENTMCNC: 29.7 PG — SIGNIFICANT CHANGE UP (ref 27–34)
MCHC RBC-ENTMCNC: 35.1 G/DL — SIGNIFICANT CHANGE UP (ref 32–36)
MCV RBC AUTO: 84.7 FL — SIGNIFICANT CHANGE UP (ref 80–100)
NRBC # BLD: 0 /100 WBCS — SIGNIFICANT CHANGE UP (ref 0–0)
PHOSPHATE SERPL-MCNC: 3.1 MG/DL — SIGNIFICANT CHANGE UP (ref 2.5–4.5)
PLATELET # BLD AUTO: 209 K/UL — SIGNIFICANT CHANGE UP (ref 150–400)
POTASSIUM SERPL-MCNC: 4.2 MMOL/L — SIGNIFICANT CHANGE UP (ref 3.5–5.3)
POTASSIUM SERPL-SCNC: 4.2 MMOL/L — SIGNIFICANT CHANGE UP (ref 3.5–5.3)
PROT SERPL-MCNC: 6.7 GM/DL — SIGNIFICANT CHANGE UP (ref 6–8.3)
RBC # BLD: 4.44 M/UL — SIGNIFICANT CHANGE UP (ref 4.2–5.8)
RBC # FLD: 12.2 % — SIGNIFICANT CHANGE UP (ref 10.3–14.5)
SODIUM SERPL-SCNC: 133 MMOL/L — LOW (ref 135–145)
WBC # BLD: 5.48 K/UL — SIGNIFICANT CHANGE UP (ref 3.8–10.5)
WBC # FLD AUTO: 5.48 K/UL — SIGNIFICANT CHANGE UP (ref 3.8–10.5)

## 2024-07-10 PROCEDURE — 47562 LAPAROSCOPIC CHOLECYSTECTOMY: CPT | Mod: AS

## 2024-07-10 PROCEDURE — 88304 TISSUE EXAM BY PATHOLOGIST: CPT | Mod: 26

## 2024-07-10 PROCEDURE — 99232 SBSQ HOSP IP/OBS MODERATE 35: CPT

## 2024-07-10 PROCEDURE — 47562 LAPAROSCOPIC CHOLECYSTECTOMY: CPT

## 2024-07-10 DEVICE — CLIP APPLIER COVIDIEN ENDOCLIP III 5MM: Type: IMPLANTABLE DEVICE | Status: FUNCTIONAL

## 2024-07-10 DEVICE — CLIP APPLIER COVIDIEN ENDOCLIP II 10MM MED/LG: Type: IMPLANTABLE DEVICE | Status: FUNCTIONAL

## 2024-07-10 DEVICE — CHOLANGIOGRAM CATH SYMMETRY REDDICK 4FR X 50CM WITH STRAIGHT INTRODUCER: Type: IMPLANTABLE DEVICE | Status: FUNCTIONAL

## 2024-07-10 RX ORDER — INSULIN LISPRO 100 [IU]/ML
INJECTION, SOLUTION SUBCUTANEOUS AT BEDTIME
Refills: 0 | Status: DISCONTINUED | OUTPATIENT
Start: 2024-07-10 | End: 2024-07-11

## 2024-07-10 RX ORDER — OXYCODONE HYDROCHLORIDE 100 MG/5ML
5 SOLUTION ORAL EVERY 4 HOURS
Refills: 0 | Status: DISCONTINUED | OUTPATIENT
Start: 2024-07-10 | End: 2024-07-11

## 2024-07-10 RX ORDER — INSULIN LISPRO 100 [IU]/ML
INJECTION, SOLUTION SUBCUTANEOUS
Refills: 0 | Status: DISCONTINUED | OUTPATIENT
Start: 2024-07-10 | End: 2024-07-11

## 2024-07-10 RX ORDER — DEXTROSE MONOHYDRATE AND SODIUM CHLORIDE 5; .3 G/100ML; G/100ML
1000 INJECTION, SOLUTION INTRAVENOUS
Refills: 0 | Status: DISCONTINUED | OUTPATIENT
Start: 2024-07-10 | End: 2024-07-11

## 2024-07-10 RX ORDER — URSODIOL 300 MG/1
500 CAPSULE ORAL EVERY 12 HOURS
Refills: 0 | Status: DISCONTINUED | OUTPATIENT
Start: 2024-07-10 | End: 2024-07-11

## 2024-07-10 RX ORDER — DEXTROSE 30 % IN WATER 30 %
12.5 VIAL (ML) INTRAVENOUS ONCE
Refills: 0 | Status: DISCONTINUED | OUTPATIENT
Start: 2024-07-10 | End: 2024-07-11

## 2024-07-10 RX ORDER — AMLODIPINE BESYLATE 2.5 MG/1
10 TABLET ORAL DAILY
Refills: 0 | Status: DISCONTINUED | OUTPATIENT
Start: 2024-07-10 | End: 2024-07-11

## 2024-07-10 RX ORDER — ATORVASTATIN CALCIUM 20 MG/1
80 TABLET, FILM COATED ORAL AT BEDTIME
Refills: 0 | Status: DISCONTINUED | OUTPATIENT
Start: 2024-07-10 | End: 2024-07-11

## 2024-07-10 RX ORDER — DEXTROSE 30 % IN WATER 30 %
25 VIAL (ML) INTRAVENOUS ONCE
Refills: 0 | Status: DISCONTINUED | OUTPATIENT
Start: 2024-07-10 | End: 2024-07-11

## 2024-07-10 RX ORDER — FENTANYL CITRATE 50 UG/ML
25 INJECTION, SOLUTION INTRAMUSCULAR; INTRAVENOUS
Refills: 0 | Status: DISCONTINUED | OUTPATIENT
Start: 2024-07-10 | End: 2024-07-10

## 2024-07-10 RX ORDER — LOSARTAN POTASSIUM 100 MG/1
100 TABLET, FILM COATED ORAL DAILY
Refills: 0 | Status: DISCONTINUED | OUTPATIENT
Start: 2024-07-10 | End: 2024-07-11

## 2024-07-10 RX ORDER — DEXTROSE 30 % IN WATER 30 %
15 VIAL (ML) INTRAVENOUS ONCE
Refills: 0 | Status: DISCONTINUED | OUTPATIENT
Start: 2024-07-10 | End: 2024-07-11

## 2024-07-10 RX ORDER — GLUCAGON HYDROCHLORIDE 1 MG/ML
1 INJECTION, POWDER, FOR SOLUTION INTRAMUSCULAR; INTRAVENOUS; SUBCUTANEOUS ONCE
Refills: 0 | Status: DISCONTINUED | OUTPATIENT
Start: 2024-07-10 | End: 2024-07-11

## 2024-07-10 RX ORDER — HEPARIN SODIUM 50 [USP'U]/ML
5000 INJECTION, SOLUTION INTRAVENOUS EVERY 8 HOURS
Refills: 0 | Status: DISCONTINUED | OUTPATIENT
Start: 2024-07-10 | End: 2024-07-11

## 2024-07-10 RX ORDER — FENTANYL CITRATE 50 UG/ML
50 INJECTION, SOLUTION INTRAMUSCULAR; INTRAVENOUS ONCE
Refills: 0 | Status: DISCONTINUED | OUTPATIENT
Start: 2024-07-10 | End: 2024-07-10

## 2024-07-10 RX ORDER — ONDANSETRON HYDROCHLORIDE 2 MG/ML
4 INJECTION INTRAMUSCULAR; INTRAVENOUS ONCE
Refills: 0 | Status: DISCONTINUED | OUTPATIENT
Start: 2024-07-10 | End: 2024-07-10

## 2024-07-10 RX ORDER — HYDROMORPHONE HCL 0.2 MG/ML
0.5 INJECTION, SOLUTION INTRAVENOUS ONCE
Refills: 0 | Status: DISCONTINUED | OUTPATIENT
Start: 2024-07-10 | End: 2024-07-10

## 2024-07-10 RX ORDER — ACETAMINOPHEN 325 MG
975 TABLET ORAL EVERY 6 HOURS
Refills: 0 | Status: DISCONTINUED | OUTPATIENT
Start: 2024-07-10 | End: 2024-07-11

## 2024-07-10 RX ADMIN — ATORVASTATIN CALCIUM 80 MILLIGRAM(S): 20 TABLET, FILM COATED ORAL at 22:06

## 2024-07-10 RX ADMIN — Medication 975 MILLIGRAM(S): at 18:06

## 2024-07-10 RX ADMIN — DEXTROSE MONOHYDRATE AND SODIUM CHLORIDE 75 MILLILITER(S): 5; .3 INJECTION, SOLUTION INTRAVENOUS at 17:14

## 2024-07-10 RX ADMIN — Medication 975 MILLIGRAM(S): at 23:06

## 2024-07-10 RX ADMIN — INSULIN LISPRO 4: 100 INJECTION, SOLUTION SUBCUTANEOUS at 06:49

## 2024-07-10 RX ADMIN — NEBIVOLOL 5 MILLIGRAM(S): 5 TABLET ORAL at 05:16

## 2024-07-10 RX ADMIN — HEPARIN SODIUM 5000 UNIT(S): 50 INJECTION, SOLUTION INTRAVENOUS at 22:06

## 2024-07-10 RX ADMIN — HYDROMORPHONE HCL 0.5 MILLIGRAM(S): 0.2 INJECTION, SOLUTION INTRAVENOUS at 14:45

## 2024-07-10 RX ADMIN — DEXTROSE MONOHYDRATE AND SODIUM CHLORIDE 140 MILLILITER(S): 5; .3 INJECTION, SOLUTION INTRAVENOUS at 06:51

## 2024-07-10 RX ADMIN — Medication 975 MILLIGRAM(S): at 22:06

## 2024-07-10 RX ADMIN — DEXTROSE MONOHYDRATE AND SODIUM CHLORIDE 75 MILLILITER(S): 5; .3 INJECTION, SOLUTION INTRAVENOUS at 22:09

## 2024-07-10 RX ADMIN — Medication 200 MILLIGRAM(S): at 05:15

## 2024-07-10 RX ADMIN — METRONIDAZOLE 100 MILLIGRAM(S): 500 TABLET ORAL at 05:16

## 2024-07-10 RX ADMIN — INSULIN LISPRO 3: 100 INJECTION, SOLUTION SUBCUTANEOUS at 17:13

## 2024-07-10 RX ADMIN — HYDROMORPHONE HCL 0.5 MILLIGRAM(S): 0.2 INJECTION, SOLUTION INTRAVENOUS at 15:12

## 2024-07-10 RX ADMIN — Medication 975 MILLIGRAM(S): at 17:13

## 2024-07-11 ENCOUNTER — TRANSCRIPTION ENCOUNTER (OUTPATIENT)
Age: 69
End: 2024-07-11

## 2024-07-11 VITALS — TEMPERATURE: 98 F

## 2024-07-11 LAB
ALBUMIN SERPL ELPH-MCNC: 2.9 G/DL — LOW (ref 3.3–5)
ALP SERPL-CCNC: 307 U/L — HIGH (ref 40–120)
ALT FLD-CCNC: 54 U/L — SIGNIFICANT CHANGE UP (ref 12–78)
ANION GAP SERPL CALC-SCNC: 8 MMOL/L — SIGNIFICANT CHANGE UP (ref 5–17)
AST SERPL-CCNC: 35 U/L — SIGNIFICANT CHANGE UP (ref 15–37)
BASOPHILS # BLD AUTO: 0.05 K/UL — SIGNIFICANT CHANGE UP (ref 0–0.2)
BASOPHILS NFR BLD AUTO: 0.6 % — SIGNIFICANT CHANGE UP (ref 0–2)
BILIRUB DIRECT SERPL-MCNC: 0.9 MG/DL — HIGH (ref 0–0.3)
BILIRUB INDIRECT FLD-MCNC: 1.2 MG/DL — HIGH (ref 0.2–1)
BILIRUB SERPL-MCNC: 2.1 MG/DL — HIGH (ref 0.2–1.2)
BUN SERPL-MCNC: 15 MG/DL — SIGNIFICANT CHANGE UP (ref 7–23)
CALCIUM SERPL-MCNC: 9 MG/DL — SIGNIFICANT CHANGE UP (ref 8.5–10.1)
CHLORIDE SERPL-SCNC: 101 MMOL/L — SIGNIFICANT CHANGE UP (ref 96–108)
CO2 SERPL-SCNC: 25 MMOL/L — SIGNIFICANT CHANGE UP (ref 22–31)
CREAT SERPL-MCNC: 1.08 MG/DL — SIGNIFICANT CHANGE UP (ref 0.5–1.3)
EGFR: 74 ML/MIN/1.73M2 — SIGNIFICANT CHANGE UP
EOSINOPHIL # BLD AUTO: 0.04 K/UL — SIGNIFICANT CHANGE UP (ref 0–0.5)
EOSINOPHIL NFR BLD AUTO: 0.5 % — SIGNIFICANT CHANGE UP (ref 0–6)
GLUCOSE BLDC GLUCOMTR-MCNC: 167 MG/DL — HIGH (ref 70–99)
GLUCOSE BLDC GLUCOMTR-MCNC: 224 MG/DL — HIGH (ref 70–99)
GLUCOSE SERPL-MCNC: 182 MG/DL — HIGH (ref 70–99)
HCT VFR BLD CALC: 36.3 % — LOW (ref 39–50)
HGB BLD-MCNC: 12.9 G/DL — LOW (ref 13–17)
IMM GRANULOCYTES NFR BLD AUTO: 0.4 % — SIGNIFICANT CHANGE UP (ref 0–0.9)
LYMPHOCYTES # BLD AUTO: 1.69 K/UL — SIGNIFICANT CHANGE UP (ref 1–3.3)
LYMPHOCYTES # BLD AUTO: 21.9 % — SIGNIFICANT CHANGE UP (ref 13–44)
MCHC RBC-ENTMCNC: 30.2 PG — SIGNIFICANT CHANGE UP (ref 27–34)
MCHC RBC-ENTMCNC: 35.5 G/DL — SIGNIFICANT CHANGE UP (ref 32–36)
MCV RBC AUTO: 85 FL — SIGNIFICANT CHANGE UP (ref 80–100)
MONOCYTES # BLD AUTO: 0.56 K/UL — SIGNIFICANT CHANGE UP (ref 0–0.9)
MONOCYTES NFR BLD AUTO: 7.2 % — SIGNIFICANT CHANGE UP (ref 2–14)
NEUTROPHILS # BLD AUTO: 5.36 K/UL — SIGNIFICANT CHANGE UP (ref 1.8–7.4)
NEUTROPHILS NFR BLD AUTO: 69.4 % — SIGNIFICANT CHANGE UP (ref 43–77)
NRBC # BLD: 0 /100 WBCS — SIGNIFICANT CHANGE UP (ref 0–0)
PLATELET # BLD AUTO: 229 K/UL — SIGNIFICANT CHANGE UP (ref 150–400)
POTASSIUM SERPL-MCNC: 3.3 MMOL/L — LOW (ref 3.5–5.3)
POTASSIUM SERPL-SCNC: 3.3 MMOL/L — LOW (ref 3.5–5.3)
PROT SERPL-MCNC: 6.4 GM/DL — SIGNIFICANT CHANGE UP (ref 6–8.3)
RBC # BLD: 4.27 M/UL — SIGNIFICANT CHANGE UP (ref 4.2–5.8)
RBC # FLD: 12.5 % — SIGNIFICANT CHANGE UP (ref 10.3–14.5)
SODIUM SERPL-SCNC: 134 MMOL/L — LOW (ref 135–145)
WBC # BLD: 7.73 K/UL — SIGNIFICANT CHANGE UP (ref 3.8–10.5)
WBC # FLD AUTO: 7.73 K/UL — SIGNIFICANT CHANGE UP (ref 3.8–10.5)

## 2024-07-11 PROCEDURE — 99232 SBSQ HOSP IP/OBS MODERATE 35: CPT

## 2024-07-11 PROCEDURE — 71046 X-RAY EXAM CHEST 2 VIEWS: CPT | Mod: 26

## 2024-07-11 RX ORDER — POTASSIUM CHLORIDE 600 MG/1
40 TABLET, FILM COATED, EXTENDED RELEASE ORAL EVERY 4 HOURS
Refills: 0 | Status: COMPLETED | OUTPATIENT
Start: 2024-07-11 | End: 2024-07-11

## 2024-07-11 RX ORDER — OXYCODONE HYDROCHLORIDE 100 MG/5ML
1 SOLUTION ORAL
Qty: 9 | Refills: 0
Start: 2024-07-11 | End: 2024-07-13

## 2024-07-11 RX ORDER — OXYCODONE HYDROCHLORIDE 100 MG/5ML
1 SOLUTION ORAL
Qty: 12 | Refills: 0
Start: 2024-07-11 | End: 2024-07-13

## 2024-07-11 RX ORDER — ACETAMINOPHEN 325 MG
1000 TABLET ORAL ONCE
Refills: 0 | Status: COMPLETED | OUTPATIENT
Start: 2024-07-11 | End: 2024-07-11

## 2024-07-11 RX ORDER — OXYCODONE HYDROCHLORIDE 100 MG/5ML
1 SOLUTION ORAL
Qty: 24 | Refills: 0
Start: 2024-07-11

## 2024-07-11 RX ADMIN — Medication 400 MILLIGRAM(S): at 11:53

## 2024-07-11 RX ADMIN — OXYCODONE HYDROCHLORIDE 5 MILLIGRAM(S): 100 SOLUTION ORAL at 13:51

## 2024-07-11 RX ADMIN — LOSARTAN POTASSIUM 100 MILLIGRAM(S): 100 TABLET, FILM COATED ORAL at 05:43

## 2024-07-11 RX ADMIN — POTASSIUM CHLORIDE 40 MILLIEQUIVALENT(S): 600 TABLET, FILM COATED, EXTENDED RELEASE ORAL at 13:19

## 2024-07-11 RX ADMIN — Medication 975 MILLIGRAM(S): at 05:43

## 2024-07-11 RX ADMIN — POTASSIUM CHLORIDE 40 MILLIEQUIVALENT(S): 600 TABLET, FILM COATED, EXTENDED RELEASE ORAL at 09:44

## 2024-07-11 RX ADMIN — HEPARIN SODIUM 5000 UNIT(S): 50 INJECTION, SOLUTION INTRAVENOUS at 05:42

## 2024-07-11 RX ADMIN — NEBIVOLOL 5 MILLIGRAM(S): 5 TABLET ORAL at 05:43

## 2024-07-11 RX ADMIN — URSODIOL 500 MILLIGRAM(S): 300 CAPSULE ORAL at 05:43

## 2024-07-11 RX ADMIN — INSULIN LISPRO 1: 100 INJECTION, SOLUTION SUBCUTANEOUS at 08:56

## 2024-07-11 RX ADMIN — OXYCODONE HYDROCHLORIDE 5 MILLIGRAM(S): 100 SOLUTION ORAL at 09:45

## 2024-07-11 RX ADMIN — AMLODIPINE BESYLATE 10 MILLIGRAM(S): 2.5 TABLET ORAL at 05:43

## 2024-07-11 RX ADMIN — OXYCODONE HYDROCHLORIDE 5 MILLIGRAM(S): 100 SOLUTION ORAL at 14:29

## 2024-07-11 RX ADMIN — Medication 1000 MILLIGRAM(S): at 12:50

## 2024-07-11 RX ADMIN — OXYCODONE HYDROCHLORIDE 5 MILLIGRAM(S): 100 SOLUTION ORAL at 10:38

## 2024-07-11 RX ADMIN — INSULIN LISPRO 2: 100 INJECTION, SOLUTION SUBCUTANEOUS at 11:55

## 2024-07-11 NOTE — PROGRESS NOTE ADULT - ASSESSMENT
67yo M with PMH of CVA on ASA with right sided residual weakness, HTN, DM II & chronic diastolic CHF who presents to ED c/o worsening abdominal pain. Patient states he's had abdominal pain for the past few month w/ history of cholelithiasis and choledocholithiasis scheduled to have an elective cholecystectomy on 7/10, came to the ED w/ worsening abd pain and was admitted w/ acute cholecystitis.    Acute cholecystitis  - as per surgery, patient is on Cipro/Flagyl    - s/p ERCP with Dr. Cisneros 7/9   - s/p cholecystectomy on 7/10      STANLEY   - likely prerenal   - resolving      Chronic diastolic CHF   - patient is on nebivolol at home, I spoke w/ pharmacy, who will order it as it's non-formulary   -  resume losartan if bp tolerates    Hx of CVA  - ASA held for surgery     HTN  - patient is on nebivolol at home, I spoke w/ pharmacy, who will order it as it's non-formulary   -  resume home Norvasc & losartan if bp tolerates    DM II  - c/w ISS  -  Hgb A1C   
67yo M with PMH of CVA on ASA with right sided residual weakness, HTN, DM II & chronic diastolic CHF who presents to ED c/o worsening abdominal pain. Patient states he's had abdominal pain for the past few month w/ history of cholelithiasis and choledocholithiasis scheduled to have an elective cholecystectomy on 7/10, came to the ED w/ worsening abd pain and was admitted w/ acute cholecystitis.    Acute cholecystitis  - as per surgery, patient is on Cipro/Flagyl    - s/p ERCP with Dr. Cisneros 7/9   - s/p cholecystectomy on 7/10  - recommend patient be put on DVT prophylaxis  - Echo on 12/19/23 showed EF 60% w/ grade II diastolic dysfunction   - stress test on 12/18/23 was without definite evidence of ischemia w/ EF% is 70 %. Pt has NSR on EKG at 68 BPM w/ 1st degree block.  CXR showed no infiltrates. Pt does have a history of CVA and CHF but is not on insulin and doesn't have CKD. He also reports walking up 36 steps daily without any probelms. Given his negative stress test and no current symptoms, he's at an intermediate cardiac risk for a intermediate risk surgical procedure and can proceed to the OR without further work up if the repeat CXR is normal     Nonspecific asymmetric opacity in the region of the right apex  - will get a repeat film w/ apical lordotic technique as per radiology     STANLEY   - likely prerenal   - resolving      Chronic diastolic CHF   - patient is on nebivolol at home, I spoke w/ pharmacy, who will order it as it's non-formulary   - will resume losartan if bp tolerates    Hx of CVA  - ASA held for surgery     HTN  - patient is on nebivolol at home, I spoke w/ pharmacy, who will order it as it's non-formulary   - will resume home Norvasc & losartan if bp tolerates    DM II  - c/w ISS  -  Hgb A1C   
69 yo M with PMH of CVA on ASA with right sided residual weakness, HTN, DM, who presents to ED c/o worsening abdominal pain x5 days. Patient has a known history of cholelithiasis and choledocholithiasis and was scheduled to have an elective cholecystectomy on 7/10, but he was unable to handle the pain. Patient; presentation now concerning for acute cholecystitis. US shows Thickened gallbladder measuring up to 7.3 mm with dilated common bile duct measuring up to 8mm.     ERCP 7/9 s/p sphincterotomy with gravel extraction  larger stone unable to be moved  stent deployed     - Continue Cipro/Flagyl, NPO, IVF. for Laparoscopic Cholecystectomy today  - analgesics prn, Continue Kathrine per GI  - Discussed with Dr Vidales
67yo M with PMH of CVA on ASA with right sided residual weakness, HTN, DM, who presents to ED c/o worsening ruq abdominal pain. and elevated LFTS suspicious for Choledocholithiasis 
69yo M with PMH of CVA on ASA with right sided residual weakness, HTN, DM, who presents to ED c/o worsening ruq abdominal pain. and elevated LFTS suspicious for Choledocholithiasis   ERCP 7/9 s/p sphincterotomy with gravel extraction  larger stone unable to be moved  stent deployed 
I have personally seen and examined the patient. I have collaborated with and supervised the

## 2024-07-11 NOTE — PROGRESS NOTE ADULT - SUBJECTIVE AND OBJECTIVE BOX
Patient is a 68y old  Male who presents with a chief complaint of acute angie/choledocho (11 Jul 2024 05:36)      INTERVAL HPI/OVERNIGHT EVENTS: no events    MEDICATIONS  (STANDING):  acetaminophen     Tablet .. 975 milliGRAM(s) Oral every 6 hours  amLODIPine   Tablet 10 milliGRAM(s) Oral daily  atorvastatin 80 milliGRAM(s) Oral at bedtime  dextrose 5%. 1000 milliLiter(s) (100 mL/Hr) IV Continuous <Continuous>  dextrose 5%. 1000 milliLiter(s) (50 mL/Hr) IV Continuous <Continuous>  dextrose 50% Injectable 25 Gram(s) IV Push once  dextrose 50% Injectable 12.5 Gram(s) IV Push once  dextrose 50% Injectable 25 Gram(s) IV Push once  glucagon  Injectable 1 milliGRAM(s) IntraMuscular once  heparin   Injectable 5000 Unit(s) SubCutaneous every 8 hours  insulin lispro (ADMELOG) corrective regimen sliding scale   SubCutaneous three times a day before meals  insulin lispro (ADMELOG) corrective regimen sliding scale   SubCutaneous at bedtime  lactated ringers. 1000 milliLiter(s) (75 mL/Hr) IV Continuous <Continuous>  losartan 100 milliGRAM(s) Oral daily  nebivolol 5 milliGRAM(s) Oral daily  ursodiol Tablet 500 milliGRAM(s) Oral every 12 hours    MEDICATIONS  (PRN):  dextrose Oral Gel 15 Gram(s) Oral once PRN Blood Glucose LESS THAN 70 milliGRAM(s)/deciliter  ondansetron Injectable 4 milliGRAM(s) IV Push every 6 hours PRN Nausea and/or Vomiting  oxyCODONE    IR 5 milliGRAM(s) Oral every 4 hours PRN Moderate Pain (4 - 6)      Allergies    penicillin (Swelling)    Intolerances        REVIEW OF SYSTEMS:  ROS negative unless stated otherwise.    Vital Signs Last 24 Hrs  T(C): 36.6 (11 Jul 2024 12:00), Max: 37.9 (11 Jul 2024 11:49)  T(F): 97.8 (11 Jul 2024 12:00), Max: 100.2 (11 Jul 2024 11:49)  HR: 61 (11 Jul 2024 11:49) (60 - 83)  BP: 150/73 (11 Jul 2024 11:49) (121/71 - 155/74)  BP(mean): --  RR: 18 (11 Jul 2024 11:49) (17 - 18)  SpO2: 95% (11 Jul 2024 11:49) (94% - 96%)    Parameters below as of 11 Jul 2024 11:49  Patient On (Oxygen Delivery Method): room air        PHYSICAL EXAM:  GENERAL: NAD  HEAD:  Atraumatic   EYES: EOMI   ENMT: Moist mucous membranes  NECK: Supple   NERVOUS SYSTEM:  Awake  CHEST/LUNG: CTAB   HEART: RRR   ABDOMEN: Soft, Nontender, obese abdomen, incision sites c/d/i  EXTREMITIES:   No clubbing, cyanosis, or edema  PSYCH: Mood appropriate    LABS:                        12.9   7.73  )-----------( 229      ( 11 Jul 2024 08:03 )             36.3     07-11    134<L>  |  101  |  15  ----------------------------<  182<H>  3.3<L>   |  25  |  1.08    Ca    9.0      11 Jul 2024 08:03  Phos  3.1     07-10  Mg     1.9     07-10    TPro  6.4  /  Alb  2.9<L>  /  TBili  2.1<H>  /  DBili  0.9<H>  /  AST  35  /  ALT  54  /  AlkPhos  307<H>  07-11      Urinalysis Basic - ( 11 Jul 2024 08:03 )    Color: x / Appearance: x / SG: x / pH: x  Gluc: 182 mg/dL / Ketone: x  / Bili: x / Urobili: x   Blood: x / Protein: x / Nitrite: x   Leuk Esterase: x / RBC: x / WBC x   Sq Epi: x / Non Sq Epi: x / Bacteria: x      CAPILLARY BLOOD GLUCOSE      POCT Blood Glucose.: 224 mg/dL (11 Jul 2024 11:31)  POCT Blood Glucose.: 167 mg/dL (11 Jul 2024 08:06)  POCT Blood Glucose.: 245 mg/dL (10 Jul 2024 21:25)  POCT Blood Glucose.: 266 mg/dL (10 Jul 2024 16:30)      RADIOLOGY & ADDITIONAL TESTS:    Imaging Personally Reviewed:  [ X] YES  [ ] NO    Consultant(s) Notes Reviewed:  [ X] YES  [ ] NO    Care Discussed with Consultants/Other Providers [X ] YES  [ ] NO
Surgery NP note  Patient seen and examined bedside resting comfortably.  No complaints offered. Abdominal pain is well controlled.  Denies nausea and vomiting. NPO for Laparoscopic Cholecystectomy today   Normal flatus/BM.     T(F): 98 (07-10-24 @ 14:16), Max: 98.1 (07-10-24 @ 06:35)  HR: 74 (07-10-24 @ 15:15) (60 - 77)  BP: 136/63 (07-10-24 @ 15:15) (128/68 - 188/83)  RR: 18 (07-10-24 @ 15:15) (14 - 24)  SpO2: 95% (07-10-24 @ 15:15) (94% - 98%)  Wt(kg): --  CAPILLARY BLOOD GLUCOSE      POCT Blood Glucose.: 216 mg/dL (10 Jul 2024 06:30)  POCT Blood Glucose.: 251 mg/dL (09 Jul 2024 23:48)  POCT Blood Glucose.: 240 mg/dL (09 Jul 2024 18:41)  POCT Blood Glucose.: 162 mg/dL (09 Jul 2024 16:10)      PHYSICAL EXAM:  General: NAD, WDWN.   Neuro:  Alert & responsive  HEENT: NCAT, EOMI, conjunctiva clear  CV: +S1+S2 regular rate and rhythm  Lung: clear to ausculation bilaterally, respirations nonlabored, good inspiratory effort  Abdomen: soft, Non tender, No Distension. Normal active BS  Extremities: no pedal edema or calf tenderness noted     LABS:                        13.2   5.48  )-----------( 209      ( 10 Jul 2024 06:00 )             37.6     07-10    133<L>  |  101  |  13  ----------------------------<  235<H>  4.2   |  26  |  1.15    Ca    9.6      10 Jul 2024 06:00  Phos  3.1     07-10  Mg     1.9     07-10    TPro  6.7  /  Alb  2.9<L>  /  TBili  1.8<H>  /  DBili  0.8<H>  /  AST  25  /  ALT  54  /  AlkPhos  384<H>  07-10    LIVER FUNCTIONS - ( 10 Jul 2024 06:00 )  Alb: 2.9 g/dL / Pro: 6.7 gm/dL / ALK PHOS: 384 U/L / ALT: 54 U/L / AST: 25 U/L / GGT: x           PT/INR - ( 08 Jul 2024 17:49 )   PT: 13.3 sec;   INR: 1.12 ratio         PTT - ( 08 Jul 2024 17:49 )  PTT:38.0 sec  I&O's Detail    09 Jul 2024 07:01  -  10 Jul 2024 07:00  --------------------------------------------------------  IN:    IV PiggyBack: 200 mL    IV PiggyBack: 100 mL    Lactated Ringers: 125 mL    Lactated Ringers: 1680 mL  Total IN: 2105 mL    OUT:  Total OUT: 0 mL    Total NET: 2105 mL    
  Patient seen and examined bedside resting comfortably.  No complaints offered.   Abdominal pain is well controlled.  Denies nausea, vomiting, diarrhea, fevers, chills. Tolerating diet.  ambulating without difficulty       T(F): 97.4 (07-11-24 @ 04:21), Max: 98.1 (07-10-24 @ 06:35)  HR: 70 (07-11-24 @ 04:21) (61 - 83)  BP: 133/75 (07-11-24 @ 04:21) (121/71 - 188/83)  RR: 17 (07-11-24 @ 04:21) (14 - 20)  SpO2: 95% (07-11-24 @ 04:21) (94% - 98%)  Wt(kg): --  CAPILLARY BLOOD GLUCOSE      POCT Blood Glucose.: 245 mg/dL (10 Jul 2024 21:25)  POCT Blood Glucose.: 266 mg/dL (10 Jul 2024 16:30)  POCT Blood Glucose.: 216 mg/dL (10 Jul 2024 06:30)      PHYSICAL EXAM:  General: NAD  Neuro:  Alert & oriented x 3  HEENT: NCAT, EOMI, conjunctiva clear  Lung:respirations nonlabored, good inspiratory effort  Abdomen: soft, nondistended, laparoscopic incisions with steristrips C/D/I   Extremities: no pedal edema or calf tenderness noted     LABS:                        13.2   5.48  )-----------( 209      ( 10 Jul 2024 06:00 )             37.6     07-10    133<L>  |  101  |  13  ----------------------------<  235<H>  4.2   |  26  |  1.15    Ca    9.6      10 Jul 2024 06:00  Phos  3.1     07-10  Mg     1.9     07-10    TPro  6.7  /  Alb  2.9<L>  /  TBili  1.8<H>  /  DBili  0.8<H>  /  AST  25  /  ALT  54  /  AlkPhos  384<H>  07-10        I&O:         A/P; A/p; 69 yo M with PMH of CVA on ASA with right sided residual weakness, HTN, DM, known history of cholelithiasis and choledocholithiasis, now s/p ERCP 7/9 s/p sphincterotomy with gravel extraction  larger stone unable to be moved  stent deployed and POD 1 s/p lap angie  -c/w julianna 500 mg BID  -c/w multimodal pain control, incentive spirometry., OOB/ambulation   -vte ppx  -f/u am labs   -D/C planning for today  
  Patient seen and examined bedside resting comfortably.  No complaints offered. + flatus/ no BM.   Abdominal pain is well controlled.  Denies nausea, vomiting, diarrhea, fevers, chills. Tolerating diet.  ambulating without difficulty       T(F): 97.7 (07-10-24 @ 18:30), Max: 98.1 (07-10-24 @ 06:35)  HR: 71 (07-10-24 @ 18:30) (60 - 83)  BP: 121/71 (07-10-24 @ 18:30) (121/71 - 188/83)  RR: 17 (07-10-24 @ 18:30) (14 - 20)  SpO2: 96% (07-10-24 @ 18:30) (94% - 98%)  Wt(kg): --  CAPILLARY BLOOD GLUCOSE      POCT Blood Glucose.: 245 mg/dL (10 Jul 2024 21:25)  POCT Blood Glucose.: 266 mg/dL (10 Jul 2024 16:30)  POCT Blood Glucose.: 216 mg/dL (10 Jul 2024 06:30)  POCT Blood Glucose.: 251 mg/dL (09 Jul 2024 23:48)      PHYSICAL EXAM:  General: NAD  Neuro:  Alert & oriented x 3  HEENT: NCAT, EOMI, conjunctiva clear  Lung:respirations nonlabored, good inspiratory effort  Abdomen: soft, nondistended laparoscopic incisions with steri strips C/D/I  Extremities: no pedal edema or calf tenderness noted     LABS:                        13.2   5.48  )-----------( 209      ( 10 Jul 2024 06:00 )             37.6     07-10    133<L>  |  101  |  13  ----------------------------<  235<H>  4.2   |  26  |  1.15    Ca    9.6      10 Jul 2024 06:00  Phos  3.1     07-10  Mg     1.9     07-10    TPro  6.7  /  Alb  2.9<L>  /  TBili  1.8<H>  /  DBili  0.8<H>  /  AST  25  /  ALT  54  /  AlkPhos  384<H>  07-10        I&O:       A/p; 67 yo M with PMH of CVA on ASA with right sided residual weakness, HTN, DM, known history of cholelithiasis and choledocholithiasis, now s/p ERCP 7/9 s/p sphincterotomy with gravel extraction  larger stone unable to be moved  stent deployed and POD 0 s/p lap angie  -c/w julianna 500 mg BID  -c/w multimodal pain control, incentive spirometry., OOB/ambulation   -vte ppx  -d/c planning for tm  -f/u am labs     
Patient is a 68y old  Male who presents with a chief complaint of acute angie/choledocho (10 Jul 2024 10:40)      INTERVAL HPI/OVERNIGHT EVENTS: Overnight no acute events. Seen post op lab angie. Doing well. Family member at bedside.     MEDICATIONS  (STANDING):  ciprofloxacin   IVPB      ciprofloxacin   IVPB 400 milliGRAM(s) IV Intermittent every 12 hours  dextrose 10% Bolus 125 milliLiter(s) IV Bolus once  dextrose 5%. 1000 milliLiter(s) (100 mL/Hr) IV Continuous <Continuous>  dextrose 5%. 1000 milliLiter(s) (50 mL/Hr) IV Continuous <Continuous>  dextrose 50% Injectable 25 Gram(s) IV Push once  dextrose 50% Injectable 12.5 Gram(s) IV Push once  glucagon  Injectable 1 milliGRAM(s) IntraMuscular once  insulin lispro (ADMELOG) corrective regimen sliding scale   SubCutaneous every 6 hours  lactated ringers. 1000 milliLiter(s) (140 mL/Hr) IV Continuous <Continuous>  metroNIDAZOLE  IVPB 500 milliGRAM(s) IV Intermittent every 12 hours  nebivolol 5 milliGRAM(s) Oral daily    MEDICATIONS  (PRN):  dextrose Oral Gel 15 Gram(s) Oral once PRN Blood Glucose LESS THAN 70 milliGRAM(s)/deciliter  fentaNYL    Injectable 25 MICROGram(s) IV Push every 5 minutes PRN Moderate Pain (4 - 6)  fentaNYL    Injectable 50 MICROGram(s) IV Push once PRN Severe Pain (7 - 10)  morphine  - Injectable 2 milliGRAM(s) IV Push every 6 hours PRN Severe Pain (7 - 10)  ondansetron Injectable 4 milliGRAM(s) IV Push every 6 hours PRN Nausea and/or Vomiting  ondansetron Injectable 4 milliGRAM(s) IV Push once PRN Nausea and/or Vomiting      Allergies    penicillin (Swelling)    Intolerances        REVIEW OF SYSTEMS:  ROS negative unless stated otherwise.    Vital Signs Last 24 Hrs  T(C): 36.5 (10 Jul 2024 15:30), Max: 36.7 (09 Jul 2024 16:22)  T(F): 97.7 (10 Jul 2024 15:30), Max: 98.1 (10 Jul 2024 06:35)  HR: 76 (10 Jul 2024 15:30) (60 - 77)  BP: 155/79 (10 Jul 2024 15:30) (128/68 - 188/83)  BP(mean): 84 (10 Jul 2024 15:15) (80 - 92)  RR: 18 (10 Jul 2024 15:30) (14 - 24)  SpO2: 94% (10 Jul 2024 15:30) (94% - 98%)    Parameters below as of 10 Jul 2024 15:30  Patient On (Oxygen Delivery Method): room air        PHYSICAL EXAM:  GENERAL: NAD  HEAD:  Atraumatic   EYES: EOMI   ENMT: Moist mucous membranes  NECK: Supple   NERVOUS SYSTEM:  Awake  CHEST/LUNG: CTAB   HEART: RRR   ABDOMEN: Soft, Nontender, obese abdomen, incision sites c/d/i  EXTREMITIES:   No clubbing, cyanosis, or edema  PSYCH: Mood appropriate    LABS:                        13.2   5.48  )-----------( 209      ( 10 Jul 2024 06:00 )             37.6     07-10    133<L>  |  101  |  13  ----------------------------<  235<H>  4.2   |  26  |  1.15    Ca    9.6      10 Jul 2024 06:00  Phos  3.1     07-10  Mg     1.9     07-10    TPro  6.7  /  Alb  2.9<L>  /  TBili  1.8<H>  /  DBili  0.8<H>  /  AST  25  /  ALT  54  /  AlkPhos  384<H>  07-10    PT/INR - ( 08 Jul 2024 17:49 )   PT: 13.3 sec;   INR: 1.12 ratio         PTT - ( 08 Jul 2024 17:49 )  PTT:38.0 sec  Urinalysis Basic - ( 10 Jul 2024 06:00 )    Color: x / Appearance: x / SG: x / pH: x  Gluc: 235 mg/dL / Ketone: x  / Bili: x / Urobili: x   Blood: x / Protein: x / Nitrite: x   Leuk Esterase: x / RBC: x / WBC x   Sq Epi: x / Non Sq Epi: x / Bacteria: x      CAPILLARY BLOOD GLUCOSE      POCT Blood Glucose.: 216 mg/dL (10 Jul 2024 06:30)  POCT Blood Glucose.: 251 mg/dL (09 Jul 2024 23:48)  POCT Blood Glucose.: 240 mg/dL (09 Jul 2024 18:41)  POCT Blood Glucose.: 162 mg/dL (09 Jul 2024 16:10)      RADIOLOGY & ADDITIONAL TESTS:    Imaging Personally Reviewed:  [ X] YES  [ ] NO    Consultant(s) Notes Reviewed:  [ X] YES  [ ] NO    Care Discussed with Consultants/Other Providers [X ] YES  [ ] NO
SURGERY PROGRESS HPI:  Pt seen and examined at bedside. Denies pain or complaints. Pt denies nausea and vomiting. Voiding well. Pt denies chest pain, SOB, dizziness, fever, chills. Ambulating.    Vital Signs Last 24 Hrs  T(C): 36.6 (09 Jul 2024 05:55), Max: 36.8 (08 Jul 2024 15:55)  T(F): 97.9 (09 Jul 2024 05:55), Max: 98.3 (08 Jul 2024 20:16)  HR: 60 (09 Jul 2024 05:55) (60 - 81)  BP: 164/78 (09 Jul 2024 05:55) (151/84 - 180/77)  BP(mean): --  RR: 16 (09 Jul 2024 05:55) (14 - 18)  SpO2: 96% (09 Jul 2024 05:55) (96% - 99%)    Parameters below as of 08 Jul 2024 20:16  Patient On (Oxygen Delivery Method): room air    PHYSICAL EXAM:    CONSTITUTIONAL: NAD  HEENT:  Atraumatic, Normocephalic  RESPIRATORY: Clear to ausculation, bilaterally   CARDIOVASCULAR: RRR S1S2  GASTROINTESTINAL: non distended, +BS, soft, non tender, no guarding  MUSCULOSKELETAL: calf soft, non tender b/l    I&O's Detail    08 Jul 2024 07:01  -  09 Jul 2024 06:11  --------------------------------------------------------  IN:  Total IN: 0 mL    OUT:    Voided (mL): 1250 mL  Total OUT: 1250 mL    Total NET: -1250 mL    LABS:                        13.9   6.64  )-----------( 233      ( 08 Jul 2024 17:49 )             38.9     07-08    131<L>  |  97  |  13  ----------------------------<  199<H>  3.7   |  24  |  1.45<H>    Ca    9.8      08 Jul 2024 17:49    TPro  7.7  /  Alb  3.4  /  TBili  2.5<H>  /  DBili  x   /  AST  59<H>  /  ALT  80<H>  /  AlkPhos  532<H>  07-08    PT/INR - ( 08 Jul 2024 17:49 )   PT: 13.3 sec;   INR: 1.12 ratio         PTT - ( 08 Jul 2024 17:49 )  PTT:38.0 sec  Urinalysis Basic - ( 08 Jul 2024 17:49 )    Color: x / Appearance: x / SG: x / pH: x  Gluc: 199 mg/dL / Ketone: x  / Bili: x / Urobili: x   Blood: x / Protein: x / Nitrite: x   Leuk Esterase: x / RBC: x / WBC x   Sq Epi: x / Non Sq Epi: x / Bacteria: x    < from: US Abdomen Complete (US Abdomen Complete .) (07.08.24 @ 23:29) >  FINDINGS:  Liver: Increased echogenicity.  Bile ducts: Normal caliber. Common bile duct measures 8 mm.  Gallbladder: No shadowing gallstone. The wall is mildly thickened   measuring 7.3 mm without pericholecystic fluid or sonographic Landry sign.  Pancreas: Visualized portions are within normal limits.  Spleen: 13.7 cm. Splenomegaly.  Right kidney: 11.2 cm. No hydronephrosis. 1.6 x 0.8 x 1.4 cm upper pole   cyst.  Left kidney: 11.6 cm. No hydronephrosis.  Ascites: None.  Aorta and IVC: Visualized portions are within normal limits.    IMPRESSION:  Heterogeneous echogenic liver.  Thickened gallbladder measuring up to 7.3 mm with dilated common bile   duct measuring up to 8mm. No shadowing gallstone, sonographic Landry   sign, or pericholecystic fluid.    < end of copied text >      67 yo M with PMH of CVA on ASA with right sided residual weakness, HTN, DM, who presents to ED c/o worsening abdominal pain x5 days. Patient has a known history of cholelithiasis and choledocholithiasis and was scheduled to have an elective cholecystectomy on 7/10, but he was unable to handle the pain. Patient; presentation now concerning for acute cholecystitis. US shows Thickened gallbladder measuring up to 7.3 mm with dilated common bile duct measuring up to 8mm.     - Continue Cipro/Flagyl, NPO, IVF  - analgesics prn  - ERCP with Dr. Cisneros on 7/9, and cholecystectomy on 7/10 with Dr. Vidales  - holding ASA  - medicine for comanagement/optimization     
ENDOSCOPIC RETROGRADE CHOLANGIO-PANCREATOGRAPHY (ERCP)    Anesthesia provided by: DR Manuel    INDICATION: CHOLEDOCHOLITHIASIS with cholecystitis      Procedure: Attending physician present for entire viewing of procedure. A history and physical examination were performed. The procedure, indications, potential complications (bleeding, perforation, infection, adverse medication reaction), and alternatives available were explained to the patient who appeared to understand and indicated this. Throughout the procedure the patient's blood pressure, pulse, and oxygen saturations were monitored continuously. Opportunity for questions was provided and informed consent obtained by the medical team.     The duodenoscope was introduced through the mouth, and under direct visualization advanced to the second part of the duodenum. The ERCP was accomplished without difficulty. The patient tolerated the procedure well.    Findings:   -The esophagus was normal with limited view secondary to side-viewing scope   -The stomach was normal in appearance.   - large periampullary diverticulum with papilla in mid septum  - Pancreatic duct normal to Head   -The bile duct was deeply cannulated with a glide wire was passed into the biliary tree into the intrahepatic ducts. Contrast was injected. I personally interpreted the bile duct images. Ductal flow of contrast was adequate. Image quality was adequate. Opacification of the entire biliary tree was successful. The maximum diameter of the common bile duct was 10-12 mm one large stone noted , no strictures, or other filling defects were seen  -A sphincterotomy was made with a monofilament traction (standard) sphincterotome using blended cut electrocautery. There was no post-sphincterotomy bleeding.   -The entire biliary tree was swept with a balloon starting at the bifurcation. stone was unable to be extracted from papilla only gravel and sludge   - 7 cm 5 FR double pigtail stent deployed   - OF NOTE CYSTIC DUCT NOT OPACIFIED   -An occlusion cholangiogram was obtained did reveal one residual filling defect with stent in place . 
Gastroenterology  Patient seen and examined bedside resting comfortably.  No complaints offered.   No abdominal pain  Denies nausea and vomiting. NPO  Normal flatus/BM.     T(F): 98 (07-09-24 @ 11:34), Max: 98.3 (07-08-24 @ 20:16)  HR: 56 (07-09-24 @ 11:34) (56 - 81)  BP: 137/76 (07-09-24 @ 11:34) (137/76 - 180/77)  RR: 17 (07-09-24 @ 11:34) (14 - 18)  SpO2: 98% (07-09-24 @ 11:34) (96% - 99%)  Wt(kg): --  CAPILLARY BLOOD GLUCOSE      POCT Blood Glucose.: 170 mg/dL (09 Jul 2024 12:07)  POCT Blood Glucose.: 142 mg/dL (09 Jul 2024 05:28)  POCT Blood Glucose.: 144 mg/dL (08 Jul 2024 23:46)  POCT Blood Glucose.: 153 mg/dL (08 Jul 2024 22:34)      PHYSICAL EXAM:  General: NAD, WDWN.   Neuro:  Alert & responsive  HEENT: NCAT, EOMI, conjunctiva clear  CV: +S1+S2 regular rate and rhythm  Lung: clear to ausculation bilaterally, respirations nonlabored, good inspiratory effort  Abdomen: soft, Non Tender, No distention Normal active BS  Extremities: no cyanosis, clubbing or edema    LABS:                        13.2   4.99  )-----------( 213      ( 09 Jul 2024 06:00 )             36.9     07-09    134<L>  |  102  |  10  ----------------------------<  177<H>  3.6   |  23  |  1.14    Ca    9.6      09 Jul 2024 06:00  Phos  2.7     07-09  Mg     1.8     07-09    TPro  6.9  /  Alb  3.0<L>  /  TBili  2.2<H>  /  DBili  0.8<H>  /  AST  39<H>  /  ALT  64  /  AlkPhos  451<H>  07-09    LIVER FUNCTIONS - ( 09 Jul 2024 06:00 )  Alb: 3.0 g/dL / Pro: 6.9 gm/dL / ALK PHOS: 451 U/L / ALT: 64 U/L / AST: 39 U/L / GGT: x           PT/INR - ( 08 Jul 2024 17:49 )   PT: 13.3 sec;   INR: 1.12 ratio         PTT - ( 08 Jul 2024 17:49 )  PTT:38.0 sec  I&O's Detail    08 Jul 2024 07:01  -  09 Jul 2024 07:00  --------------------------------------------------------  IN:  Total IN: 0 mL    OUT:    Voided (mL): 1250 mL  Total OUT: 1250 mL    Total NET: -1250 mL        07-09 @ 06:00    134 | 102 | 10  /9.6 | 1.8 | 2.7  _______________________/  3.6 | 23 | 1.14                           \par   Lipase: 74 U/L (07-09 @ 06:00)

## 2024-07-11 NOTE — DISCHARGE NOTE PROVIDER - NSDCFUADDAPPT_GEN_ALL_CORE_FT
Follow up with Dr. gonzalez in 1-2 weeks. Please call to schedule an appointment.  Please take Tylenol 1000mg  every 6 hours and Motrin 400mg every 6 hours, but alternate it so that you're taking pain medication every 3 hours. Please take Oxycodone ONLY for BREAKTHROUGH pain, as prescribed.    please follow up with Dr Cisneros in 1-2 weeks, call office to schedule appointment. you will need another ERCP to schedule    NOTIFY YOUR SURGEON IF: You have any bleeding that does not stop, any pus draining from your wound, any fever (over 100.4 F) or chills, persistent nausea/vomiting, persistent diarrhea, or if your pain is not controlled on your discharge pain medications.    Wound: You may shower after 48 hours. After showering, pat steri strips dry. Leave the white steri strips in place, they will fall off on their own in approximately 5-7 days or they will be removed at your follow up appointment.    Do not lift more than 15 lbs until cleared to do so by your surgeon.

## 2024-07-11 NOTE — DISCHARGE NOTE PROVIDER - HOSPITAL COURSE
67 yo M pmhx of CVA 2019 on asa, dm, htn, admitted with choledochelithiasis. pt admitted to surgical service, underwent ERCP with sphincterotomy with gravel extraction and stent deployement, with larger stone unable to be removed. pt started on Kathrine 500 mg bid. the following day pt underwent a laparoscopic cholecystectomy. At the time of discharge, the patient was hemodynamically stable, was tolerating PO diet, was ambulating, and was comfortable with adequate pain control. No nausea, vomiting, fever, chills. Patient instructed to follow up and to call the office to make an appointment. Patient instructed to call for any fever over 101, nausea, vomiting, severe pain, no passing of gas or bowel movement. Patient understood.

## 2024-07-11 NOTE — DISCHARGE NOTE PROVIDER - NSDCMRMEDTOKEN_GEN_ALL_CORE_FT
aspirin 325 mg oral tablet: 1 tab(s) orally once a day  docusate sodium 100 mg oral capsule: 1 cap(s) orally 3 times a day  losartan 100 mg oral tablet: 1 tab(s) orally once a day  metFORMIN 500 mg oral tablet, extended release: 1 tab(s) orally once a day  nebivolol 5 mg oral tablet: 1 tab(s) orally once a day  Norvasc 10 mg oral tablet: 1 tab(s) orally once a day  Omeprazole: 20 milligram(s) orally once a day  polyethylene glycol 3350 oral powder for reconstitution: 17 gram(s) orally once a day  rosuvastatin 20 mg oral capsule: 1 cap(s) orally once a day (at bedtime)  senna oral tablet: 2 tab(s) orally once a day (at bedtime)  Tradjenta 5 mg oral tablet: 1 tab(s) orally once a day   aspirin 325 mg oral tablet: 1 tab(s) orally once a day  docusate sodium 100 mg oral capsule: 1 cap(s) orally 3 times a day  losartan 100 mg oral tablet: 1 tab(s) orally once a day  metFORMIN 500 mg oral tablet, extended release: 1 tab(s) orally once a day  nebivolol 5 mg oral tablet: 1 tab(s) orally once a day  Norvasc 10 mg oral tablet: 1 tab(s) orally once a day  Omeprazole: 20 milligram(s) orally once a day  oxyCODONE 5 mg oral tablet: 1 tab(s) orally every 4 hours as needed for  moderate pain MDD: 6  polyethylene glycol 3350 oral powder for reconstitution: 17 gram(s) orally once a day  rosuvastatin 20 mg oral capsule: 1 cap(s) orally once a day (at bedtime)  senna oral tablet: 2 tab(s) orally once a day (at bedtime)  Tradjenta 5 mg oral tablet: 1 tab(s) orally once a day

## 2024-07-11 NOTE — DISCHARGE NOTE NURSING/CASE MANAGEMENT/SOCIAL WORK - PATIENT PORTAL LINK FT
You can access the FollowMyHealth Patient Portal offered by [facility] by registering at the following website: [email or web address]. By joining [facility]’s FollowMyHealth portal, you will also be able to view your health information using other applications (apps) compatible with our system.

## 2024-07-11 NOTE — DISCHARGE NOTE PROVIDER - PROVIDER TOKENS
PROVIDER:[TOKEN:[[id number]],FOLLOWUP:[2 weeks],ESTABLISHEDPATIENT:[T]],PROVIDER:[TOKEN:[[id number]],FOLLOWUP:[2 weeks],ESTABLISHEDPATIENT:[T]]

## 2024-07-11 NOTE — DISCHARGE NOTE NURSING/CASE MANAGEMENT/SOCIAL WORK - NSDCPEFALRISK_GEN_ALL_CORE
For information on Fall & Injury Prevention, visit: [email or web address] OR  [email or web address] OR  https://www.cdc.gov/steadi/patient.html

## 2024-07-11 NOTE — PROGRESS NOTE ADULT - REASON FOR ADMISSION
acute angie/choledocho

## 2024-07-11 NOTE — DISCHARGE NOTE PROVIDER - CARE PROVIDER_API CALL
Mejia Vidales  Surgery  [address]  [place] [address]  Phone: [phone]  Fax: [phone]  Established Patient  Follow Up Time: 2 weeks    Simone Cisneros  Gastroenterology  [address], [address]  [place] [address]  Phone: [phone]  Fax: [phone]  Established Patient  Follow Up Time: 2 weeks

## 2024-07-11 NOTE — DISCHARGE NOTE PROVIDER - NSDCCPCAREPLAN_GEN_ALL_CORE_FT
PRINCIPAL DISCHARGE DIAGNOSIS  Diagnosis: Gallstones  Assessment and Plan of Treatment:       SECONDARY DISCHARGE DIAGNOSES  Diagnosis: Gallbladder disease  Assessment and Plan of Treatment:

## 2024-07-11 NOTE — CHART NOTE - NSCHARTNOTEFT_GEN_A_CORE
Consult received for "MST Score =/>2 (unsure of wt loss)  Upon chart review patient with stable weight, does not currently meet criteria for Protein Calorie Malnutrition risk per MST.     Pt presented to ED c worsening abdominal pain; already scheduled for elective cholecystectomy  Pt now s/p eve angie (7/10); surgery managing diet advancement; diet upgraded yesterday; pt tolerating well  No physical signs of malnutrition observed  Provided Low Fat diet recommendations along c educational material for home reference  Pt scheduled to d/c later today    RD remains available for assessment per protocol or as needed.   Dayana Jiménez, MS, RD, CDN   [phone]

## 2024-07-11 NOTE — DISCHARGE NOTE PROVIDER - CARE PROVIDERS DIRECT ADDRESSES
[email or web address]

## 2024-07-12 LAB — SURGICAL PATHOLOGY STUDY: SIGNIFICANT CHANGE UP

## 2024-07-15 PROBLEM — I63.9 CEREBRAL INFARCTION, UNSPECIFIED: Chronic | Status: ACTIVE | Noted: 2024-07-08

## 2024-07-16 DIAGNOSIS — I69.351 HEMIPLEGIA AND HEMIPARESIS FOLLOWING CEREBRAL INFARCTION AFFECTING RIGHT DOMINANT SIDE: ICD-10-CM

## 2024-07-16 DIAGNOSIS — I50.32 CHRONIC DIASTOLIC (CONGESTIVE) HEART FAILURE: ICD-10-CM

## 2024-07-16 DIAGNOSIS — Z95.5 PRESENCE OF CORONARY ANGIOPLASTY IMPLANT AND GRAFT: ICD-10-CM

## 2024-07-16 DIAGNOSIS — R10.11 RIGHT UPPER QUADRANT PAIN: ICD-10-CM

## 2024-07-16 DIAGNOSIS — I25.10 ATHEROSCLEROTIC HEART DISEASE OF NATIVE CORONARY ARTERY WITHOUT ANGINA PECTORIS: ICD-10-CM

## 2024-07-16 DIAGNOSIS — I11.0 HYPERTENSIVE HEART DISEASE WITH HEART FAILURE: ICD-10-CM

## 2024-07-16 DIAGNOSIS — Z88.0 ALLERGY STATUS TO PENICILLIN: ICD-10-CM

## 2024-07-16 DIAGNOSIS — E11.9 TYPE 2 DIABETES MELLITUS WITHOUT COMPLICATIONS: ICD-10-CM

## 2024-07-16 DIAGNOSIS — N17.9 ACUTE KIDNEY FAILURE, UNSPECIFIED: ICD-10-CM

## 2024-07-16 DIAGNOSIS — Z79.85 LONG-TERM (CURRENT) USE OF INJECTABLE NON-INSULIN ANTIDIABETIC DRUGS: ICD-10-CM

## 2024-07-16 DIAGNOSIS — Z79.899 OTHER LONG TERM (CURRENT) DRUG THERAPY: ICD-10-CM

## 2024-07-16 DIAGNOSIS — Z79.82 LONG TERM (CURRENT) USE OF ASPIRIN: ICD-10-CM

## 2024-07-16 DIAGNOSIS — K80.46 CALCULUS OF BILE DUCT WITH ACUTE AND CHRONIC CHOLECYSTITIS WITHOUT OBSTRUCTION: ICD-10-CM

## 2024-07-22 ENCOUNTER — APPOINTMENT (OUTPATIENT)
Dept: SURGERY | Facility: CLINIC | Age: 69
End: 2024-07-22
Payer: MEDICARE

## 2024-07-22 VITALS
HEART RATE: 58 BPM | TEMPERATURE: 97.6 F | OXYGEN SATURATION: 99 % | HEIGHT: 67 IN | DIASTOLIC BLOOD PRESSURE: 94 MMHG | SYSTOLIC BLOOD PRESSURE: 162 MMHG | BODY MASS INDEX: 34.06 KG/M2 | WEIGHT: 217 LBS

## 2024-07-22 PROCEDURE — 99024 POSTOP FOLLOW-UP VISIT: CPT

## 2024-07-22 NOTE — HISTORY OF PRESENT ILLNESS
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 Has had for many months, has gotten worse over past month. CT scan revealed sigmoid diverticulitis (Hinchey 0), cholelithiasis, and choledocholithiasis. Patient also found to have a mild elevation in bilirubin to 1.7 on CMP performed 1/3/24. He has completed one week of cipro followed by one week of flagyl. Today Mr. Adams reports that his pain is much improved. He states that it comes an goes, is mild. He finds that he actually has two areas of pain, one in the pelvis on the right side, which is the pain that improved with antibiotics, and one under the ribs on the right side, which he notes is sometimes worse after eating but is always self resolved. Denies weight loss/gain, denies jaundice, denies nausea/vomiting, denies headaches. Has never had colonoscopy, has been using colo-guard. Had endoscopy a few years ago for GI bleed, is not sure what results of that were and not sure who performed it.  AVSS No acute distress No jaundice or scleral icterus Non labored respirations Abdomen is soft, non distended, no hernias. Negative Landry's sign. Patient has some mild tenderness with deep palpation of RLQ. No skin changes or scars.  Patient with mild diverticulitis and ongoing, but improved, symptoms as well as choledocholithiasis and biliary colic. I spoke with patient at length about each of these diagnoses and counseled him on when to seek emergent medical attention. Will resume cipro/flagyl fo 2 weeks and have patient follow up with me after. I have referred. Mr. Adams to Dr. Simone Cisneros for evaluation of choledocholithiasis.   Interval History: 6/6/24: Having more episodes of pain now, sometimes with night sweats (about twice per week). AVSS No jaundice Abdomen soft, non tender, non distended Have re-referred patient to GI for evaluation for ERCP, plan for lap angie thereafter. I have stressed the importance of follow up. Will call patient in a week to check in on progress of setting up GI eval.  6/27/24: Mr. Adams has ongoing pain. He saw Dr. Cisneros on 6/14 and is planned for ERCP. Plan for cholecystectomy thereafter. All questions answered to his satisfaction.  7/22/24:  Mr. Ward presents today with his wife for follow up s/p laparoscopic cholecystectomy 7/10/24. Patient is recovering well. Notes mild, post-prandial upper abdominal discomfort. Also notes vague vision changes without associated pain and c/o dizziness.  Exam AVSS A&Ox3, NAD, calm Respirations nonlabored Abdomen soft, NTND. Port sites well-healed, clean, and dry     Pathology explained to patient and he was given a copy to take home.  Advised Mr. Adams to follow up with his PCP and to take his BP medications as prescribed. He is to follow up with me in 2 weeks. If patient experiences any chest pain, shortness of breath, fever, vomiting, etc, he was advised to go to the nearest ER. Patient and wife expressed understanding.

## 2024-08-01 ENCOUNTER — APPOINTMENT (OUTPATIENT)
Dept: SURGERY | Facility: CLINIC | Age: 69
End: 2024-08-01
Payer: MEDICARE

## 2024-08-01 VITALS
BODY MASS INDEX: 33.27 KG/M2 | SYSTOLIC BLOOD PRESSURE: 124 MMHG | WEIGHT: 212 LBS | TEMPERATURE: 96.7 F | DIASTOLIC BLOOD PRESSURE: 79 MMHG | OXYGEN SATURATION: 99 % | HEART RATE: 69 BPM | HEIGHT: 67 IN

## 2024-08-01 PROCEDURE — 99024 POSTOP FOLLOW-UP VISIT: CPT

## 2024-08-01 RX ORDER — CLOTRIMAZOLE AND BETAMETHASONE DIPROPIONATE 10; .5 MG/ML; MG/ML
1-0.05 LOTION TOPICAL TWICE DAILY
Qty: 1 | Refills: 0 | Status: ACTIVE | COMMUNITY
Start: 2024-08-01 | End: 1900-01-01

## 2024-08-01 NOTE — HISTORY OF PRESENT ILLNESS
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 Has had for many months, has gotten worse over past month. CT scan revealed sigmoid diverticulitis (Hinchey 0), cholelithiasis, and choledocholithiasis. Patient also found to have a mild elevation in bilirubin to 1.7 on CMP performed 1/3/24. He has completed one week of cipro followed by one week of flagyl. Today Mr. Adams reports that his pain is much improved. He states that it comes an goes, is mild. He finds that he actually has two areas of pain, one in the pelvis on the right side, which is the pain that improved with antibiotics, and one under the ribs on the right side, which he notes is sometimes worse after eating but is always self resolved.  Denies weight loss/gain, denies jaundice, denies nausea/vomiting, denies headaches.  Has never had colonoscopy, has been using colo-guard. Had endoscopy a few years ago for GI bleed, is not sure what results of that were and not sure who performed it.  AVSS No acute distress No jaundice or scleral icterus Non labored respirations Abdomen is soft, non distended, no hernias. Negative Landry's sign. Patient has some mild tenderness with deep palpation of RLQ. No skin changes or scars.  Patient with mild diverticulitis and ongoing, but improved, symptoms as well as choledocholithiasis and biliary colic.  I spoke with patient at length about each of these diagnoses and counseled him on when to seek emergent medical attention. Will resume cipro/flagyl fo 2 weeks and have patient follow up with me after. I have referred. Mr. Adams to Dr. Simone Cisneros for evaluation of choledocholithiasis.  [de-identified] : 6/6/24: Having more episodes of pain now, sometimes with night sweats (about twice per week).  AVSS No jaundice Abdomen soft, non tender, non distended Have re-referred patient to GI for evaluation for ERCP, plan for lap angie thereafter. I have stressed the importance of follow up. Will call patient in a week to check in on progress of setting up GI eval.  6/27/24:  Mr. Adams has ongoing pain. He saw Dr. Cisneros on 6/14 and is planned for ERCP. Plan for cholecystectomy thereafter. All questions answered to his satisfaction.  7/22/24: Mr. Ward presents today with his wife for follow up s/p laparoscopic cholecystectomy 7/10/24. Patient is recovering well. Notes mild, post-prandial upper abdominal discomfort. Also notes vague vision changes without associated pain and c/o dizziness. Exam AVSS A&Ox3, NAD, calm Respirations nonlabored Abdomen soft, NTND. Port sites well-healed, clean, and dry  Pathology explained to patient and he was given a copy to take home. Advised Mr. Adams to follow up with his PCP and to take his BP medications as prescribed. He is to follow up with me in 2 weeks. If patient experiences any chest pain, shortness of breath, fever, vomiting, etc, he was advised to go to the nearest ER. Patient and wife expressed understanding.  8/1/24: Reports the pain is much improved with only mild ongoing abdominal wall discomfort in right brandt abdomen which is improving. Reports some diarrhea with fatty meals. Denies fevers/chills, nausea/vomiting. Is seeing GI later today for planned ERCP with possible lithotripsy. AVSS No acute distress Non labored respirations Abdomen soft, non tender, non distended, with well healed port sites.  Patient recovering well. Follow up with me after GI procedure. All questions answered to his satisfaction.

## 2024-09-18 ENCOUNTER — APPOINTMENT (OUTPATIENT)
Dept: INTERNAL MEDICINE | Facility: CLINIC | Age: 69
End: 2024-09-18
Payer: MEDICARE

## 2024-09-18 VITALS
TEMPERATURE: 97.2 F | DIASTOLIC BLOOD PRESSURE: 87 MMHG | BODY MASS INDEX: 34.37 KG/M2 | HEIGHT: 67 IN | WEIGHT: 219 LBS | SYSTOLIC BLOOD PRESSURE: 152 MMHG | HEART RATE: 72 BPM | OXYGEN SATURATION: 100 %

## 2024-09-18 DIAGNOSIS — I10 ESSENTIAL (PRIMARY) HYPERTENSION: ICD-10-CM

## 2024-09-18 DIAGNOSIS — E11.9 TYPE 2 DIABETES MELLITUS W/OUT COMPLICATIONS: ICD-10-CM

## 2024-09-18 DIAGNOSIS — R79.89 OTHER SPECIFIED ABNORMAL FINDINGS OF BLOOD CHEMISTRY: ICD-10-CM

## 2024-09-18 PROCEDURE — 99214 OFFICE O/P EST MOD 30 MIN: CPT

## 2024-09-18 PROCEDURE — G2211 COMPLEX E/M VISIT ADD ON: CPT

## 2024-09-18 PROCEDURE — 36415 COLL VENOUS BLD VENIPUNCTURE: CPT

## 2024-09-18 NOTE — ASSESSMENT
[FreeTextEntry1] : follow up  was having abdominal pain and was found to have choledocallitiasis s/p Gb surgery and removal of gallstones from the bile duct pain is improved but is having a difficult time with diet  DM- last hba1c 7.9 improved from 9.2  htn- elevated- didnt take meds yet this morning   Blood work drawn in office today

## 2024-09-18 NOTE — HEALTH RISK ASSESSMENT
[Yes] : Yes [Monthly or less (1 pt)] : Monthly or less (1 point) [1 or 2 (0 pts)] : 1 or 2 (0 points) [Never (0 pts)] : Never (0 points) [No] : In the past 12 months have you used drugs other than those required for medical reasons? No [No falls in past year] : Patient reported no falls in the past year [0] : 2) Feeling down, depressed, or hopeless: Not at all (0) [Never] : Never [Audit-CScore] : 1 [de-identified] : walking [de-identified] : healthy [[id number]] : 0

## 2024-09-18 NOTE — HISTORY OF PRESENT ILLNESS
[FreeTextEntry1] : follow up [de-identified] : follow up  was having abdominal pain and was found to have choledocallitiasis s/p Gb surgery and removal of gallstones from the bile duct pain is improved but is having a difficult time with diet  DM- last hba1c 7.9 improved from 9.2  htn- elevated- didnt take meds yet this morning

## 2024-09-20 LAB
25(OH)D3 SERPL-MCNC: 26.9 NG/ML
ALBUMIN SERPL ELPH-MCNC: 4.4 G/DL
ALP BLD-CCNC: 72 U/L
ALT SERPL-CCNC: 16 U/L
ANION GAP SERPL CALC-SCNC: 15 MMOL/L
APPEARANCE: CLEAR
AST SERPL-CCNC: 22 U/L
BASOPHILS # BLD AUTO: 0.06 K/UL
BASOPHILS NFR BLD AUTO: 1.1 %
BILIRUB SERPL-MCNC: 1.4 MG/DL
BILIRUBIN URINE: NEGATIVE
BLOOD URINE: NEGATIVE
BUN SERPL-MCNC: 12 MG/DL
CALCIUM SERPL-MCNC: 9.8 MG/DL
CHLORIDE SERPL-SCNC: 97 MMOL/L
CHOLEST SERPL-MCNC: 125 MG/DL
CO2 SERPL-SCNC: 23 MMOL/L
COLOR: YELLOW
CREAT SERPL-MCNC: 0.87 MG/DL
CREAT SPEC-SCNC: 56 MG/DL
EGFR: 93 ML/MIN/1.73M2
EOSINOPHIL # BLD AUTO: 0.13 K/UL
EOSINOPHIL NFR BLD AUTO: 2.3 %
ESTIMATED AVERAGE GLUCOSE: 154 MG/DL
FERRITIN SERPL-MCNC: 163 NG/ML
FOLATE SERPL-MCNC: 8.8 NG/ML
GGT SERPL-CCNC: 67 U/L
GLUCOSE QUALITATIVE U: 100 MG/DL
GLUCOSE SERPL-MCNC: 162 MG/DL
HBA1C MFR BLD HPLC: 7 %
HCT VFR BLD CALC: 42.7 %
HDLC SERPL-MCNC: 61 MG/DL
HGB BLD-MCNC: 14.1 G/DL
IMM GRANULOCYTES NFR BLD AUTO: 0.2 %
IRON SATN MFR SERPL: 31 %
IRON SERPL-MCNC: 97 UG/DL
KETONES URINE: NEGATIVE MG/DL
LDLC SERPL CALC-MCNC: 49 MG/DL
LEUKOCYTE ESTERASE URINE: NEGATIVE
LYMPHOCYTES # BLD AUTO: 1.68 K/UL
LYMPHOCYTES NFR BLD AUTO: 29.8 %
MAN DIFF?: NORMAL
MCHC RBC-ENTMCNC: 30.7 PG
MCHC RBC-ENTMCNC: 33 GM/DL
MCV RBC AUTO: 92.8 FL
MICROALBUMIN 24H UR DL<=1MG/L-MCNC: 3.7 MG/DL
MICROALBUMIN/CREAT 24H UR-RTO: 66 MG/G
MONOCYTES # BLD AUTO: 0.4 K/UL
MONOCYTES NFR BLD AUTO: 7.1 %
NEUTROPHILS # BLD AUTO: 3.35 K/UL
NEUTROPHILS NFR BLD AUTO: 59.5 %
NITRITE URINE: NEGATIVE
NONHDLC SERPL-MCNC: 64 MG/DL
PH URINE: 7
PLATELET # BLD AUTO: 180 K/UL
POTASSIUM SERPL-SCNC: 5.2 MMOL/L
PROT SERPL-MCNC: 6.6 G/DL
PROTEIN URINE: NORMAL MG/DL
RBC # BLD: 4.6 M/UL
RBC # FLD: 13.9 %
SODIUM SERPL-SCNC: 135 MMOL/L
SPECIFIC GRAVITY URINE: 1.01
TIBC SERPL-MCNC: 316 UG/DL
TRIGL SERPL-MCNC: 78 MG/DL
TSH SERPL-ACNC: 3.19 UIU/ML
UIBC SERPL-MCNC: 219 UG/DL
UROBILINOGEN URINE: 0.2 MG/DL
VIT B12 SERPL-MCNC: 350 PG/ML
WBC # FLD AUTO: 5.63 K/UL

## 2024-10-02 NOTE — PROGRESS NOTE ADULT - SUBJECTIVE AND OBJECTIVE BOX
Please stay on top of his iburpofen and tylenol dosing to help with his pain.  Please encourage fluids as much as possible.  Ensure for his diaper rash every time he has a bowel movement you clean him well, dry him completely, then apply the diaper cream.  Continue to monitor his wet diapers. Please return if he develops high fever, continued decreased wet diapers, becomes lethargic, or you notice blood in his stool.  PAIN AND FEVER MEDICATION DOSING CHART for CHILDREN    The following tables refer to children's ibuprofen and acetaminophen. Please be sure to look for any other active ingredients on the bottles (as might be found in multi-symptom or combination medications) and ask your doctor or pharmacist if you are unsure about the appropriate dosing or use.     IBUPROFEN DOSING (every 6-8 hours)   (EQUIVALENT TO MOTRIN OR ADVIL)    WEIGHT  AGE  INFANT DROPS  SUSPENSION  SUSPENSION CHEWABLES CHEWABLES   Strength    50 MG/ 1.25 ml  100 mg/5 ml    5 ml = (1 tsp)  160 mg/5 ml  50 mg   chewable 100 mg  chewable             12 -17  lbs  6-11 mo  1.25 ml  2.5 ml  1.75 ml      18 - 23 lbs   2 - 23 mo  1.875 ml     3.75 ml  2.5 ml      24 - 35 lbs  2 - 3 yr   2.5 ml    5 ml   3.75 ml  2 tabs   1 tab    36 - 47 lbs  4 - 5   NA  7.5 ml  5 ml  3 tabs   1.5 tabs    48 - 59 lbs   6 - 8 yr   NA    10 ml  7.5 ml  4 tabs  2 tabs    60 - 71 lbs  9 -10 yr  NA   12.5 ml  10 ml   2.5  tabs    72 - 95 lbs  11 yr     NA      15 ml  12.5 ml   3 tabs     ACETAMINOPHEN DOSING (every 4 - 6 hours)   (EQUIVALENT TO TYLENOL)     WEIGHT  AGE  DROPS  SUSPENSION CHEWABLES  CHEWABLES    Strength    80mg/0.8cc   160 mg/5cc 80mg   160mg     Pounds         6 -11 lbs  < 6 mo  1/2 dropper       12 - 17 lbs   6-11 mo  1 dropper  2.5 cc      18 - 23 lbs 12-23 mo  1.5 droppers  3.75 cc      24 - 35 lbs  2 - 3 yr  2 droppers  5 cc  2 tabs  1 tab    36 - 47 lbs  4 - 5 yr    7.5 cc  3 tabs  1.5 tabs    48 - 59 lbs  6 - 8 yr   10 cc  4 tabs  2 tabs    60 -  71 lbs  9 -10 yr    12.5 cc  5 tabs  2.5 tabs    72 - 95 lbs  11 yr   15 cc  6 tabs  3 tabs    > 95 lbs  > 13 yr     4 tabs      Subjective Complaints:  Historian:   patient      REVIEW OF SYSTEMS:  Eyes:  Good vision, no reported pain  ENT:  No sore throat, pain, runny nose, dysphagia  CV:  No pain, palpitatioins, hypo/hypertension  Resp:  No dyspnea, cough, tachypnea, wheezing  GI:  No pain, nausea, vomiting, diarrhea, constipatiion  Muscle:  No pain, weakness  Neuro:  No weakness, tingling, memory problems  Psych:  No fatigue, insomnia, mood problems, depression  Endocrine:  No polyuria, polydypsia, cold/heat intolerance    Vital Signs Last 24 Hrs  T(C): 35.8 (10 Jya 2019 05:00), Max: 36.8 (09 Jun 2019 18:30)  T(F): 96.5 (10 Jay 2019 05:00), Max: 98.2 (09 Jun 2019 18:30)  HR: 16 (10 Jay 2019 05:00) (16 - 85)  BP: 171/91 (10 Jay 2019 05:00) (151/76 - 190/90)  BP(mean): --  RR: 16 (10 Jay 2019 05:00) (16 - 17)  SpO2: 95% (10 Jay 2019 05:00) (95% - 98%)    GENERAL PHYSICAL EXAM:  General:  Appears stated age, well-groomed, well-nourished, no distress  HEENT:  NC/AT, patent nares w/ pink mucosa, OP clear w/o lesions, PERRL, EOMI, conjunctivae clear, no thyromegaly, nodules, adenopathy, no JVD  Chest:  Full & symmetric excursion, no increased effort, breath sounds clear  Cardiovascular:  Regular rhythm, S1, S2, no murmur/rub/S3/S4, no carotid/femoral/abdominal bruit, radial/pedal pulses 2+, no edema  Abdomen:  Soft, non-tender, non-distended, normoactive bowel sounds, no HSM  Extremities:  Gait & station:   Digits:   Nails:   Joints, Bones, Muscles:   ROM:   Stability:  Skin:  No rash/erythema/ecchymoses/petechiae/wounds/abscess/warm/dry  Musculoskeletal:  Full ROM in all joints w/o swelling/tenderness/effusion    NEUROLOGICAL EXAM:  HENT:  Normocephalic head; atraumatic head.  Neck supple.  ENT: normal looking.  Mental State:    Alert.  Fully oriented to person, place and date.  Coherent.  Speech dysarthric  Cooperative.  Responds appropriately.    Cranial Nerves:  II-XII:   Pupils round and reactive to light and accommodation.  Extraocular movements full.  Visual fields full (no homonymous hemianopsia).  Visual acuity wnl.Right   Facial weakness.  Tongue midline.  Motor Functions:  Moves all extremities.  right 4+/5 left 5/5    Sensory Functions:   Intact to touch and pinprick to face and extremities.    Reflexes:  Deep tendon reflexes normoactive to biceps, knees and ankles.  Babinski absent (present).  Cerebellar Testing:    Finger to nose intact.  Nystagmus absent.  Gait hesitant     LABS:                        14.0   5.00  )-----------( 161      ( 10 Jay 2019 06:13 )             40.7     06-10    138  |  103  |  18  ----------------------------<  131<H>  3.6   |  28  |  1.00    Ca    9.1      10 Jay 2019 06:13              RADIOLOGY & ADDITIONAL STUDIES:    POCT  Blood Glucose: 10:57 (06-09 @ 10:59)  lactulose Syrup:   10 Gram(s), Oral, once, Stop After 1 Doses  Administration Instructions: 10 Gram(s) in 15 mL  Provider's Contact #: [phone] (06-09 @ 11:04)  bisacodyl Suppository: [Ordered as DULCOLAX Suppository]  10 milliGRAM(s), Rectal, once, Stop After 1 Doses  Administration Instructions: for rectal use  Provider's Contact #: [phone] (06-09 @ 15:26)  Provider to RN:       pt can go to [place] with family in wheelchair (06-09 @ 15:44)  POCT  Blood Glucose: 16:48 (06-09 @ 16:58)  POCT  Blood Glucose: 22:06 (06-09 @ 22:06)  Basic Metabolic Panel: AM Sched. Collection: 10-Jay-2019 05:00 (06-09 @ 22:59)  Complete Blood Count + Automated Diff: AM Sched. Collection: 10-Jay-2019 05:00 (06-09 @ 22:59)  POCT  Blood Glucose: 07:25 (06-10 @ 07:27)  amLODIPine   Tablet: [Ordered as NORVASC]  5 milliGRAM(s), Oral, once, Stop After 1 Doses  Administration Instructions: This is a Look-alike/Sound-alike Medication  Provider's Contact #: [phone] (06-10 @ 09:57)  amLODIPine   Tablet: [Ordered as NORVASC]  10 milliGRAM(s), Oral, daily  Special Instructions: hold for sys <130  Administration Instructions: This is a Look-alike/Sound-alike Medication  Provider's Contact #: [phone] (06-10 @ 09:57)  DX Left periventrilar infarct ---right hemiparesis     Recommendations:   DVT prophylaxis as ordered.  Medications:  antiplatelets and statin

## 2024-10-30 NOTE — OCCUPATIONAL THERAPY INITIAL EVALUATION ADULT - LIGHT TOUCH SENSATION, LLE, REHAB EVAL
Other Plan: Slow Mohs - positive margin on prior excision (10/21/24 with Dr. Arellano) When Should The Patient Follow-Up For Their Next Full-Body Skin Exam?: 3 Months Detail Level: Detailed Anatomic Location From Referring Provider (Optional- Will Override The Chosen Location): right lateral proximal upper arm X Size Of Lesion In Cm (Optional): 0 within normal limits

## 2024-11-08 ENCOUNTER — RX RENEWAL (OUTPATIENT)
Age: 69
End: 2024-11-08

## 2025-01-15 ENCOUNTER — NON-APPOINTMENT (OUTPATIENT)
Age: 70
End: 2025-01-15

## 2025-01-15 ENCOUNTER — APPOINTMENT (OUTPATIENT)
Dept: CARDIOLOGY | Facility: CLINIC | Age: 70
End: 2025-01-15
Payer: MEDICARE

## 2025-01-15 VITALS
DIASTOLIC BLOOD PRESSURE: 80 MMHG | BODY MASS INDEX: 35.94 KG/M2 | WEIGHT: 229 LBS | SYSTOLIC BLOOD PRESSURE: 138 MMHG | HEIGHT: 67 IN | OXYGEN SATURATION: 99 % | RESPIRATION RATE: 16 BRPM | HEART RATE: 71 BPM

## 2025-01-15 DIAGNOSIS — I49.8 OTHER SPECIFIED CARDIAC ARRHYTHMIAS: ICD-10-CM

## 2025-01-15 PROCEDURE — G2211 COMPLEX E/M VISIT ADD ON: CPT

## 2025-01-15 PROCEDURE — 93000 ELECTROCARDIOGRAM COMPLETE: CPT

## 2025-01-15 PROCEDURE — 99214 OFFICE O/P EST MOD 30 MIN: CPT

## 2025-01-16 LAB
ALBUMIN SERPL ELPH-MCNC: 4.4 G/DL
ALP BLD-CCNC: 65 U/L
ALT SERPL-CCNC: 20 U/L
ANION GAP SERPL CALC-SCNC: 14 MMOL/L
AST SERPL-CCNC: 17 U/L
BILIRUB SERPL-MCNC: 2.4 MG/DL
BUN SERPL-MCNC: 13 MG/DL
CALCIUM SERPL-MCNC: 9.8 MG/DL
CHLORIDE SERPL-SCNC: 95 MMOL/L
CHOLEST SERPL-MCNC: 112 MG/DL
CO2 SERPL-SCNC: 24 MMOL/L
CREAT SERPL-MCNC: 0.99 MG/DL
EGFR: 82 ML/MIN/1.73M2
FOLATE SERPL-MCNC: 11.4 NG/ML
GLUCOSE SERPL-MCNC: 277 MG/DL
HCT VFR BLD CALC: 43.5 %
HDLC SERPL-MCNC: 55 MG/DL
HGB BLD-MCNC: 14.9 G/DL
LDLC SERPL CALC-MCNC: 36 MG/DL
MAGNESIUM SERPL-MCNC: 2 MG/DL
MCHC RBC-ENTMCNC: 29.6 PG
MCHC RBC-ENTMCNC: 34.3 G/DL
MCV RBC AUTO: 86.5 FL
NONHDLC SERPL-MCNC: 57 MG/DL
PHOSPHATE SERPL-MCNC: 3.5 MG/DL
PLATELET # BLD AUTO: 180 K/UL
POTASSIUM SERPL-SCNC: 4.6 MMOL/L
PROT SERPL-MCNC: 6.9 G/DL
RBC # BLD: 5.03 M/UL
RBC # FLD: 12.4 %
SODIUM SERPL-SCNC: 133 MMOL/L
TRIGL SERPL-MCNC: 123 MG/DL
TSH SERPL-ACNC: 3.36 UIU/ML
VIT B12 SERPL-MCNC: 355 PG/ML
WBC # FLD AUTO: 5.24 K/UL

## 2025-01-17 LAB — CA-I SERPL-SCNC: 5.2 MG/DL

## 2025-02-12 ENCOUNTER — APPOINTMENT (OUTPATIENT)
Dept: CARDIOLOGY | Facility: CLINIC | Age: 70
End: 2025-02-12

## 2025-02-12 ENCOUNTER — NON-APPOINTMENT (OUTPATIENT)
Age: 70
End: 2025-02-12

## 2025-02-12 VITALS
HEART RATE: 71 BPM | DIASTOLIC BLOOD PRESSURE: 80 MMHG | WEIGHT: 230 LBS | SYSTOLIC BLOOD PRESSURE: 136 MMHG | BODY MASS INDEX: 36.1 KG/M2 | RESPIRATION RATE: 16 BRPM | OXYGEN SATURATION: 99 % | HEIGHT: 67 IN

## 2025-02-12 PROCEDURE — G2211 COMPLEX E/M VISIT ADD ON: CPT

## 2025-02-12 PROCEDURE — 99214 OFFICE O/P EST MOD 30 MIN: CPT

## 2025-02-20 PROBLEM — I49.1 APC (ATRIAL PREMATURE CONTRACTIONS): Status: ACTIVE | Noted: 2025-02-20

## 2025-02-21 ENCOUNTER — APPOINTMENT (OUTPATIENT)
Dept: CARDIOLOGY | Facility: CLINIC | Age: 70
End: 2025-02-21

## 2025-02-21 PROCEDURE — 93246 EXT ECG>7D<15D RECORDING: CPT

## 2025-03-06 ENCOUNTER — APPOINTMENT (OUTPATIENT)
Dept: INTERNAL MEDICINE | Facility: CLINIC | Age: 70
End: 2025-03-06
Payer: MEDICARE

## 2025-03-06 VITALS
BODY MASS INDEX: 36.1 KG/M2 | HEIGHT: 67 IN | OXYGEN SATURATION: 99 % | TEMPERATURE: 96.5 F | HEART RATE: 67 BPM | WEIGHT: 230 LBS | SYSTOLIC BLOOD PRESSURE: 159 MMHG | DIASTOLIC BLOOD PRESSURE: 84 MMHG

## 2025-03-06 DIAGNOSIS — I10 ESSENTIAL (PRIMARY) HYPERTENSION: ICD-10-CM

## 2025-03-06 DIAGNOSIS — E11.9 TYPE 2 DIABETES MELLITUS W/OUT COMPLICATIONS: ICD-10-CM

## 2025-03-06 PROCEDURE — 99214 OFFICE O/P EST MOD 30 MIN: CPT | Mod: 25

## 2025-03-06 PROCEDURE — 36415 COLL VENOUS BLD VENIPUNCTURE: CPT

## 2025-03-10 LAB
ESTIMATED AVERAGE GLUCOSE: 171 MG/DL
HBA1C MFR BLD HPLC: 7.6 %

## 2025-03-27 ENCOUNTER — NON-APPOINTMENT (OUTPATIENT)
Age: 70
End: 2025-03-27

## 2025-04-01 ENCOUNTER — NON-APPOINTMENT (OUTPATIENT)
Age: 70
End: 2025-04-01

## 2025-04-01 ENCOUNTER — APPOINTMENT (OUTPATIENT)
Dept: CARDIOLOGY | Facility: CLINIC | Age: 70
End: 2025-04-01
Payer: MEDICARE

## 2025-04-01 VITALS
DIASTOLIC BLOOD PRESSURE: 80 MMHG | SYSTOLIC BLOOD PRESSURE: 168 MMHG | OXYGEN SATURATION: 99 % | HEIGHT: 67 IN | BODY MASS INDEX: 36.41 KG/M2 | RESPIRATION RATE: 16 BRPM | WEIGHT: 232 LBS | HEART RATE: 64 BPM

## 2025-04-01 DIAGNOSIS — I10 ESSENTIAL (PRIMARY) HYPERTENSION: ICD-10-CM

## 2025-04-01 DIAGNOSIS — R07.9 CHEST PAIN, UNSPECIFIED: ICD-10-CM

## 2025-04-01 PROCEDURE — 93000 ELECTROCARDIOGRAM COMPLETE: CPT

## 2025-04-01 PROCEDURE — G2211 COMPLEX E/M VISIT ADD ON: CPT

## 2025-04-01 PROCEDURE — 99214 OFFICE O/P EST MOD 30 MIN: CPT

## 2025-04-01 RX ORDER — METOPROLOL SUCCINATE 25 MG/1
25 TABLET, EXTENDED RELEASE ORAL DAILY
Qty: 90 | Refills: 3 | Status: ACTIVE | COMMUNITY
Start: 2025-04-01 | End: 1900-01-01

## 2025-04-04 ENCOUNTER — APPOINTMENT (OUTPATIENT)
Dept: ELECTROPHYSIOLOGY | Facility: CLINIC | Age: 70
End: 2025-04-04
Payer: MEDICARE

## 2025-04-04 ENCOUNTER — NON-APPOINTMENT (OUTPATIENT)
Age: 70
End: 2025-04-04

## 2025-04-04 VITALS
DIASTOLIC BLOOD PRESSURE: 89 MMHG | OXYGEN SATURATION: 100 % | BODY MASS INDEX: 36.34 KG/M2 | WEIGHT: 232 LBS | HEART RATE: 68 BPM | SYSTOLIC BLOOD PRESSURE: 156 MMHG

## 2025-04-04 DIAGNOSIS — I49.1 ATRIAL PREMATURE DEPOLARIZATION: ICD-10-CM

## 2025-04-04 PROCEDURE — 99204 OFFICE O/P NEW MOD 45 MIN: CPT

## 2025-04-04 PROCEDURE — 93000 ELECTROCARDIOGRAM COMPLETE: CPT

## 2025-04-28 ENCOUNTER — RX RENEWAL (OUTPATIENT)
Age: 70
End: 2025-04-28

## 2025-04-30 ENCOUNTER — NON-APPOINTMENT (OUTPATIENT)
Age: 70
End: 2025-04-30

## 2025-05-01 ENCOUNTER — APPOINTMENT (OUTPATIENT)
Dept: CARDIOLOGY | Facility: CLINIC | Age: 70
End: 2025-05-01
Payer: MEDICARE

## 2025-05-01 DIAGNOSIS — R07.9 CHEST PAIN, UNSPECIFIED: ICD-10-CM

## 2025-05-01 PROCEDURE — 93015 CV STRESS TEST SUPVJ I&R: CPT

## 2025-05-01 PROCEDURE — A9500: CPT

## 2025-05-01 PROCEDURE — 78452 HT MUSCLE IMAGE SPECT MULT: CPT

## 2025-05-02 ENCOUNTER — NON-APPOINTMENT (OUTPATIENT)
Age: 70
End: 2025-05-02

## 2025-05-06 ENCOUNTER — RX RENEWAL (OUTPATIENT)
Age: 70
End: 2025-05-06

## 2025-05-12 ENCOUNTER — NON-APPOINTMENT (OUTPATIENT)
Age: 70
End: 2025-05-12

## 2025-05-12 ENCOUNTER — APPOINTMENT (OUTPATIENT)
Dept: CARDIOLOGY | Facility: CLINIC | Age: 70
End: 2025-05-12
Payer: MEDICARE

## 2025-05-12 ENCOUNTER — RX RENEWAL (OUTPATIENT)
Age: 70
End: 2025-05-12

## 2025-05-12 VITALS
DIASTOLIC BLOOD PRESSURE: 70 MMHG | SYSTOLIC BLOOD PRESSURE: 138 MMHG | WEIGHT: 233 LBS | RESPIRATION RATE: 16 BRPM | HEIGHT: 67 IN | HEART RATE: 67 BPM | OXYGEN SATURATION: 97 % | BODY MASS INDEX: 36.57 KG/M2

## 2025-05-12 DIAGNOSIS — R94.39 ABNORMAL RESULT OF OTHER CARDIOVASCULAR FUNCTION STUDY: ICD-10-CM

## 2025-05-12 DIAGNOSIS — I10 ESSENTIAL (PRIMARY) HYPERTENSION: ICD-10-CM

## 2025-05-12 DIAGNOSIS — I49.8 OTHER SPECIFIED CARDIAC ARRHYTHMIAS: ICD-10-CM

## 2025-05-12 PROCEDURE — G2211 COMPLEX E/M VISIT ADD ON: CPT

## 2025-05-12 PROCEDURE — 99214 OFFICE O/P EST MOD 30 MIN: CPT

## 2025-05-12 PROCEDURE — 93000 ELECTROCARDIOGRAM COMPLETE: CPT

## 2025-05-22 ENCOUNTER — INPATIENT (INPATIENT)
Facility: HOSPITAL | Age: 70
LOS: 0 days | Discharge: HOME | DRG: 313 | End: 2025-05-23
Attending: INTERNAL MEDICINE | Admitting: INTERNAL MEDICINE
Payer: MEDICARE

## 2025-05-22 ENCOUNTER — TRANSCRIPTION ENCOUNTER (OUTPATIENT)
Age: 70
End: 2025-05-22

## 2025-05-22 VITALS
TEMPERATURE: 98 F | SYSTOLIC BLOOD PRESSURE: 166 MMHG | HEIGHT: 67 IN | RESPIRATION RATE: 16 BRPM | WEIGHT: 229.94 LBS | HEART RATE: 75 BPM | DIASTOLIC BLOOD PRESSURE: 77 MMHG | OXYGEN SATURATION: 99 %

## 2025-05-22 DIAGNOSIS — R07.89 OTHER CHEST PAIN: ICD-10-CM

## 2025-05-22 LAB
ANION GAP SERPL CALC-SCNC: 16 MMOL/L — SIGNIFICANT CHANGE UP (ref 5–17)
BUN SERPL-MCNC: 13 MG/DL — SIGNIFICANT CHANGE UP (ref 7–23)
CALCIUM SERPL-MCNC: 10 MG/DL — SIGNIFICANT CHANGE UP (ref 8.4–10.5)
CHLORIDE SERPL-SCNC: 95 MMOL/L — LOW (ref 96–108)
CO2 SERPL-SCNC: 23 MMOL/L — SIGNIFICANT CHANGE UP (ref 22–31)
CREAT SERPL-MCNC: 0.97 MG/DL — SIGNIFICANT CHANGE UP (ref 0.5–1.3)
EGFR: 84 ML/MIN/1.73M2 — SIGNIFICANT CHANGE UP
EGFR: 84 ML/MIN/1.73M2 — SIGNIFICANT CHANGE UP
GLUCOSE BLDC GLUCOMTR-MCNC: 226 MG/DL — HIGH (ref 70–99)
GLUCOSE BLDC GLUCOMTR-MCNC: 234 MG/DL — HIGH (ref 70–99)
GLUCOSE SERPL-MCNC: 206 MG/DL — HIGH (ref 70–99)
HCT VFR BLD CALC: 43.9 % — SIGNIFICANT CHANGE UP (ref 39–50)
HGB BLD-MCNC: 15.6 G/DL — SIGNIFICANT CHANGE UP (ref 13–17)
MCHC RBC-ENTMCNC: 29.9 PG — SIGNIFICANT CHANGE UP (ref 27–34)
MCHC RBC-ENTMCNC: 35.5 G/DL — SIGNIFICANT CHANGE UP (ref 32–36)
MCV RBC AUTO: 84.3 FL — SIGNIFICANT CHANGE UP (ref 80–100)
NRBC BLD AUTO-RTO: 0 /100 WBCS — SIGNIFICANT CHANGE UP (ref 0–0)
PLATELET # BLD AUTO: 181 K/UL — SIGNIFICANT CHANGE UP (ref 150–400)
POTASSIUM SERPL-MCNC: 4.2 MMOL/L — SIGNIFICANT CHANGE UP (ref 3.5–5.3)
POTASSIUM SERPL-SCNC: 4.2 MMOL/L — SIGNIFICANT CHANGE UP (ref 3.5–5.3)
RBC # BLD: 5.21 M/UL — SIGNIFICANT CHANGE UP (ref 4.2–5.8)
RBC # FLD: 12.4 % — SIGNIFICANT CHANGE UP (ref 10.3–14.5)
SODIUM SERPL-SCNC: 134 MMOL/L — LOW (ref 135–145)
WBC # BLD: 6.45 K/UL — SIGNIFICANT CHANGE UP (ref 3.8–10.5)
WBC # FLD AUTO: 6.45 K/UL — SIGNIFICANT CHANGE UP (ref 3.8–10.5)

## 2025-05-22 PROCEDURE — 92928 PRQ TCAT PLMT NTRAC ST 1 LES: CPT | Mod: RC

## 2025-05-22 PROCEDURE — 93010 ELECTROCARDIOGRAM REPORT: CPT | Mod: 76

## 2025-05-22 PROCEDURE — 99152 MOD SED SAME PHYS/QHP 5/>YRS: CPT

## 2025-05-22 PROCEDURE — 93458 L HRT ARTERY/VENTRICLE ANGIO: CPT | Mod: 26,59

## 2025-05-22 RX ORDER — AMLODIPINE BESYLATE 10 MG/1
1 TABLET ORAL
Refills: 0 | DISCHARGE

## 2025-05-22 RX ORDER — ROSUVASTATIN CALCIUM 20 MG/1
20 TABLET, FILM COATED ORAL AT BEDTIME
Refills: 0 | Status: DISCONTINUED | OUTPATIENT
Start: 2025-05-22 | End: 2025-05-23

## 2025-05-22 RX ORDER — INSULIN LISPRO 100 U/ML
INJECTION, SOLUTION INTRAVENOUS; SUBCUTANEOUS
Refills: 0 | Status: DISCONTINUED | OUTPATIENT
Start: 2025-05-22 | End: 2025-05-23

## 2025-05-22 RX ORDER — CLOPIDOGREL BISULFATE 75 MG/1
75 TABLET, FILM COATED ORAL DAILY
Refills: 0 | Status: DISCONTINUED | OUTPATIENT
Start: 2025-05-23 | End: 2025-05-23

## 2025-05-22 RX ORDER — AMLODIPINE BESYLATE 10 MG/1
10 TABLET ORAL DAILY
Refills: 0 | Status: DISCONTINUED | OUTPATIENT
Start: 2025-05-22 | End: 2025-05-23

## 2025-05-22 RX ORDER — LOSARTAN POTASSIUM 100 MG/1
100 TABLET, FILM COATED ORAL DAILY
Refills: 0 | Status: DISCONTINUED | OUTPATIENT
Start: 2025-05-22 | End: 2025-05-23

## 2025-05-22 RX ORDER — ASPIRIN 325 MG
81 TABLET ORAL DAILY
Refills: 0 | Status: DISCONTINUED | OUTPATIENT
Start: 2025-05-23 | End: 2025-05-23

## 2025-05-22 RX ORDER — METOPROLOL SUCCINATE 50 MG/1
25 TABLET, EXTENDED RELEASE ORAL DAILY
Refills: 0 | Status: DISCONTINUED | OUTPATIENT
Start: 2025-05-22 | End: 2025-05-23

## 2025-05-22 RX ORDER — METOPROLOL SUCCINATE 50 MG/1
1 TABLET, EXTENDED RELEASE ORAL
Refills: 0 | DISCHARGE

## 2025-05-22 RX ORDER — INSULIN LISPRO 100 U/ML
INJECTION, SOLUTION INTRAVENOUS; SUBCUTANEOUS AT BEDTIME
Refills: 0 | Status: DISCONTINUED | OUTPATIENT
Start: 2025-05-22 | End: 2025-05-23

## 2025-05-22 RX ADMIN — Medication 75 MILLILITER(S): at 17:52

## 2025-05-22 RX ADMIN — INSULIN LISPRO 2: 100 INJECTION, SOLUTION INTRAVENOUS; SUBCUTANEOUS at 18:10

## 2025-05-22 RX ADMIN — ROSUVASTATIN CALCIUM 20 MILLIGRAM(S): 20 TABLET, FILM COATED ORAL at 22:04

## 2025-05-22 NOTE — DISCHARGE NOTE PROVIDER - NSDCFUSCHEDAPPT_GEN_ALL_CORE_FT
PT IS BEING SEEN IN [facility]  THANK YOU! [facility]  [facility] [id number]  Scheduled Appointment: 06/05/2025

## 2025-05-22 NOTE — ASU PATIENT PROFILE, ADULT - FALL HARM RISK - UNIVERSAL INTERVENTIONS
Bed in lowest position, wheels locked, appropriate side rails in place/Call bell, personal items and telephone in reach/Instruct patient to call for assistance before getting out of bed or chair/Non-slip footwear when patient is out of bed/[place] to call system/Physically safe environment - no spills, clutter or unnecessary equipment/Purposeful Proactive Rounding/Room/bathroom lighting operational, light cord in reach

## 2025-05-22 NOTE — PATIENT PROFILE ADULT - DO YOU FEEL LIKE HURTING YOURSELF OR OTHERS?
will be waiting for her.  I reviewed the H&P, I examined the patient, and there are no changes in the patient's condition.     Blood pressure 124/73, pulse 77, temperature 98 °F (36.7 °C), temperature source Temporal, resp. rate 16, height 5' 6\" (1.676 m), weight 74.8 kg (164 lb 12.8 oz), SpO2 97%.  Right breast incision without signs of infection.    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  For right anterior margin revision   no

## 2025-05-22 NOTE — DISCHARGE NOTE PROVIDER - NSDCCPTREATMENT_GEN_ALL_CORE_FT
PRINCIPAL PROCEDURE  Procedure: Placement of coronary artery stent  Findings and Treatment: 5/22/23 - PCI/PEDRO LUIS x 1 to the RCA via right radial artery  5/23/23 - PCI/PEDRO LUIS x 1 to the LAD via right radial artery

## 2025-05-22 NOTE — ASU PREOP CHECKLIST - SPO2 (%)
[FreeTextEntry1] : LEAH CHRISTIANSON is a [phone]

## 2025-05-22 NOTE — DISCHARGE NOTE PROVIDER - CARE PROVIDER_API CALL
Shwetha Parker  Interventional Cardiology  [address] [place] [address]  Phone: [phone]  Fax: [phone]  Established Patient  Follow Up Time: 2 weeks

## 2025-05-22 NOTE — ASU PATIENT PROFILE, ADULT - TEACHING/LEARNING RELIGIOUS CONSIDERATIONS
"-- Message is from the [facility]--    Reason for Call:  Karla Russell is requesting medical clearance for this patient. Can you please fax that over to  Information       Type Contact Phone    05/17/2019 09:05 AM Phone (Incoming) Netta Boeck  [phone]     [facility] orthopedics           Alternative phone number: None      Turnaround time given to caller: ""This message will be sent to [facility] Provider's name]. The clinical team will fulfill your request as soon as they review your message. \""    " none

## 2025-05-22 NOTE — DISCHARGE NOTE PROVIDER - HOSPITAL COURSE
Cardiac Rehab (STEMI/NSTEMI/ACS/Unstable Angina/CHF/Chronic Stable Angina/Heart Surgery (CABG,Valve)/Post PCI):              *Education on benefits of Cardiac Rehab provided to patient: Yes         *Referral and Prescription Given for Cardiac Rehab : Yes         *Pt given list of locations & instructed to contact their insurance company to review list of participating providers: Yes         *Pt instructed to bring Cardiac Rehab prescription with them to Cardiology Follow up appointment for assistance with enrollment: Yes         *Pt discharged with copies detail cardiovascular history, medications, testing/treatments: Yes       68 y/o male with pmh of CVA on ASA with right sided residual weakness, HTN, DMT2 compliant with meds uncomplicated, Hepatitis A, chronic gerd followed by Dr Parker, Cardiologist with complaints of chest pain radiating to bilateral arms and had abnormal pharmacologic stress test on 5/1/25 with LVEF 70% having class II anginal symptoms underwent a LHC on 5/22/25.   Catheterization revealed RCA s/p PEDRO LUIS x 1 via RRA (view catheterization report for full details). Post procedure, right radial band was removed according to protocol without complication. Patient remained hemodynamically stable, neurovascularly intact and chest pain free. Patient voided and ambulated and had no EKG changes post procedure. He remained overnight for observation and  for staged PCI.  He had staged PCI/PEDRO LUIS LAD on 5/23/2025 via RRA with no post procedure complications.  His radial band was removed as per protocol with no hematoma/bleeding or neurovascular compromise.  Vital signs stable with no EKG changes or reports of chest pain or SOB.  The remainder of his hospitalization was uneventful.  He was provided education regarding DAPT/statin therapy, as well as post procedure wound care instructions.  He will follow up with his private cardiologist Dr. Parker in 2 weeks and advised to return to ER with any concerning symptoms.            Cardiac Rehab (STEMI/NSTEMI/ACS/Unstable Angina/CHF/Chronic Stable Angina/Heart Surgery (CABG,Valve)/Post PCI):              *Education on benefits of Cardiac Rehab provided to patient: Yes         *Referral and Prescription Given for Cardiac Rehab : Yes         *Pt given list of locations & instructed to contact their insurance company to review list of participating providers: Yes         *Pt instructed to bring Cardiac Rehab prescription with them to Cardiology Follow up appointment for assistance with enrollment: Yes         *Pt discharged with copies detail cardiovascular history, medications, testing/treatments: Yes

## 2025-05-22 NOTE — DISCHARGE NOTE PROVIDER - NSDCMRMEDTOKEN_GEN_ALL_CORE_FT
amLODIPine 10 mg oral tablet: 1 tab(s) orally once a day  aspirin 325 mg oral tablet: 1 tab(s) orally once a day  losartan 100 mg oral tablet: 1 tab(s) orally once a day  metFORMIN 500 mg oral tablet, extended release: 1 tab(s) orally once a day  metoprolol succinate 25 mg oral tablet, extended release: 1 tab(s) orally once a day  nebivolol 5 mg oral tablet: 1 tab(s) orally once a day  Omeprazole: 20 milligram(s) orally once a day  rosuvastatin 20 mg oral capsule: 1 cap(s) orally once a day (at bedtime)  Tradjenta 5 mg oral tablet: 1 tab(s) orally once a day   amLODIPine 10 mg oral tablet: 1 tab(s) orally once a day  aspirin 81 mg oral delayed release tablet: 1 tab(s) orally once a day  Cardiac Rehab to be discussed with your cardiologist upon followup if this would be beneficial for you: 2-3 times a week for 12 weeks  clopidogrel 75 mg oral tablet: 1 tab(s) orally once a day  losartan 100 mg oral tablet: 1 tab(s) orally once a day  metFORMIN 500 mg oral tablet, extended release: 1 tab(s) orally once a day Resume on 5/26/2025  metoprolol succinate 25 mg oral tablet, extended release: 1 tab(s) orally once a day  Omeprazole: 20 milligram(s) orally once a day  rosuvastatin 20 mg oral capsule: 1 cap(s) orally once a day (at bedtime)  Tradjenta 5 mg oral tablet: 1 tab(s) orally once a day

## 2025-05-22 NOTE — H&P CARDIOLOGY - HISTORY OF PRESENT ILLNESS
This is a 68yo  M with no known implantable devices noted with  PMHX of CVA on ASA with right sided residual weakness, HTN, DM2 compliant with meds uncomplicated ,  Pt seen by Dr Allen with complaints of  chest pain radiating to bilateral arms and had abnormal stress test . Now referred for LHC today with Dr. Marcum . Currently CP free no sob no palpitations noted.   < from: TTE Echo Doppler w/o Cont (06.06.19 @ 07:23) >  Summary:   1. Left ventricular ejection fraction, by visual estimation, is 60 to   65%.   2. Technically fair study.   3. Normal global left ventricular systolic function.   4. Normal left ventricular internal cavity size.   5. Spectral Doppler shows impaired relaxation pattern of left   ventricular myocardial filling (Grade I diastolic dysfunction).   6. Normal right ventricular size and function.   7. There is no evidence of pericardial effusion.   8. Structurally normal mitral valve, with normal leaflet excursion.   9. Trace mitral valve regurgitation.    Shravan Haines MD FACC, FASE, FACP  Electronically signed on 6/7/2019 at 12:28:38 PM       < end of copied text >     This is a 70yo  M with no known implantable devices noted with  PMHX of CVA on ASA with right sided residual weakness, HTN, DM2 compliant with meds uncomplicated  Hepatitis A and Chronic Gerd,  Pt seen by Dr Allen with complaints of  chest pain radiating to bilateral arms and had abnormal pharmacologic stress test on 5/1/25 revealed small sized very mild defect in the apical septal wall that is fixed suggestive of an infarct the LV is normal size EF 70%  .   Now referred for C today with Dr. Marcum . Currently CP free no sob no palpitations noted.   < from: TTE Echo Doppler w/o Cont (06.06.19 @ 07:23) >  Summary:   1. Left ventricular ejection fraction, by visual estimation, is 60 to   65%.   2. Technically fair study.   3. Normal global left ventricular systolic function.   4. Normal left ventricular internal cavity size.   5. Spectral Doppler shows impaired relaxation pattern of left   ventricular myocardial filling (Grade I diastolic dysfunction).   6. Normal right ventricular size and function.   7. There is no evidence of pericardial effusion.   8. Structurally normal mitral valve, with normal leaflet excursion.   9. Trace mitral valve regurgitation.    Shravan Haines MD FACC, FASE, FACP  Electronically signed on 6/7/2019 at 12:28:38 PM       < end of copied text >

## 2025-05-22 NOTE — ASU PREOP CHECKLIST - TEMPERATURE IN CELSIUS (DEGREES C)
Assessment and Plan:  Problem List Items Addressed This Visit          Health Encounters    Encounter to obtain excuse from work - Primary     CC of needing FMLA forms filled out to assist her son to get onto the school bus in the morning. States it causes her to be late 20 minutes to work. States she has no one else to get her son to the school bus and she is unable to drive him to school as it is in the opposite direction. Patient has no other concerns or complaints.          Other Visit Diagnoses       Screen for colon cancer        Relevant Orders    Occult Blood - iFOB (aka FIT)               SUBJECTIVE:   Chiqui Reyes is a 46 year old female here for   Chief Complaint   Patient presents with    Other     FMLA forms       Patient presents to the office with a CC of needing FMLA forms filled out to assist her son to get onto the school bus in the morning. States it causes her to be late 20 minutes to work. States she has no one else to get her son to the school bus and she is unable to drive him to school as it is in the opposite direction. Patient has no other concerns or complaints. Reviewed previous labs          Current Outpatient Medications   Medication Sig Dispense Refill    Multiple Vitamins-Minerals (MULTI COMPLETE PO)  (Patient not taking: Reported on 2024)       No current facility-administered medications for this visit.       FAMILY HISTORY:  Family History   Family history unknown: Yes     SOCIAL HISTORY:  Social History     Tobacco Use    Smoking status: Never    Smokeless tobacco: Never   Vaping Use    Vaping status: never used   Substance Use Topics    Alcohol use: Yes     Comment: occasional     Drug use: Never     Past Surgical HISTORY  Past Surgical History:   Procedure Laterality Date     section, low transverse      Colposcopy,loop electrd cervix excis      Oral surgery procedure           Review of Systems:  Negative unless listed above or in HPI    OBJECTIVE:   Visit  Vitals  /71 (BP Location: LUE - Left upper extremity, Patient Position: Sitting, Cuff Size: Regular)   Pulse 73   Temp 98.5 °F (36.9 °C) (Temporal)   Wt 81.4 kg (179 lb 7.3 oz)   LMP 10/25/2024 (Exact Date)   SpO2 98%   BMI 28.11 kg/m²     Physical Exam  Vitals and nursing note reviewed.   Constitutional:       Appearance: Normal appearance.   Cardiovascular:      Rate and Rhythm: Normal rate and regular rhythm.      Pulses: Normal pulses.      Heart sounds: Normal heart sounds.   Pulmonary:      Effort: Pulmonary effort is normal.      Breath sounds: Normal breath sounds.   Musculoskeletal:         General: Normal range of motion.   Skin:     General: Skin is warm and dry.   Neurological:      General: No focal deficit present.      Mental Status: She is alert and oriented to person, place, and time. Mental status is at baseline.   Psychiatric:         Mood and Affect: Mood normal.         Behavior: Behavior normal.         Thought Content: Thought content normal.         Judgment: Judgment normal.             LABS:  Lab Results   Component Value Date    BUN 10 11/22/2023    GLUCOSE 95 11/22/2023    CREATININE 0.81 11/22/2023    ALBUMIN 3.6 11/22/2023    AST 12 11/22/2023    GPT 16 11/22/2023    HGB 12.7 11/22/2023    HCT 37.8 11/22/2023    WBC 5.0 11/22/2023        HDL (mg/dL)   Date Value   11/22/2023 59     LDL (mg/dL)   Date Value   11/22/2023 75     Triglycerides (mg/dL)   Date Value   11/22/2023 51     Cholesterol (mg/dL)   Date Value   11/22/2023 144       Hemoglobin A1C (%)   Date Value   11/22/2023 5.1          Please see patient instructions for any further recommendations.    Return in about 1 month (around 12/6/2024) for physical .       Refer to orders.  Medical compliance with plan discussed and risks of non-compliance reviewed.  Patient education completed on disease process, etiology & prognosis.  Proper usage and side effects of medications reviewed & discussed.  Return to clinic as  clinically indicated as discussed with the patient who verbalized understanding of the plan and is in agreement with the plan.   36.6

## 2025-05-22 NOTE — DISCHARGE NOTE PROVIDER - NSDCCPCAREPLAN_GEN_ALL_CORE_FT
PRINCIPAL DISCHARGE DIAGNOSIS  Diagnosis: CAD (coronary artery disease)  Assessment and Plan of Treatment:       SECONDARY DISCHARGE DIAGNOSES  Diagnosis: Encounter for cardiac rehabilitation  Assessment and Plan of Treatment: We have provided you with a prescription for cardiac rehab which is medically supervised exercise program for your heart and has been shown to improve the quantity and quality of life of people with heart disease like yours. You should attend cardiac rehab 3 times per week for 12 weeks. We have provided you with a list of nearby facilities. Please call your insurance carrier to determine which of these facilities are covered under your plan. Please bring this prescription with you to your follow up appointment with your cardiologist who can then further assist you to enroll into a cardiac rehab program.     PRINCIPAL DISCHARGE DIAGNOSIS  Diagnosis: CAD (coronary artery disease)  Assessment and Plan of Treatment: Low salt, low fat diet.   Weight management.   Take medications as prescribed.    No smoking.  Follow up appointments with your doctor(s)  as instructed.      SECONDARY DISCHARGE DIAGNOSES  Diagnosis: Encounter for cardiac rehabilitation  Assessment and Plan of Treatment: We have provided you with a prescription for cardiac rehab which is medically supervised exercise program for your heart and has been shown to improve the quantity and quality of life of people with heart disease like yours. You should attend cardiac rehab 3 times per week for 12 weeks. We have provided you with a list of nearby facilities. Please call your insurance carrier to determine which of these facilities are covered under your plan. Please bring this prescription with you to your follow up appointment with your cardiologist who can then further assist you to enroll into a cardiac rehab program.    Diagnosis: Benign essential HTN  Assessment and Plan of Treatment: Continue with your blood pressure medications; eat a heart healthy diet with low salt diet; exercise regularly (consult with your physician or cardiologist first); maintain a heart healthy weight; if you smoke - quit (A resource to help you stop smoking is the [facility] [employer] – phone number [phone].); include healthy ways to manage stress. Continue to follow with your primary care physician or cardiologist.    Diagnosis: HLD (hyperlipidemia)  Assessment and Plan of Treatment: Continue with your cholesterol medications. Eat a heart healthy diet that is low in saturated fats and salt, and includes whole grains, fruits, vegetables and lean protein; exercise regularly (consult with your physician or cardiologist first); maintain a heart healthy weight; if you smoke - quit (A resource to help you stop smoking is the [facility] [employer] – phone number [phone].). Continue to follow with your primary physician or cardiologist.    Diagnosis: CVA (cerebrovascular accident)  Assessment and Plan of Treatment: Continue your aspirin we lowered it from 325mg daily to 81mg daily while on plavix for your stent.  You will be instructed by your neurologist and your cardiologist if they want you to go back to full dose aspirin if they stop plavix in the future.    Diagnosis: Diabetes mellitus  Assessment and Plan of Treatment: Continue to follow with your primary care MD or your endocrinologist.  Follow a heart healthy diabetic diet. If you check your fingerstick glucose at home, call your MD if it is greater than 250mg/dL on 2 occasions or less than 100mg/dL on 2 occasions. Know signs of low blood sugar, such as: dizziness, shakiness, sweating, confusion, hunger, nervousness-drink 4 ounces apple juice if occurs and call your doctor. Know early signs of high blood sugar, such as: frequent urination, increased thirst, blurry vision, fatigue, headache - call your doctor if this occurs. Follow with other practitioners to care for your diabetes, such as ophthamologist and podiatrist.

## 2025-05-23 ENCOUNTER — TRANSCRIPTION ENCOUNTER (OUTPATIENT)
Age: 70
End: 2025-05-23

## 2025-05-23 VITALS
HEART RATE: 69 BPM | DIASTOLIC BLOOD PRESSURE: 72 MMHG | OXYGEN SATURATION: 94 % | TEMPERATURE: 99 F | SYSTOLIC BLOOD PRESSURE: 141 MMHG | RESPIRATION RATE: 18 BRPM

## 2025-05-23 LAB
ANION GAP SERPL CALC-SCNC: 13 MMOL/L — SIGNIFICANT CHANGE UP (ref 5–17)
BUN SERPL-MCNC: 12 MG/DL — SIGNIFICANT CHANGE UP (ref 7–23)
CALCIUM SERPL-MCNC: 9 MG/DL — SIGNIFICANT CHANGE UP (ref 8.4–10.5)
CHLORIDE SERPL-SCNC: 100 MMOL/L — SIGNIFICANT CHANGE UP (ref 96–108)
CO2 SERPL-SCNC: 21 MMOL/L — LOW (ref 22–31)
CREAT SERPL-MCNC: 0.94 MG/DL — SIGNIFICANT CHANGE UP (ref 0.5–1.3)
EGFR: 87 ML/MIN/1.73M2 — SIGNIFICANT CHANGE UP
EGFR: 87 ML/MIN/1.73M2 — SIGNIFICANT CHANGE UP
GLUCOSE BLDC GLUCOMTR-MCNC: 156 MG/DL — HIGH (ref 70–99)
GLUCOSE BLDC GLUCOMTR-MCNC: 244 MG/DL — HIGH (ref 70–99)
GLUCOSE SERPL-MCNC: 140 MG/DL — HIGH (ref 70–99)
HCT VFR BLD CALC: 39.6 % — SIGNIFICANT CHANGE UP (ref 39–50)
HGB BLD-MCNC: 14.5 G/DL — SIGNIFICANT CHANGE UP (ref 13–17)
MAGNESIUM SERPL-MCNC: 1.9 MG/DL — SIGNIFICANT CHANGE UP (ref 1.6–2.6)
MCHC RBC-ENTMCNC: 30.2 PG — SIGNIFICANT CHANGE UP (ref 27–34)
MCHC RBC-ENTMCNC: 36.6 G/DL — HIGH (ref 32–36)
MCV RBC AUTO: 82.5 FL — SIGNIFICANT CHANGE UP (ref 80–100)
NRBC BLD AUTO-RTO: 0 /100 WBCS — SIGNIFICANT CHANGE UP (ref 0–0)
PLATELET # BLD AUTO: 160 K/UL — SIGNIFICANT CHANGE UP (ref 150–400)
POTASSIUM SERPL-MCNC: 3.7 MMOL/L — SIGNIFICANT CHANGE UP (ref 3.5–5.3)
POTASSIUM SERPL-SCNC: 3.7 MMOL/L — SIGNIFICANT CHANGE UP (ref 3.5–5.3)
RBC # BLD: 4.8 M/UL — SIGNIFICANT CHANGE UP (ref 4.2–5.8)
RBC # FLD: 12.3 % — SIGNIFICANT CHANGE UP (ref 10.3–14.5)
SODIUM SERPL-SCNC: 134 MMOL/L — LOW (ref 135–145)
WBC # BLD: 4.81 K/UL — SIGNIFICANT CHANGE UP (ref 3.8–10.5)
WBC # FLD AUTO: 4.81 K/UL — SIGNIFICANT CHANGE UP (ref 3.8–10.5)

## 2025-05-23 PROCEDURE — 99152 MOD SED SAME PHYS/QHP 5/>YRS: CPT

## 2025-05-23 PROCEDURE — 99232 SBSQ HOSP IP/OBS MODERATE 35: CPT

## 2025-05-23 PROCEDURE — 92928 PRQ TCAT PLMT NTRAC ST 1 LES: CPT | Mod: LD

## 2025-05-23 PROCEDURE — 93010 ELECTROCARDIOGRAM REPORT: CPT

## 2025-05-23 RX ORDER — CLOPIDOGREL BISULFATE 75 MG/1
1 TABLET, FILM COATED ORAL
Qty: 90 | Refills: 0
Start: 2025-05-23 | End: 2025-08-20

## 2025-05-23 RX ORDER — LINAGLIPTIN 5 MG/1
1 TABLET, FILM COATED ORAL
Qty: 0 | Refills: 0 | DISCHARGE
Start: 2025-05-23

## 2025-05-23 RX ORDER — ASPIRIN 325 MG
1 TABLET ORAL
Qty: 90 | Refills: 0
Start: 2025-05-23 | End: 2025-08-20

## 2025-05-23 RX ORDER — ASPIRIN 325 MG
81 TABLET ORAL DAILY
Refills: 0 | Status: DISCONTINUED | OUTPATIENT
Start: 2025-05-24 | End: 2025-05-23

## 2025-05-23 RX ADMIN — INSULIN LISPRO 1: 100 INJECTION, SOLUTION INTRAVENOUS; SUBCUTANEOUS at 07:30

## 2025-05-23 RX ADMIN — Medication 75 MILLILITER(S): at 07:30

## 2025-05-23 RX ADMIN — Medication 40 MILLIGRAM(S): at 08:22

## 2025-05-23 RX ADMIN — AMLODIPINE BESYLATE 10 MILLIGRAM(S): 10 TABLET ORAL at 05:11

## 2025-05-23 RX ADMIN — CLOPIDOGREL BISULFATE 75 MILLIGRAM(S): 75 TABLET, FILM COATED ORAL at 05:11

## 2025-05-23 RX ADMIN — Medication 75 MILLILITER(S): at 10:35

## 2025-05-23 RX ADMIN — Medication 81 MILLIGRAM(S): at 05:11

## 2025-05-23 RX ADMIN — INSULIN LISPRO 2: 100 INJECTION, SOLUTION INTRAVENOUS; SUBCUTANEOUS at 11:38

## 2025-05-23 RX ADMIN — LOSARTAN POTASSIUM 100 MILLIGRAM(S): 100 TABLET, FILM COATED ORAL at 05:11

## 2025-05-23 RX ADMIN — METOPROLOL SUCCINATE 25 MILLIGRAM(S): 50 TABLET, EXTENDED RELEASE ORAL at 05:11

## 2025-05-23 RX ADMIN — Medication 40 MILLIEQUIVALENT(S): at 05:10

## 2025-05-23 NOTE — DISCHARGE NOTE NURSING/CASE MANAGEMENT/SOCIAL WORK - PATIENT PORTAL LINK FT
You can access the FollowMyHealth Patient Portal offered by [facility] by registering at the following website: [email or web address]. By joining [employer]’s FollowMyHealth portal, you will also be able to view your health information using other applications (apps) compatible with our system.

## 2025-05-23 NOTE — PROGRESS NOTE ADULT - SUBJECTIVE AND OBJECTIVE BOX
[facility] INVASIVE CARDIOLOGY- (Jossue Jones, Celeste, Maykel, Kylee, Roselyn, Zeke, Ricky, Good)   CARDIAC CATH LAB, [facility] TEAM   [phone]    CHIEF COMPLAINT: Patient is a 69y old male who presents with a chief complaint of chest pain    HPI: This is a 68yo  M with no known implantable devices noted with  PMHX of CVA on ASA with right sided residual weakness, HTN, DM2 compliant with meds uncomplicated  Hepatitis A and Chronic Gerd,  Pt seen by Dr Allen with complaints of  chest pain radiating to bilateral arms and had abnormal pharmacologic stress test on 5/1/25 revealed small sized very mild defect in the apical septal wall that is fixed suggestive of an infarct the LV is normal size EF 70%     Subjective/Observations: Patient seen and examined.  Denies chest pain, dyspena, dizziness, palpitations, N&V, HA, upper/lower extremity pain, numbness/tingling    Review of Systems all WNL except below indicated:    Constitutional: [ ] Fever [ ] Chills [ ] Fatigue [ ] Weight change   HEENT: [ ] Blurred vision [ ] Eye Pain [ ] Headache [ ] Runny nose [ ] Sore Throat   Respiratory: [ ] Cough [ ] Wheezing [ ] Shortness of breath  Cardiovascular: [ ] Chest Pain [ ] Palpitations [ ] HINSON [ ] PND [ ] Orthopnea  Gastrointestinal: [ ] Abdominal Pain [ ] Diarrhea [ ] Constipation [ ] Hemorrhoids [ ] Nausea [ ] Vomiting  Genitourinary: [ ] Nocturia [ ] Dysuria [ ] Incontinence  Extremities: [ ] Swelling [ ] Joint Pain  Neurologic: [ ] Focal deficit [ ] Paresthesias [ ] Syncope  Lymphatic: [ ] Swelling [ ] Lymphadenopathy   Skin: [ ] Rash [ ] Ecchymoses [ ] Wounds [ ] Lesions  Psychiatry: [ ] Depression [ ] Suicidal/Homicidal Ideation [ ] Anxiety [ ] Sleep Disturbances  [ ] 10 point review of systems is otherwise negative except as mentioned above            [ ]Unable to obtain    PAST MEDICAL & SURGICAL HISTORY:  HTN (hypertension)    DM (diabetes mellitus)    CVA (cerebrovascular accident)    No significant past surgical history    MEDICATIONS  (STANDING):  amLODIPine   Tablet 10 milliGRAM(s) Oral daily  insulin lispro (ADMELOG) corrective regimen sliding scale   SubCutaneous three times a day before meals  insulin lispro (ADMELOG) corrective regimen sliding scale   SubCutaneous at bedtime  losartan 100 milliGRAM(s) Oral daily  metoprolol succinate ER 25 milliGRAM(s) Oral daily  rosuvastatin 20 milliGRAM(s) Oral at bedtime  sodium chloride 0.9%. 1000 milliLiter(s) (75 mL/Hr) IV Continuous <Continuous>    MEDICATIONS  (PRN):    Allergies    penicillin (Swelling)    Intolerances    Vital Signs Last 24 Hrs  T(C): 36.6 (22 May 2025 13:10), Max: 36.6 (22 May 2025 12:57)  T(F): 97.8 (22 May 2025 13:10), Max: 97.8 (22 May 2025 12:57)  HR: 65 (22 May 2025 20:00) (62 - 95)  BP: 146/64 (22 May 2025 20:00) (122/86 - 171/81)  BP(mean): 106 (22 May 2025 13:10) (106 - 106)  RR: 15 (22 May 2025 20:00) (15 - 16)  SpO2: 96% (22 May 2025 20:00) (94% - 99%)    Parameters below as of 22 May 2025 20:00  Patient On (Oxygen Delivery Method): room air    I&O's Summary    Weight (kg): 105.2 (05-22 @ 13:10)    FOCUSED PHYSICAL EXAM:  Pulmonary: Non-labored, breath sounds are clear bilaterally, No wheezing, rales or rhonchi  Cardiovascular: Regular, S1 and S2, No murmurs, rubs, gallops or clicks  Cath site: Right radial stable w/o bleeding or hematoma, soft, + pulses     LABS: All Labs Reviewed:                        15.6   6.45  )-----------( 181      ( 22 May 2025 13:25 )             43.9     22 May 2025 13:25    134    |  95     |  13     ----------------------------<  206    4.2     |  23     |  0.97     Ca    10.0       22 May 2025 13:25    RESULTS:    ECHO:  < from: TTE Echo Doppler w/o Cont (06.06.19 @ 07:23) >  Summary:   1. Left ventricular ejection fraction, by visual estimation, is 60 to   65%.   2. Technically fair study.   3. Normal global left ventricular systolic function.   4. Normal left ventricular internal cavity size.   5. Spectral Doppler shows impaired relaxation pattern of left   ventricular myocardial filling (Grade I diastolic dysfunction).   6. Normal right ventricular size and function.   7. There is no evidence of pericardial effusion.   8. Structurally normal mitral valve, with normal leaflet excursion.   9. Trace mitral valve regurgitation.

## 2025-05-23 NOTE — PROGRESS NOTE ADULT - ASSESSMENT
HPI: This is a 68yo  M with no known implantable devices noted with  PMHX of CVA on ASA with right sided residual weakness, HTN, DM2 compliant with meds uncomplicated  Hepatitis A and Chronic Gerd,  Pt seen by Dr Allen with complaints of  chest pain radiating to bilateral arms and had abnormal pharmacologic stress test on 5/1/25 revealed small sized very mild defect in the apical septal wall that is fixed suggestive of an infarct the LV is normal size EF 70%. Now referred for C today with Dr. Marcum . Currently CP free no sob no palpitations noted.    # CAD  5/22 s/p PEDRO LUIS x1 to RCA via RRA  Continue Aspirin 81 mg daily, Plavix 75 mg daily  Continue Metoprolol Succinate 25 mg daily  Continue Rosuvastatin 20 mg daily  Monitor telemetry  Keep Mg >2 K >4  Follow up appt in 2 weeks post discharge with outpatient cardiologist  Plan for staged PCI to LAD in AM    # HTN  Normotensive  Continue Losartan 100 mg daily, Amlodipine 10 mg daily, Metoprolol Succinate 25 mg daily  DASH diet    # HLD  Continue Rosuvastatin 20 mg daily  DASH diet    # T2DM  Hold Metformin 48 hrs post procedure  Continue SSI while inpatient  FS TID/HS  Continue consistent carb diet  Will resume home diabetic med regimen upon discharge    Davion De Dios [facility]  Invasive Cardiology  Ext [phone]
[phone]

## 2025-05-23 NOTE — DISCHARGE NOTE NURSING/CASE MANAGEMENT/SOCIAL WORK - FINANCIAL ASSISTANCE
[facility] provides services at a reduced cost to those who are determined to be eligible through [facility]’s financial assistance program. Information regarding [facility]’s financial assistance program can be found by going to [email or web address] or by calling [phone].

## 2025-06-02 PROCEDURE — 93005 ELECTROCARDIOGRAM TRACING: CPT

## 2025-06-02 PROCEDURE — 93458 L HRT ARTERY/VENTRICLE ANGIO: CPT | Mod: 59

## 2025-06-02 PROCEDURE — 83735 ASSAY OF MAGNESIUM: CPT

## 2025-06-02 PROCEDURE — 85027 COMPLETE CBC AUTOMATED: CPT

## 2025-06-02 PROCEDURE — 80048 BASIC METABOLIC PNL TOTAL CA: CPT

## 2025-06-02 PROCEDURE — C1874: CPT

## 2025-06-02 PROCEDURE — C1894: CPT

## 2025-06-02 PROCEDURE — 82962 GLUCOSE BLOOD TEST: CPT

## 2025-06-02 PROCEDURE — C9600: CPT | Mod: RC

## 2025-06-02 PROCEDURE — C1887: CPT

## 2025-06-02 PROCEDURE — C1769: CPT

## 2025-06-05 ENCOUNTER — APPOINTMENT (OUTPATIENT)
Dept: CARDIOLOGY | Facility: CLINIC | Age: 70
End: 2025-06-05
Payer: MEDICARE

## 2025-06-05 ENCOUNTER — NON-APPOINTMENT (OUTPATIENT)
Age: 70
End: 2025-06-05

## 2025-06-05 VITALS
HEIGHT: 67 IN | DIASTOLIC BLOOD PRESSURE: 68 MMHG | RESPIRATION RATE: 16 BRPM | HEART RATE: 78 BPM | OXYGEN SATURATION: 97 % | SYSTOLIC BLOOD PRESSURE: 140 MMHG | BODY MASS INDEX: 35.63 KG/M2 | WEIGHT: 227 LBS

## 2025-06-05 DIAGNOSIS — R94.39 ABNORMAL RESULT OF OTHER CARDIOVASCULAR FUNCTION STUDY: ICD-10-CM

## 2025-06-05 DIAGNOSIS — R07.9 CHEST PAIN, UNSPECIFIED: ICD-10-CM

## 2025-06-05 DIAGNOSIS — I49.1 ATRIAL PREMATURE DEPOLARIZATION: ICD-10-CM

## 2025-06-05 PROBLEM — I25.10 ARTERIOSCLEROSIS OF CORONARY ARTERY: Status: ACTIVE | Noted: 2025-06-05

## 2025-06-05 PROCEDURE — G2211 COMPLEX E/M VISIT ADD ON: CPT

## 2025-06-05 PROCEDURE — 93000 ELECTROCARDIOGRAM COMPLETE: CPT

## 2025-06-05 PROCEDURE — 99214 OFFICE O/P EST MOD 30 MIN: CPT

## 2025-06-09 ENCOUNTER — APPOINTMENT (OUTPATIENT)
Dept: INTERNAL MEDICINE | Facility: CLINIC | Age: 70
End: 2025-06-09
Payer: MEDICARE

## 2025-06-09 ENCOUNTER — LABORATORY RESULT (OUTPATIENT)
Age: 70
End: 2025-06-09

## 2025-06-09 VITALS
OXYGEN SATURATION: 100 % | WEIGHT: 227 LBS | HEIGHT: 67 IN | HEART RATE: 87 BPM | DIASTOLIC BLOOD PRESSURE: 77 MMHG | SYSTOLIC BLOOD PRESSURE: 147 MMHG | BODY MASS INDEX: 35.63 KG/M2 | TEMPERATURE: 97 F

## 2025-06-09 PROCEDURE — 99214 OFFICE O/P EST MOD 30 MIN: CPT

## 2025-06-09 PROCEDURE — G2211 COMPLEX E/M VISIT ADD ON: CPT

## 2025-06-09 PROCEDURE — 36415 COLL VENOUS BLD VENIPUNCTURE: CPT

## 2025-06-11 LAB
ALBUMIN SERPL ELPH-MCNC: 4.2 G/DL
ALP BLD-CCNC: 66 U/L
ALT SERPL-CCNC: 18 U/L
AMYLASE/CREAT SERPL: 48 U/L
ANION GAP SERPL CALC-SCNC: 14 MMOL/L
APPEARANCE: CLEAR
AST SERPL-CCNC: 17 U/L
BASOPHILS # BLD AUTO: 0.05 K/UL
BASOPHILS NFR BLD AUTO: 0.9 %
BILIRUB SERPL-MCNC: 2.5 MG/DL
BILIRUBIN URINE: NEGATIVE
BLOOD URINE: NEGATIVE
BUN SERPL-MCNC: 15 MG/DL
CALCIUM SERPL-MCNC: 9.7 MG/DL
CHLORIDE SERPL-SCNC: 98 MMOL/L
CHOLEST SERPL-MCNC: 89 MG/DL
CO2 SERPL-SCNC: 22 MMOL/L
COLOR: YELLOW
CREAT SERPL-MCNC: 1.06 MG/DL
CREAT SPEC-SCNC: 88 MG/DL
EGFRCR SERPLBLD CKD-EPI 2021: 76 ML/MIN/1.73M2
EOSINOPHIL # BLD AUTO: 0.12 K/UL
EOSINOPHIL NFR BLD AUTO: 2.1 %
ESTIMATED AVERAGE GLUCOSE: 177 MG/DL
FERRITIN SERPL-MCNC: 234 NG/ML
FOLATE SERPL-MCNC: 10.1 NG/ML
GGT SERPL-CCNC: 16 U/L
GLUCOSE QUALITATIVE U: 250 MG/DL
GLUCOSE SERPL-MCNC: 196 MG/DL
HBA1C MFR BLD HPLC: 7.8 %
HCT VFR BLD CALC: 41.7 %
HDLC SERPL-MCNC: 47 MG/DL
HGB BLD-MCNC: 14.6 G/DL
IMM GRANULOCYTES NFR BLD AUTO: 0.2 %
IRON SATN MFR SERPL: 22 %
IRON SERPL-MCNC: 63 UG/DL
KETONES URINE: NEGATIVE MG/DL
LDLC SERPL-MCNC: 27 MG/DL
LEUKOCYTE ESTERASE URINE: NEGATIVE
LPL SERPL-CCNC: 40 U/L
LYMPHOCYTES # BLD AUTO: 1.31 K/UL
LYMPHOCYTES NFR BLD AUTO: 22.6 %
MAGNESIUM SERPL-MCNC: 1.9 MG/DL
MAN DIFF?: NORMAL
MCHC RBC-ENTMCNC: 30.7 PG
MCHC RBC-ENTMCNC: 35 G/DL
MCV RBC AUTO: 87.8 FL
MICROALBUMIN 24H UR DL<=1MG/L-MCNC: 12.3 MG/DL
MICROALBUMIN/CREAT 24H UR-RTO: 139 MG/G
MONOCYTES # BLD AUTO: 0.41 K/UL
MONOCYTES NFR BLD AUTO: 7.1 %
NEUTROPHILS # BLD AUTO: 3.89 K/UL
NEUTROPHILS NFR BLD AUTO: 67.1 %
NITRITE URINE: NEGATIVE
NONHDLC SERPL-MCNC: 43 MG/DL
PH URINE: 6
PLATELET # BLD AUTO: 204 K/UL
POTASSIUM SERPL-SCNC: 5 MMOL/L
PROT SERPL-MCNC: 7.1 G/DL
PROTEIN URINE: 30 MG/DL
RBC # BLD: 4.75 M/UL
RBC # FLD: 12.8 %
SODIUM SERPL-SCNC: 134 MMOL/L
SPECIFIC GRAVITY URINE: 1.01
TIBC SERPL-MCNC: 283 UG/DL
TRIGL SERPL-MCNC: 77 MG/DL
TSH SERPL-ACNC: 3.44 UIU/ML
UIBC SERPL-MCNC: 220 UG/DL
UROBILINOGEN URINE: 1 MG/DL
VIT B12 SERPL-MCNC: 278 PG/ML
WBC # FLD AUTO: 5.79 K/UL

## 2025-08-04 ENCOUNTER — RX RENEWAL (OUTPATIENT)
Age: 70
End: 2025-08-04

## 2025-08-22 RX ORDER — CLOPIDOGREL BISULFATE 75 MG/1
1 TABLET, FILM COATED ORAL
Qty: 90 | Refills: 2
Start: 2025-08-22 | End: 2026-05-18

## 2025-08-22 RX ORDER — ASPIRIN 325 MG
1 TABLET ORAL
Qty: 90 | Refills: 2
Start: 2025-08-22 | End: 2026-05-18

## 2025-09-09 ENCOUNTER — APPOINTMENT (OUTPATIENT)
Dept: CARDIOLOGY | Facility: CLINIC | Age: 70
End: 2025-09-09
Payer: MEDICARE

## 2025-09-09 ENCOUNTER — NON-APPOINTMENT (OUTPATIENT)
Age: 70
End: 2025-09-09

## 2025-09-09 VITALS
RESPIRATION RATE: 16 BRPM | BODY MASS INDEX: 35.63 KG/M2 | WEIGHT: 227 LBS | OXYGEN SATURATION: 98 % | HEIGHT: 67 IN | HEART RATE: 74 BPM | DIASTOLIC BLOOD PRESSURE: 70 MMHG | SYSTOLIC BLOOD PRESSURE: 128 MMHG

## 2025-09-09 DIAGNOSIS — I49.1 ATRIAL PREMATURE DEPOLARIZATION: ICD-10-CM

## 2025-09-09 DIAGNOSIS — I10 ESSENTIAL (PRIMARY) HYPERTENSION: ICD-10-CM

## 2025-09-09 DIAGNOSIS — I25.10 ATHEROSCLEROTIC HEART DISEASE OF NATIVE CORONARY ARTERY W/OUT ANGINA PECTORIS: ICD-10-CM

## 2025-09-09 PROCEDURE — 93000 ELECTROCARDIOGRAM COMPLETE: CPT

## 2025-09-09 PROCEDURE — G2211 COMPLEX E/M VISIT ADD ON: CPT

## 2025-09-09 PROCEDURE — 99214 OFFICE O/P EST MOD 30 MIN: CPT

## 2025-09-12 ENCOUNTER — RX RENEWAL (OUTPATIENT)
Age: 70
End: 2025-09-12

## (undated) DEVICE — ELCTR GROUNDING PAD ADULT COVIDIEN

## (undated) DEVICE — VENODYNE/SCD SLEEVE CALF MEDIUM

## (undated) DEVICE — TROCAR APPLIED MEDICAL KII BALLOON BLUNT TIP 12MM X 100MM

## (undated) DEVICE — SUT VICRYL 0 27" UR-6

## (undated) DEVICE — SYR LUER LOK 50CC

## (undated) DEVICE — GLV 7 PROTEXIS (WHITE)

## (undated) DEVICE — ENDOCATCH 10MM SPECIMEN POUCH

## (undated) DEVICE — DRAPE 3/4 SHEET W REINFORCEMENT 56X77"

## (undated) DEVICE — DISSECTOR KITTNER 5 MM TIP

## (undated) DEVICE — DRSG 2X2

## (undated) DEVICE — TROCAR COVIDIEN VERSAONE BLADELESS FIXATION 5MM STANDARD

## (undated) DEVICE — PACK GENERAL LAPAROSCOPY

## (undated) DEVICE — ENDO DISSECT INST 5MM

## (undated) DEVICE — DRSG ALLEVYN LIFE 3X3"

## (undated) DEVICE — LOK DVC RX AND BIOPSY

## (undated) DEVICE — GOWN IMPERV BREATHABLE XL

## (undated) DEVICE — GLV 7.5 PROTEXIS (WHITE)

## (undated) DEVICE — SYR LUER LOK 20CC

## (undated) DEVICE — DRSG STERISTRIPS 0.5 X 4"

## (undated) DEVICE — WARMING BLANKET UPPER ADULT

## (undated) DEVICE — FRA-ESU BOVIE FORCE TRIAD T7J19717DX: Type: DURABLE MEDICAL EQUIPMENT

## (undated) DEVICE — TROCAR COVIDIEN VERSAONE BLADELESS FIXATION 12MM STANDARD

## (undated) DEVICE — TUBING HYBRID CO2

## (undated) DEVICE — ELCTR REM POLYHESIVE ADULT PT RETURN 15FT

## (undated) DEVICE — KIT ENDO PROCEDURE CUST W/VLV

## (undated) DEVICE — DRAPE TOWEL BLUE 17" X 24"

## (undated) DEVICE — TROCAR COVIDIEN VERSAONE BLADELESS FIXATION 11MM STANDARD

## (undated) DEVICE — D HELP - CLEARVIEW CLEARIFY SYSTEM

## (undated) DEVICE — SOL IRR BAG NS 0.9% 3000ML

## (undated) DEVICE — TUBING STRYKEFLOW II SUCTION / IRRIGATOR

## (undated) DEVICE — DRSG MASTISOL

## (undated) DEVICE — SYR LUER LOK 10CC

## (undated) DEVICE — TUBING STRYKER PNEUMOCLEAR SMOKE HEAT HUMID

## (undated) DEVICE — SUT MONOCRYL 4-0 27" PS-2 UNDYED